# Patient Record
Sex: MALE | Race: WHITE | NOT HISPANIC OR LATINO | Employment: OTHER | URBAN - METROPOLITAN AREA
[De-identification: names, ages, dates, MRNs, and addresses within clinical notes are randomized per-mention and may not be internally consistent; named-entity substitution may affect disease eponyms.]

---

## 2017-03-06 ENCOUNTER — OFFICE VISIT (OUTPATIENT)
Dept: FAMILY MEDICINE | Facility: CLINIC | Age: 72
End: 2017-03-06
Payer: COMMERCIAL

## 2017-03-06 VITALS
HEART RATE: 98 BPM | BODY MASS INDEX: 27.17 KG/M2 | TEMPERATURE: 97.9 F | DIASTOLIC BLOOD PRESSURE: 90 MMHG | HEIGHT: 73 IN | SYSTOLIC BLOOD PRESSURE: 141 MMHG | OXYGEN SATURATION: 100 % | WEIGHT: 205 LBS

## 2017-03-06 DIAGNOSIS — R29.898 KNEE CLICKING: ICD-10-CM

## 2017-03-06 DIAGNOSIS — E87.6 HYPOPOTASSEMIA: ICD-10-CM

## 2017-03-06 DIAGNOSIS — L98.9 FACIAL SKIN LESION: Primary | ICD-10-CM

## 2017-03-06 PROCEDURE — 99214 OFFICE O/P EST MOD 30 MIN: CPT | Performed by: FAMILY MEDICINE

## 2017-03-06 RX ORDER — POTASSIUM CHLORIDE 750 MG/1
10 TABLET, EXTENDED RELEASE ORAL DAILY
Qty: 90 TABLET | Refills: 1 | Status: SHIPPED | OUTPATIENT
Start: 2017-03-06 | End: 2017-05-23 | Stop reason: ALTCHOICE

## 2017-03-06 ASSESSMENT — PAIN SCALES - GENERAL: PAINLEVEL: NO PAIN (0)

## 2017-03-06 NOTE — PROGRESS NOTES
SUBJECTIVE:                                                    Steve Herring is a 71 year old male who presents to clinic today for the following health issues:    Joint Pain     Onset: Oct 2016     Description:   Location: left knee  Character: clicking    Intensity: 0/10    Progression of Symptoms: intermittent    Accompanying Signs & Symptoms:  -Soreness/stiffness after sitting for a period of time     History:   Previous similar pain: no       Precipitating factors:   Trauma or overuse: no     Alleviating factors:  Improved by: nothing       Therapies Tried and outcome: IBU      Concern - Spot over lip     When did it start?: about 2-3 months    Any strain/injury, other illness, or event to trigger this problem?   no    Previous history of similar problem:   no      Location:           Over top lip    Describe it:   Pimple-like, kept growing, comes to a head    Severity: mild      Progression of symptoms from onset until now:  improving      Accompanying Signs & Symptoms:  -would bleed when nicked   What have you noticed that tends to make this worse?     None      What have you noticed that tends to make this better?     None      What have you done (this time) to try to treat this problem yourself?     None      Did it help?     no           Past medical, family, and social histories, medications, and allergies are reviewed and updated in Epic.     ROS:  Constitutional, HEENT, cardiovascular, pulmonary, gi and gu systems are negative, except as otherwise noted.      Any history above obtained by the Medical Assistant was reviewed by Dr. Mango Gilliland MD, and edited when necessary.    This document serves as a record of the services and decisions personally performed and made by Dr. Gilliland. It was created on his behalf by Kelsi Vega, a trained medical scribe. The creation of this document is based on the provider's statements to the medical scribe.  Kelsi Vega March 6, 2017 8:56 AM   OBJECTIVE:     "                                                /90  Pulse 98  Temp 97.9  F (36.6  C) (Oral)  Ht 6' 1\" (1.854 m)  Wt 205 lb (93 kg)  SpO2 100%  BMI 27.05 kg/m2  Body mass index is 27.05 kg/(m^2).  GENERAL: healthy, alert and no distress  EYES: Eyes grossly normal to inspection, PERRL and conjunctivae and sclerae normal  MS: no gross musculoskeletal defects noted, no edema  SKIN: 2-3 mm lesion of inflamed skin above the lip, near the right border of the philtrum.  NEURO: Normal strength and tone, mentation intact and speech normal, cranial nerves 2-12 intact   PSYCH: mentation appears normal, affect normal/bright      ASSESSMENT/PLAN:                                                    (L98.9) Facial skin lesion  (primary encounter diagnosis)  Comment: Hx suggests that this is a small BCC, that has been traumatized frequently by the patient and by shaving.   Plan: DERMATOLOGY REFERRAL            (R29.898) Knee clicking  Comment: Rule out internal derangement such as a torn cartilage.  Plan: MR Knee Left w/o Contrast, MR Knee Left w/o         Contrast            (E87.6) Hypopotassemia  Comment: The patient does not tolerate the large pill size. He would rather increase his dietary intake of potassium.   Plan: potassium chloride (K-TAB,KLOR-CON) 10 MEQ         tablet        Handouts on potassium rich foods provided. Prescription switched to tablet form but he will not take this until his potassium is rechecked.       The information in this document, created by the medical scribe for me, accurately reflects the services I personally performed and the decisions made by me. I have reviewed and approved this document for accuracy prior to leaving the patient care area.  Mango Gilliland MD    "

## 2017-03-06 NOTE — NURSING NOTE
"Chief Complaint   Patient presents with     Other     Bump over lip     Knee Pain     Left       Initial /85 (BP Location: Left arm, Patient Position: Chair, Cuff Size: Adult Regular)  Pulse 98  Temp 97.9  F (36.6  C) (Oral)  Ht 6' 1\" (1.854 m)  Wt 205 lb (93 kg)  SpO2 100%  BMI 27.05 kg/m2 Estimated body mass index is 27.05 kg/(m^2) as calculated from the following:    Height as of this encounter: 6' 1\" (1.854 m).    Weight as of this encounter: 205 lb (93 kg).  Medication Reconciliation: complete   LUCY Ramon MA      "

## 2017-03-06 NOTE — MR AVS SNAPSHOT
After Visit Summary   3/6/2017    Steve Herring    MRN: [id number]           Patient Information     Date Of Birth          1945        Visit Information        Provider Department      3/6/2017 8:40 AM Mango Gilliland MD [facility]        Today's Diagnoses     Facial skin lesion    -  1    Knee clicking        Hypopotassemia          Care Instructions        ================================================================================  Normal Values   Blood pressure  <140/90 for most adults    <130/80 for some chronic diseases (ask your care team about yours)    BMI (body mass index)  18.5-25 kg/m2 (based on height and weight)     Thank you for visiting [facility]    Normal or non-critical lab and imaging results will be communicated to you by MyChart, letter or phone within 7 days.  If you do not hear from us within 10 days, please call the clinic. If you have a critical or abnormal lab result, we will notify you by phone as soon as possible.     If you have any questions regarding your visit please contact:     Team Comfort:   Clinic Hours Telephone Number   Dr. Mango Miranda 7am-5pm  Monday - Friday [phone]  Christian Hampton RN   Pharmacy 8:00am-8pm Monday-Friday    9am-5pm Saturday- [phone]   Urgent Care 11am-9pm Monday-Friday        9am-5pm Saturday- [phone]     After hours, weekend or if you need to make an appointment with your primary provider please call [phone].   After Hours nurse advise: call [facility]: [phone]    Medication Refills:  Call your pharmacy and they will forward the refill to us. Please allow 3 business days for your refills to be completed.        Please call [facility]/Imagin2/[phone] to schedule your left knee MRI.    Please call the [facility]  "[place]  [phone] to schedule your left knee MRI.    Potassium-Rich Foods  The normal adult diet usually contains 2,000 mcg to 4,000 mcg (50 to 100 mEq) of potassium per day. More potassium is needed when there is excess loss of potassium from the body. Diuretic medicines (\"water pills\"), diarrhea, and vomiting can cause this. To increase the amount of potassium in your diet, include these high potassium foods.    [The (*) indicates foods highest in potassium.]  Vegetables  Artichokes - cooked 1/2 cup, 200 mg to 300 mg*  Asparagus - cooked 1/2 cup, 200 mg to 300 mg  Beans, white, red, coombs cooked 1/2 cup, 300 mg to 500 mg*  Beets - cooked 1/2 cup, 200 mg to 300 mg  Broccoli - cooked or raw 1 cup, 200 mg to 500 mg*  [place] sprouts - cooked 1/2 cup, 200 mg to 300 mg  Cabbage - raw 1 cup, 100 mg to 200 mg  Carrots - raw or cooked 1/2 cup, 100 mg to 200 mg  Celery - raw 1 cup, 300 mg to 400 mg   [place] Beans - fresh or frozen 1/2 cup, 300 mg to 500 mg*   Mushrooms - raw or cooked 1/2 cup, 100 mg to 300 mg  Peas - cooked 1/2 cup, 200 mg to 300 mg   Potatoes - baked 1 medium, 500 mg to 900 mg*   Spinach - raw 2 cups, 300 mg to 400 mg *  Spinach - cooked 1 cups, 800 mg to 900 mg*   Squash, winter - fresh, frozen, or cooked 1/2 cup, 200 mg to 400 mg   Tomato - fresh 1 medium, 200 mg to 300 mg   Tomato juice - canned 1/2 cup, 200 mg to 300 mg   Fruits  Apple juice - unsweetened 1 cup, 200 mg to 300 mg   Apricots - canned 1/2 cup, 200 mg to 300 mg   Apricots - dried 4 pieces, 100 mg to 200 mg   Avocado - raw 1/2 cup, 300 mg to 400 mg*  Banana - fresh 1 small, 300 mg to 400 mg*   Blackberries - fresh or frozen 1 cup, 200 mg to 300 mg   Cantaloupe - fresh 1 cup diced, 300 mg to 400 mg*   Grape juice - unsweetened 1 cup, 200 mg to 300 mg   Honeydew melon - fresh 1 cup diced, 300 mg to 400 mg*   Orange juice - unsweetened, fresh or frozen 1/2 cup, 200 mg to 300 mg  Orange - fresh 1 medium, 200 mg to 300 mg    Pineapple juice " - unsweetened 1 cup, 300 mg to 400 mg   Prune juice - unsweetened 1/2 cup, 300 mg to 400 mg*   Prunes - dried 5 pieces, 300 mg to 400 mg*   Strawberries - fresh or frozen 1 cup, 200 mg to 300 mg  Meat  Red Meat - cooked 3 oz, 100 mg to 300 mg   Shrimp - cooked 3/4 cup, 100 mg to 200 mg   Tuna - fresh or canned 3/4 cup, 200 mg to 500 mg   Cod, flounder, halibut - cooked 3 oz, 100 mg to 300 mg*  Partlow - cooked 3 oz, 300 mg to 400 mg*   Scallops - cooked 3 oz, 200 mg to 300 mg*    0414-4625 [employer]. [address] [place] [address], [place] [address]. All rights reserved. This information is not intended as a substitute for professional medical care. Always follow your healthcare professional's instructions.        Discharge Instructions: Eating a High-Potassium Diet  Your healthcare provider has told you to eat a high-potassium diet. This may be because you have low levels of potassium in your blood. Or it may be because you have high blood pressure. Potassium is found in many foods. These include dairy products, nuts, seeds, and beans. It s also found in many fruits and vegetables in high amounts.  Guidelines for a high-potassium diet    Eat fruits and vegetables in their fresh or raw form most often.    Check labels for ingredients that have potassium. This includes potassium chloride. Add these items to your diet.    Try salt substitutes. Many of these have potassium.    Avoid licorice. This includes licorice root and teas that have licorice. These can ronnell your body of potassium.  Eat plenty of the following high-potassium foods:    Fruits. Good choices are apricots (canned and fresh), bananas, cantaloupe, honeydew melon, kiwi, nectarines, oranges, orange juice, and pears. Dried fruits include apricots, dates, figs, and prunes. Prune juice also has potassium.    Vegetables. Good choices are asparagus, avocado, artichoke, broccoli, bamboo shoots, beets, brussels sprouts, cabbage, celery, chard, okra,  potatoes (white and sweet), pumpkin, rutabaga, spinach (cooked), squash, tomatoes. Also good are tomato sauce, tomato juice, and vegetable juice cocktail.    Chicken, fish, clams, and crab    Milk, chocolate milk, buttermilk, and soy milk    Legumes. These include black-eyed peas, chickpeas, lentils, lima beans, navy beans, red kidney beans, soybeans, and split peas.    Nuts and seeds. Try almonds, Brazil nuts, cashews, peanuts, peanut butter, pecans, pumpkin seeds, sunflower seeds, and walnuts.    Breads and cereals. These include bran and whole-grain products.    Others foods include chocolate, cocoa, coconut milk, and molasses  Follow-up  Make a follow-up appointment for a repeat test. Our staff can help you do this.  When to call your healthcare provider  Call your healthcare provider right away or go to the ER if you have any of the following:    Vomiting    Fatigue    Diarrhea    Rapid, irregular heartbeat    Shortness of breath    Chest pain    Muscle cramps, spasms, or twitching    Weakness    Paralysis     [id number] [employer]. [address] [place] [address]. All rights reserved. This information is not intended as a substitute for professional medical care. Always follow your healthcare professional's instructions.              Follow-ups after your visit        Additional Services     DERMATOLOGY REFERRAL       Your provider has referred you to:   FMG: [facility] [phone]   [email or web address]  UMP: [facility] [phone]   [email or web address]  UMP: [facility] [phone]   [email or web address]  FHN:  [facility] [phone]   [email or web address]  FHN: [facility] [place] [phone]  [email or web address]  FH[facility]:  [facility] [phone]   [email or web address]  N: [facility] [place] [phone]   [email or web address]  FHN: [facility] [place] [phone]   [email or web address]  FHN: [facility] [place] [phone]   [email or web address]    [facility] -   Sirisha Granados [phone]   [email or web address]  Lucero Treutlen [phone]   [email or web address]  [place] (2 locations) [phone]   [email or web address]  [place] [phone]   [email or web address]      Please be aware that coverage of these services is subject to the terms and limitations of your health insurance plan.  Call member services at your health plan with any benefit or coverage questions.      Please bring the following with you to your appointment:    (1) Any X-Rays, CTs or MRIs which have been performed.  Contact the facility where they were done to arrange for  prior to your scheduled appointment.    (2) List of current medications  (3) This referral request   (4) Any documents/labs given to you for this referral                  Future tests that were ordered for you today     Open Future Orders        Priority Expected Expires Ordered    MR Knee Left w/o Contrast Routine  3/6/2018 3/6/2017    MR Knee Left w/o Contrast Routine  3/6/2018 3/6/2017            Who to contact     If you have questions or need follow up information about today's clinic visit or your schedule please contact [facility] directly at [phone].  Normal or non-critical lab and imaging results will be communicated to you by MyChart, letter or phone within 4 business days after the clinic has received the results. If you do not hear from us within 7 days, please contact the clinic through MyChart or phone. If you have a critical or abnormal  "lab result, we will notify you by phone as soon as possible.  Submit refill requests through [email or web address] or call your pharmacy and they will forward the refill request to us. Please allow 3 business days for your refill to be completed.          Additional Information About Your Visit        [employer] Information     [email or web address] gives you secure access to your electronic health record. If you see a primary care provider, you can also send messages to your care team and make appointments. If you have questions, please call your primary care clinic.  If you do not have a primary care provider, please call [phone] and they will assist you.        Care EveryWhere ID     This is your Care EveryWhere ID. This could be used by other organizations to access your [place] medical records  [id number]        Your Vitals Were     Pulse Temperature Height Pulse Oximetry BMI (Body Mass Index)       98 97.9  F (36.6  C) (Oral) 6' 1\" (1.854 m) 100% 27.05 kg/m2        Blood Pressure from Last 3 Encounters:   03/06/17 141/90   11/29/16 138/70   11/15/16 154/58    Weight from Last 3 Encounters:   03/06/17 205 lb (93 kg)   11/15/16 208 lb (94.3 kg)   05/03/16 207 lb 12.8 oz (94.3 kg)              We Performed the Following     DERMATOLOGY REFERRAL          Today's Medication Changes          These changes are accurate as of: 3/6/17  9:26 AM.  If you have any questions, ask your nurse or doctor.               Start taking these medicines.        Dose/Directions    potassium chloride 10 MEQ tablet   Commonly known as:  K-TAB,KLOR-CON   Used for:  Hypopotassemia   Replaces:  potassium chloride 10 MEQ CR capsule   Started by:  Mango Gilliland MD        Dose:  10 mEq   Take 1 tablet (10 mEq) by mouth daily   Quantity:  90 tablet   Refills:  1         Stop taking these medicines if you haven't already. Please contact your care team if you have questions.     potassium chloride 10 MEQ CR capsule   Commonly known as:  MICRO-K   Replaced by:  " potassium chloride 10 MEQ tablet   Stopped by:  Mango Gilliland MD                Where to get your medicines      These medications were sent to [facility] [address] [facility] [place] - [address]  [address] [place] [address]     Phone:  [phone]     potassium chloride 10 MEQ tablet                Primary Care Provider Office Phone # Fax #    Mango Gilliland [phone]       [facility] [address]  [facility] [address]        Thank you!     Thank you for choosing [facility]  for your care. Our goal is always to provide you with excellent care. Hearing back from our patients is one way we can continue to improve our services. Please take a few minutes to complete the written survey that you may receive in the mail after your visit with us. Thank you!             Your Updated Medication List - Protect others around you: Learn how to safely use, store and throw away your medicines at www.disposemymeds.org.          This list is accurate as of: 3/6/17  9:26 AM.  Always use your most recent med list.                   Brand Name Dispense Instructions for use    aspirin 81 MG tablet      1 tablet daily. *.       hydrochlorothiazide 25 MG tablet    HYDRODIURIL    90 tablet    Take 1 tablet (25 mg) by mouth daily for blood pressure       ibuprofen 200 MG tablet    ADVIL/MOTRIN     2-3 tabs every 4 hours (ave 3 times per week)       potassium chloride 10 MEQ tablet    K-TAB,KLOR-CON    90 tablet    Take 1 tablet (10 mEq) by mouth daily       sildenafil 20 MG tablet    REVATIO/VIAGRA    50 tablet    Take 1-5 tablets ( mg) by mouth daily as needed , as directed, 1-3 hours before intimacy. Maximum 1 dose per 24 hours. Never use with nitroglycerin, terazosin or doxazosin.       simvastatin 20 MG tablet    ZOCOR    90 tablet    Take 1 tablet (20 mg) by mouth every evening for cholesterol.

## 2017-03-06 NOTE — PATIENT INSTRUCTIONS
"    ================================================================================  Normal Values   Blood pressure  <140/90 for most adults    <130/80 for some chronic diseases (ask your care team about yours)    BMI (body mass index)  18.5-25 kg/m2 (based on height and weight)     Thank you for visiting [facility]    Normal or non-critical lab and imaging results will be communicated to you by MyChart, letter or phone within 7 days.  If you do not hear from us within 10 days, please call the clinic. If you have a critical or abnormal lab result, we will notify you by phone as soon as possible.     If you have any questions regarding your visit please contact:     Team Comfort:   Clinic Hours Telephone Number   Dr. Mango Miranda 7am-5pm  Monday - Friday [phone]  Christian Hampton RN   Pharmacy 8:00am-8pm Monday-Friday    9am-5pm Saturday- [phone]   Urgent Care 11am-9pm Monday-Friday        9am-5pm Saturday- [phone]     After hours, weekend or if you need to make an appointment with your primary provider please call [phone].   After Hours nurse advise: call [facility]: [phone]    Medication Refills:  Call your pharmacy and they will forward the refill to us. Please allow 3 business days for your refills to be completed.        Please call [facility]/[phone] to schedule your left knee MRI.    Please call the [facility]  [phone] to schedule your left knee MRI.    Potassium-Rich Foods  The normal adult diet usually contains 2,000 mcg to 4,000 mcg (50 to 100 mEq) of potassium per day. More potassium is needed when there is excess loss of potassium from the body. Diuretic medicines (\"water pills\"), diarrhea, and vomiting can cause this. To increase the amount of potassium in your diet, include these high " potassium foods.    [The (*) indicates foods highest in potassium.]  Vegetables  Artichokes - cooked 1/2 cup, 200 mg to 300 mg*  Asparagus - cooked 1/2 cup, 200 mg to 300 mg  Beans, white, red, coombs cooked 1/2 cup, 300 mg to 500 mg*  Beets - cooked 1/2 cup, 200 mg to 300 mg  Broccoli - cooked or raw 1 cup, 200 mg to 500 mg*  Arley sprouts - cooked 1/2 cup, 200 mg to 300 mg  Cabbage - raw 1 cup, 100 mg to 200 mg  Carrots - raw or cooked 1/2 cup, 100 mg to 200 mg  Celery - raw 1 cup, 300 mg to 400 mg   Lima Beans - fresh or frozen 1/2 cup, 300 mg to 500 mg*   Mushrooms - raw or cooked 1/2 cup, 100 mg to 300 mg  Peas - cooked 1/2 cup, 200 mg to 300 mg   Potatoes - baked 1 medium, 500 mg to 900 mg*   Spinach - raw 2 cups, 300 mg to 400 mg *  Spinach - cooked 1 cups, 800 mg to 900 mg*   Squash, winter - fresh, frozen, or cooked 1/2 cup, 200 mg to 400 mg   Tomato - fresh 1 medium, 200 mg to 300 mg   Tomato juice - canned 1/2 cup, 200 mg to 300 mg   Fruits  Apple juice - unsweetened 1 cup, 200 mg to 300 mg   Apricots - canned 1/2 cup, 200 mg to 300 mg   Apricots - dried 4 pieces, 100 mg to 200 mg   Avocado - raw 1/2 cup, 300 mg to 400 mg*  Banana - fresh 1 small, 300 mg to 400 mg*   Blackberries - fresh or frozen 1 cup, 200 mg to 300 mg   Cantaloupe - fresh 1 cup diced, 300 mg to 400 mg*   Grape juice - unsweetened 1 cup, 200 mg to 300 mg   Honeydew melon - fresh 1 cup diced, 300 mg to 400 mg*   Orange juice - unsweetened, fresh or frozen 1/2 cup, 200 mg to 300 mg  Orange - fresh 1 medium, 200 mg to 300 mg    Pineapple juice - unsweetened 1 cup, 300 mg to 400 mg   Prune juice - unsweetened 1/2 cup, 300 mg to 400 mg*   Prunes - dried 5 pieces, 300 mg to 400 mg*   Strawberries - fresh or frozen 1 cup, 200 mg to 300 mg  Meat  Red Meat - cooked 3 oz, 100 mg to 300 mg   Shrimp - cooked 3/4 cup, 100 mg to 200 mg   Tuna - fresh or canned 3/4 cup, 200 mg to 500 mg   Cod, flounder, halibut - cooked 3 oz, 100 mg to 300  mg*  [place] - cooked 3 oz, 300 mg to 400 mg*   Scallops - cooked 3 oz, 200 mg to 300 mg*    [id number] [employer]. [address] [place] [address], [place] [address]. All rights reserved. This information is not intended as a substitute for professional medical care. Always follow your healthcare professional's instructions.        Discharge Instructions: Eating a High-Potassium Diet  Your healthcare provider has told you to eat a high-potassium diet. This may be because you have low levels of potassium in your blood. Or it may be because you have high blood pressure. Potassium is found in many foods. These include dairy products, nuts, seeds, and beans. It s also found in many fruits and vegetables in high amounts.  Guidelines for a high-potassium diet    Eat fruits and vegetables in their fresh or raw form most often.    Check labels for ingredients that have potassium. This includes potassium chloride. Add these items to your diet.    Try salt substitutes. Many of these have potassium.    Avoid licorice. This includes licorice root and teas that have licorice. These can ronnell your body of potassium.  Eat plenty of the following high-potassium foods:    Fruits. Good choices are apricots (canned and fresh), bananas, cantaloupe, honeydew melon, kiwi, nectarines, oranges, orange juice, and pears. Dried fruits include apricots, dates, figs, and prunes. Prune juice also has potassium.    Vegetables. Good choices are asparagus, avocado, artichoke, broccoli, bamboo shoots, beets, brussels sprouts, cabbage, celery, chard, okra, potatoes (white and sweet), pumpkin, rutabaga, spinach (cooked), squash, tomatoes. Also good are tomato sauce, tomato juice, and vegetable juice cocktail.    Chicken, fish, clams, and crab    Milk, chocolate milk, buttermilk, and soy milk    Legumes. These include black-eyed peas, chickpeas, lentils, lima beans, navy beans, red kidney beans, soybeans, and split peas.    Nuts and seeds. Try  almonds, Brazil nuts, cashews, peanuts, peanut butter, pecans, pumpkin seeds, sunflower seeds, and walnuts.    Breads and cereals. These include bran and whole-grain products.    Others foods include chocolate, cocoa, coconut milk, and molasses  Follow-up  Make a follow-up appointment for a repeat test. Our staff can help you do this.  When to call your healthcare provider  Call your healthcare provider right away or go to the ER if you have any of the following:    Vomiting    Fatigue    Diarrhea    Rapid, irregular heartbeat    Shortness of breath    Chest pain    Muscle cramps, spasms, or twitching    Weakness    Paralysis     [id number] [employer]. [address] [place] [address], [place] [address]. All rights reserved. This information is not intended as a substitute for professional medical care. Always follow your healthcare professional's instructions.

## 2017-04-03 ENCOUNTER — RADIANT APPOINTMENT (OUTPATIENT)
Dept: MRI IMAGING | Facility: CLINIC | Age: 72
End: 2017-04-03
Attending: FAMILY MEDICINE
Payer: COMMERCIAL

## 2017-04-03 DIAGNOSIS — R29.898 KNEE CLICKING: Primary | ICD-10-CM

## 2017-04-03 DIAGNOSIS — M23.301 DEGENERATIVE TEAR OF LATERAL MENISCUS OF LEFT KNEE: ICD-10-CM

## 2017-04-03 DIAGNOSIS — M23.204 DEGENERATIVE TEAR OF MEDIAL MENISCUS OF LEFT KNEE: ICD-10-CM

## 2017-04-03 PROCEDURE — 73721 MRI JNT OF LWR EXTRE W/O DYE: CPT | Mod: TC

## 2017-04-05 ENCOUNTER — TELEPHONE (OUTPATIENT)
Dept: DERMATOLOGY | Facility: CLINIC | Age: 72
End: 2017-04-05

## 2017-04-14 ENCOUNTER — OFFICE VISIT (OUTPATIENT)
Dept: DERMATOLOGY | Facility: CLINIC | Age: 72
End: 2017-04-14
Payer: COMMERCIAL

## 2017-04-14 DIAGNOSIS — Z80.8 FAMILY HISTORY OF MELANOMA: ICD-10-CM

## 2017-04-14 DIAGNOSIS — Z12.83 SKIN CANCER SCREENING: ICD-10-CM

## 2017-04-14 DIAGNOSIS — L82.1 SK (SEBORRHEIC KERATOSIS): ICD-10-CM

## 2017-04-14 DIAGNOSIS — L81.4 SOLAR LENTIGINOSIS: ICD-10-CM

## 2017-04-14 DIAGNOSIS — L57.0 AK (ACTINIC KERATOSIS): Primary | ICD-10-CM

## 2017-04-14 DIAGNOSIS — D18.01 CHERRY ANGIOMA: ICD-10-CM

## 2017-04-14 PROCEDURE — 17003 DESTRUCT PREMALG LES 2-14: CPT | Performed by: DERMATOLOGY

## 2017-04-14 PROCEDURE — 99203 OFFICE O/P NEW LOW 30 MIN: CPT | Mod: 25 | Performed by: DERMATOLOGY

## 2017-04-14 PROCEDURE — 17000 DESTRUCT PREMALG LESION: CPT | Performed by: DERMATOLOGY

## 2017-04-14 NOTE — MR AVS SNAPSHOT
After Visit Summary   4/14/2017    Steve Herring    MRN: [id number]           Patient Information     Date Of Birth          1945        Visit Information        Provider Department      4/14/2017 9:30 AM Papo Blackburn MD [facility]        Today's Diagnoses     AK (actinic keratosis)    -  1    Family history of melanoma        SK (seborrheic keratosis)        Cherry angioma        Solar lentiginosis          Care Instructions    Cryotherapy    What is it?    Use of a very cold liquid, such as liquid nitrogen, to freeze and destroy abnormal skin cells that need to be removed    What should I expect?    Tenderness and redness    A small blister that might grow and fill with dark purple blood. There may be crusting.    More than one treatment may be needed if the lesions do not go away.    How do I care for the treated area?    Gently wash the area with your hands when bathing.    Use a thin layer of Vaseline to help with healing. You may use a Band-Aid.     The area should heal within 7-10 days and may leave behind a pink or lighter color.     Do not use an antibiotic or Neosporin ointment.     You may take acetaminophen (Tylenol) for pain.     Call your Doctor if you have:    Severe pain    Signs of infection (warmth, redness, cloudy yellow drainage, and or a bad smell)    Questions or concerns    Who should I call with questions?       [facility]: [phone]       [facility]: [phone]       For urgent needs outside of business hours call the [facility] at [phone]        and ask for the dermatology resident on call          Follow-ups after your visit        Who to contact     If you have questions or need follow up information about today's clinic visit or your schedule please contact [facility] directly at [phone].  Normal or non-critical lab and imaging results will  be communicated to you by MyChart, letter or phone within 4 business days after the clinic has received the results. If you do not hear from us within 7 days, please contact the clinic through [employer] or phone. If you have a critical or abnormal lab result, we will notify you by phone as soon as possible.  Submit refill requests through [employer] or call your pharmacy and they will forward the refill request to us. Please allow 3 business days for your refill to be completed.          Additional Information About Your Visit        [employer] Information     [employer] gives you secure access to your electronic health record. If you see a primary care provider, you can also send messages to your care team and make appointments. If you have questions, please call your primary care clinic.  If you do not have a primary care provider, please call [phone] and they will assist you.      [employer] is an electronic gateway that provides easy, online access to your medical records. With [employer], you can request a clinic appointment, read your test results, renew a prescription or communicate with your care team.     To access your existing account, please contact your [facility] or call [phone] for assistance.        Care EveryWhere ID     This is your Care EveryWhere ID. This could be used by other organizations to access your [facility] medical records  [id number]         Blood Pressure from Last 3 Encounters:   03/06/17 141/90   11/29/16 138/70   11/15/16 154/58    Weight from Last 3 Encounters:   03/06/17 93 kg (205 lb)   11/15/16 94.3 kg (208 lb)   05/03/16 94.3 kg (207 lb 12.8 oz)              We Performed the Following     DESTRUCT PREMALIGNANT LESION, 2-14     DESTRUCT PREMALIGNANT LESION, FIRST        Primary Care Provider Office Phone # Fax #    Mango Gilliland [phone]       [facility] [address]  [facility] [address]        Thank you!      Thank you for choosing [facility]  for your care. Our goal is always to provide you with excellent care. Hearing back from our patients is one way we can continue to improve our services. Please take a few minutes to complete the written survey that you may receive in the mail after your visit with us. Thank you!             Your Updated Medication List - Protect others around you: Learn how to safely use, store and throw away your medicines at www.disposemymeds.org.          This list is accurate as of: 4/14/17  9:42 AM.  Always use your most recent med list.                   Brand Name Dispense Instructions for use    aspirin 81 MG tablet      1 tablet daily. *.       hydrochlorothiazide 25 MG tablet    HYDRODIURIL    90 tablet    Take 1 tablet (25 mg) by mouth daily for blood pressure       ibuprofen 200 MG tablet    ADVIL/MOTRIN     2-3 tabs every 4 hours (ave 3 times per week)       potassium chloride 10 MEQ tablet    K-TAB,KLOR-CON    90 tablet    Take 1 tablet (10 mEq) by mouth daily       sildenafil 20 MG tablet    REVATIO/VIAGRA    50 tablet    Take 1-5 tablets ( mg) by mouth daily as needed , as directed, 1-3 hours before intimacy. Maximum 1 dose per 24 hours. Never use with nitroglycerin, terazosin or doxazosin.       simvastatin 20 MG tablet    ZOCOR    90 tablet    Take 1 tablet (20 mg) by mouth every evening for cholesterol.

## 2017-04-14 NOTE — NURSING NOTE
Dermatology Rooming Note    Steve Herring's goals for this visit include:   Chief Complaint   Patient presents with     Derm Problem     facial lesion, skin check       Is a scribe okay for this visit:YES    Are records needed for this visit(If yes, obtain release of information): No     Vitals: There were no vitals taken for this visit.    Referring Provider:  ESTABLISHED PATIENT  No address on file      Kena Alcazar LPN

## 2017-04-14 NOTE — PATIENT INSTRUCTIONS
Cryotherapy    What is it?    Use of a very cold liquid, such as liquid nitrogen, to freeze and destroy abnormal skin cells that need to be removed    What should I expect?    Tenderness and redness    A small blister that might grow and fill with dark purple blood. There may be crusting.    More than one treatment may be needed if the lesions do not go away.    How do I care for the treated area?    Gently wash the area with your hands when bathing.    Use a thin layer of Vaseline to help with healing. You may use a Band-Aid.     The area should heal within 7-10 days and may leave behind a pink or lighter color.     Do not use an antibiotic or Neosporin ointment.     You may take acetaminophen (Tylenol) for pain.     Call your Doctor if you have:    Severe pain    Signs of infection (warmth, redness, cloudy yellow drainage, and or a bad smell)    Questions or concerns    Who should I call with questions?       [facility]: [phone]       [facility]: [phone]       For urgent needs outside of business hours call the [facility] at [phone]        and ask for the dermatology resident on call

## 2017-04-14 NOTE — PROGRESS NOTES
[facility] Dermatology Note      Dermatology Problem List:  1. Family history of melanoma    Last FBSE: 4/14/17    Encounter Date: Apr 14, 2017    CC:  Chief Complaint   Patient presents with     Derm Problem     facial lesion, skin check         History of Present Illness:  Mr. Steve Herring is a 71 year old male who presents as a referral from Dr. Gilliland for lesion on the upper lip. Today, the patient reports the lesion on his lip has been present for a few months and it is bothersome because he catches it while shaving. The lesion does not bleed on its own. Has additional lesions on the forearms and hands that are darker than other lesions. Has a pruritic lesion on the mid back that he occasionally peels off. Patient has seen a dermatologist in the past. The patient reports no other lesions of concern.    Past Medical History:   Patient Active Problem List   Diagnosis     Hyperlipidemia LDL goal <130     History of adenomatous polyp of colon     Erectile dysfunction     Essential hypertension with goal blood pressure less than 140/90     Advance care planning     Degenerative arthritis of right knee     Degenerative tear of lateral meniscus of right knee     Degenerative tear of medial meniscus of right knee     Dupuytren's contracture of right hand     Hypopotassemia     Past Medical History:   Diagnosis Date     Arthritis 2010     Colon polyps 2003    colonoscopy due Jan 2012     Dupuytren's contracture ~2000    RT>LT     Erectile dysfunction      Essential hypertension, benign 2003     Hyperlipidemia LDL goal <130      MMT (medial meniscus tear) 2004    RT     Past Surgical History:   Procedure Laterality Date     CATARACT IOL, RT/LT  12/18/08    RT     CATARACT IOL, RT/LT  11/13/13    LT, Dr. Carvajal     COLONOSCOPY       HC PNEUMATIC RETINOPEXY  3/17/05    Dr. Steve Hebert     HC REMOVE TONSILS/ADENOIDS,12+ Y/O         Social History:  The patient works as a . The patient  admits to use of tanning beds once in the past. The patient does not drink alcohol, smoke or use tobacco.    Family History:  There is a family history of melanoma in the patient's son.    Medications:  Current Outpatient Prescriptions   Medication Sig Dispense Refill     potassium chloride (K-TAB,KLOR-CON) 10 MEQ tablet Take 1 tablet (10 mEq) by mouth daily 90 tablet 1     sildenafil (REVATIO/VIAGRA) 20 MG tablet Take 1-5 tablets ( mg) by mouth daily as needed , as directed, 1-3 hours before intimacy. Maximum 1 dose per 24 hours. Never use with nitroglycerin, terazosin or doxazosin. 50 tablet 1     hydrochlorothiazide (HYDRODIURIL) 25 MG tablet Take 1 tablet (25 mg) by mouth daily for blood pressure 90 tablet 1     simvastatin (ZOCOR) 20 MG tablet Take 1 tablet (20 mg) by mouth every evening for cholesterol. 90 tablet 1     IBUPROFEN 200 MG PO TABS 2-3 tabs every 4 hours (ave 3 times per week)       ASPIRIN 81 MG PO TABS 1 tablet daily. *.        Allergies   Allergen Reactions     Amoxicillin Hives     Also angioedema     Review of Systems:  -Skin/Heme New Pt: The patient admits to frequent sun exposure. The patient denies excessive scarring or problems healing except as per HPI. The patient denies excessive bleeding.  -Const: no fevers or chills.    Physical exam:  Vitals: There were no vitals taken for this visit.  GEN: This is a well-nourished, well developed male in no acute distress  NEURO: Alert and oriented  PSYCH: in a pleasant mood, appropriate affect  SKIN: Full body skin exam performed. The exam included the head/face, neck, both arms, chest, back, abdomen, both legs, digits and/or nails, groin, and buttock.   -Mild eczematous pink appearing papules on the right antecubital fossa.   -There are erythematous macules with overyling adherent scale on the right mid upper cutaneous lip, right temple and left zygoma.   -There are waxy stuck on tan to brown papules on the trunk and extremities.  -There are  bright red dome shaped papules scattered on the chest.   -2mm papule with slight central scale on the right mid upper cutaneous lip.   -No other lesions of concern on areas examined.     Impression/Plan:  1. Family history of melanoma in son. No concerns with skin check today.    Discussed increased risk of skin cancer and importance of regular skin exams.   2. Actinic keratosis, right mid upper cutaneous lip, right temple, left zygoma    Cryotherapy procedure note: After verbal consent and discussion of risks and benefits including but no limited to dyspigmentation/scar, blister, and pain, 3 were treated with 1-2mm freeze border for 1 cycle with liquid nitrogen. Post cryotherapy instructions were provided.  3. Eczema, mild, right antecubital fossa    Recommend moisturizer  4. Seborrheic keratosis, trunk and extremities including the mid back    Discussed benign nature, no further intervention required at this time.   5. Cherry angiomas, chest    No further intervention required at this time.     CC Dr. Bennie Gilliland on close of this encounter.  Follow-up in 1 year, earlier for new or changing lesions.       Staff Involved:  Scribe/Staff    Scribe Disclosure:   I, Zehra Hanson, am serving as a scribe to document services personally performed by Dr. Papo Blackburn, based on data collection and the provider's statements to me.     Provider Disclosure:   I have reviewed the documentation recorded by the scribe and have edited it as needed. I have personally performed the services documented here and the documentation accurately represents those services and the decisions made by me.     Papo Blackburn MD, MS    Department of Dermatology  [facility]: Phone: [phone], Fax:[phone]  [facility]: Phone: [phone], Fax: [phone]

## 2017-05-02 ENCOUNTER — TELEPHONE (OUTPATIENT)
Dept: FAMILY MEDICINE | Facility: CLINIC | Age: 72
End: 2017-05-02

## 2017-05-02 NOTE — TELEPHONE ENCOUNTER
Panel Management Review      BP Readings from Last 1 Encounters:   03/06/17 141/90        Fail List measure: Hypertension      Patient is due/failing the following:   BP CHECK    Action needed:   Ancillary blood pressure check     Type of outreach:    Sent [employer] message.    Questions for provider review:    None                                                                                                                                    Toma Garner      Chart routed to n/a .

## 2017-05-10 DIAGNOSIS — E78.5 HYPERLIPIDEMIA LDL GOAL <130: ICD-10-CM

## 2017-05-10 DIAGNOSIS — I10 ESSENTIAL HYPERTENSION WITH GOAL BLOOD PRESSURE LESS THAN 140/90: ICD-10-CM

## 2017-05-10 NOTE — TELEPHONE ENCOUNTER
simvastatin (ZOCOR) 20 MG tablet     Last Written Prescription Date: 11/15/16  Last Fill Quantity: 90, # refills: 1  [facility] [place] with [facility] or [facility] prescribing provider: 03/06/17       Lab Results   Component Value Date    CHOL 120 11/15/2016     Lab Results   Component Value Date    HDL 38 11/15/2016     Lab Results   Component Value Date    LDL 53 11/15/2016     Lab Results   Component Value Date    TRIG 145 11/15/2016     Lab Results   Component Value Date    CHOLHDLRATIO 3.3 10/27/2015       hydrochlorothiazide (HYDRODIURIL) 25 MG tablet      Last Written Prescription Date: 11/15/16  Last Fill Quantity: 90, # refills: 1  Last Office Visit with [facility] or [facility] prescribing provider: 03/06/17       Potassium   Date Value Ref Range Status   11/15/2016 3.3 (L) 3.4 - 5.3 mmol/L Final     Creatinine   Date Value Ref Range Status   11/15/2016 1.02 0.66 - 1.25 mg/dL Final     BP Readings from Last 3 Encounters:   03/06/17 141/90   11/29/16 138/70   11/15/16 154/58         Le Holloway  [facility]

## 2017-05-15 NOTE — TELEPHONE ENCOUNTER
Routing refill request to provider for review/approval because:  Potassium low with reference to recheck but does not indicate when.   Blood pressure outside of goal range.   Please specify follow up and address refill.  Michelle Acosta RN

## 2017-05-16 RX ORDER — HYDROCHLOROTHIAZIDE 25 MG/1
TABLET ORAL
Qty: 90 TABLET | Refills: 0 | Status: SHIPPED | OUTPATIENT
Start: 2017-05-16 | End: 2017-05-23 | Stop reason: ALTCHOICE

## 2017-05-16 RX ORDER — SIMVASTATIN 20 MG
TABLET ORAL
Qty: 90 TABLET | Refills: 0 | Status: SHIPPED | OUTPATIENT
Start: 2017-05-16 | End: 2017-05-23

## 2017-05-16 NOTE — TELEPHONE ENCOUNTER
He is due now for recheck of cholesterol (it has been 6 mo), blood pressure (too high last visit) and potassium level (too low last check). Please assist in scheduling

## 2017-05-16 NOTE — TELEPHONE ENCOUNTER
This writer attempted to contact Steve on 05/16/17    Reason for call schedule appointment and left message to return call.    When patient calls back, please schedule appointment in 2 weeks with PCP .        Mac Ramon MA

## 2017-05-23 ENCOUNTER — OFFICE VISIT (OUTPATIENT)
Dept: FAMILY MEDICINE | Facility: CLINIC | Age: 72
End: 2017-05-23
Payer: COMMERCIAL

## 2017-05-23 VITALS
TEMPERATURE: 97.6 F | HEIGHT: 73 IN | HEART RATE: 54 BPM | BODY MASS INDEX: 27.17 KG/M2 | SYSTOLIC BLOOD PRESSURE: 138 MMHG | OXYGEN SATURATION: 96 % | DIASTOLIC BLOOD PRESSURE: 78 MMHG | WEIGHT: 205 LBS

## 2017-05-23 DIAGNOSIS — Z86.0101 HISTORY OF ADENOMATOUS POLYP OF COLON: ICD-10-CM

## 2017-05-23 DIAGNOSIS — I10 ESSENTIAL HYPERTENSION WITH GOAL BLOOD PRESSURE LESS THAN 140/90: ICD-10-CM

## 2017-05-23 DIAGNOSIS — E78.5 HYPERLIPIDEMIA LDL GOAL <130: ICD-10-CM

## 2017-05-23 DIAGNOSIS — E87.6 HYPOPOTASSEMIA: ICD-10-CM

## 2017-05-23 DIAGNOSIS — Z00.00 ENCOUNTER FOR ROUTINE ADULT HEALTH EXAMINATION WITHOUT ABNORMAL FINDINGS: Primary | ICD-10-CM

## 2017-05-23 LAB
ALT SERPL W P-5'-P-CCNC: 25 U/L (ref 0–70)
ANION GAP SERPL CALCULATED.3IONS-SCNC: 8 MMOL/L (ref 3–14)
BUN SERPL-MCNC: 24 MG/DL (ref 7–30)
CALCIUM SERPL-MCNC: 9 MG/DL (ref 8.5–10.1)
CHLORIDE SERPL-SCNC: 104 MMOL/L (ref 94–109)
CHOLEST SERPL-MCNC: 131 MG/DL
CO2 SERPL-SCNC: 30 MMOL/L (ref 20–32)
CREAT SERPL-MCNC: 1.04 MG/DL (ref 0.66–1.25)
GFR SERPL CREATININE-BSD FRML MDRD: 71 ML/MIN/1.7M2
GLUCOSE SERPL-MCNC: 102 MG/DL (ref 70–99)
HDLC SERPL-MCNC: 45 MG/DL
LDLC SERPL CALC-MCNC: 64 MG/DL
NONHDLC SERPL-MCNC: 86 MG/DL
POTASSIUM SERPL-SCNC: 3.5 MMOL/L (ref 3.4–5.3)
SODIUM SERPL-SCNC: 142 MMOL/L (ref 133–144)
TRIGL SERPL-MCNC: 109 MG/DL

## 2017-05-23 PROCEDURE — 80048 BASIC METABOLIC PNL TOTAL CA: CPT | Performed by: FAMILY MEDICINE

## 2017-05-23 PROCEDURE — 99397 PER PM REEVAL EST PAT 65+ YR: CPT | Performed by: FAMILY MEDICINE

## 2017-05-23 PROCEDURE — 80061 LIPID PANEL: CPT | Performed by: FAMILY MEDICINE

## 2017-05-23 PROCEDURE — 84460 ALANINE AMINO (ALT) (SGPT): CPT | Performed by: FAMILY MEDICINE

## 2017-05-23 PROCEDURE — 36415 COLL VENOUS BLD VENIPUNCTURE: CPT | Performed by: FAMILY MEDICINE

## 2017-05-23 RX ORDER — SIMVASTATIN 20 MG
20 TABLET ORAL EVERY EVENING
Qty: 90 TABLET | Refills: 0 | Status: SHIPPED | OUTPATIENT
Start: 2017-05-23 | End: 2017-10-18

## 2017-05-23 RX ORDER — LISINOPRIL 20 MG/1
20 TABLET ORAL DAILY
Qty: 90 TABLET | Refills: 1 | Status: SHIPPED | OUTPATIENT
Start: 2017-05-23 | End: 2017-12-05

## 2017-05-23 NOTE — MR AVS SNAPSHOT
After Visit Summary   5/23/2017    Steve Herring    MRN: [id number]           Patient Information     Date Of Birth          1945        Visit Information        Provider Department      5/23/2017 7:20 AM Mango Gilliland MD [facility]        Today's Diagnoses     Encounter for routine adult health examination without abnormal findings    -  1    Hyperlipidemia LDL goal <130        Essential hypertension with goal blood pressure less than 140/90        Hypopotassemia        History of adenomatous polyp of colon          Care Instructions      Preventive Health Recommendations:   Male Ages 65 and over    Yearly exam:             See your health care provider every year in order to  o   Review health changes.   o   Discuss preventive care.    o   Review your medicines if your doctor has prescribed any.    Talk with your health care provider about whether you should have a test to screen for prostate cancer (PSA).    Every 3 years, have a diabetes test (fasting glucose). If you are at risk for diabetes, you should have this test more often.    Every 5 years, have a cholesterol test. Have this test more often if you are at risk for high cholesterol or heart disease.     Every 10 years, have a colonoscopy. Or, have a yearly FIT test (stool test). These exams will check for colon cancer.    Talk to with your health care provider about screening for Abdominal Aortic Aneurysm if you have a family history of AAA or have a history of smoking.    Shots:     Get a flu shot each year.     Get a tetanus shot every 10 years.     Talk to your doctor about your pneumonia vaccines. There are now two you should receive - Pneumovax (PPSV 23) and Prevnar (PCV 13).     Talk to your doctor about a shingles vaccine.     Talk to your doctor about the hepatitis B vaccine.  Nutrition:     Eat at least 5 servings of fruits and vegetables each day.     Eat whole-grain bread, whole-wheat pasta and brown  rice instead of white grains and rice.     Talk to your provider about Calcium and Vitamin D.   Lifestyle    Exercise for at least 150 minutes a week (30 minutes a day, 5 days a week). This will help you control your weight and prevent disease.     Limit alcohol to one drink per day.     No smoking.     Wear sunscreen to prevent skin cancer.     See your dentist every six months for an exam and cleaning.     See your eye doctor every 1 to 2 years to screen for conditions such as glaucoma, macular degeneration, cataracts, etc   Preventive Health Recommendations:   Male Ages 65 and over    Yearly exam:             See your health care provider every year in order to  o   Review health changes.   o   Discuss preventive care.    o   Review your medicines if your doctor has prescribed any.  Talk with your health care provider about whether you should have a test to screen for prostate cancer (PSA).  Every 3 years, have a diabetes test (fasting glucose). If you are at risk for diabetes, you should have this test more often.  Every 5 years, have a cholesterol test. Have this test more often if you are at risk for high cholesterol or heart disease.   Every 10 years, have a colonoscopy. Or, have a yearly FIT test (stool test). These exams will check for colon cancer.  Talk to with your health care provider about screening for Abdominal Aortic Aneurysm if you have a family history of AAA or have a history of smoking.    Shots:   Get a flu shot each year.   Get a tetanus shot every 10 years.   Talk to your doctor about your pneumonia vaccines. There are now two you should receive - Pneumovax (PPSV 23) and Prevnar (PCV 13).   Talk to your doctor about a shingles vaccine.   Talk to your doctor about the hepatitis B vaccine.  Nutrition:   Eat at least 5 servings of fruits and vegetables each day.   Eat whole-grain bread, whole-wheat pasta and brown rice instead of white grains and rice.   Talk to your provider about Calcium and  Vitamin D.   Lifestyle  Exercise for at least 150 minutes a week (30 minutes a day, 5 days a week). This will help you control your weight and prevent disease.   Limit alcohol to one drink per day.   No smoking.   Wear sunscreen to prevent skin cancer.   See your dentist every six months for an exam and cleaning.   See your eye doctor every 1 to 2 years to screen for conditions such as glaucoma, macular degeneration, cataracts, etc     This summary includes the important diagnoses, test, medications and other important parts of your medical history.  Below are a few good we sites you can use to learn more about these.     Www.Wonderswamp.RewardsPay : Up to date and easily searchable information on multiple topics.  Www.Wonderswamp.RewardsPay/[email or web address]/c_539084.asp : [facility] $4.99 medications  Www.Quixhop.gov : medication info, interactive tutorials, watch real surgeries online  Www.familydoctor.org : good info from the Academy of Family Physicians  Www.Refinder by [email or web address].VenJuvo : good info from the [facility]  Www.cdc.gov : public health info, travel advisories, epidemics (H1N1)  Www.aap.org : children's health info, normal development, vaccinations  Www.health.Community Health.[place].us : [place] dept of heatlh, public health issues in [place], N1N1    Based on your medical history and these are the current health maintenance or preventive care services that you are due for (some may have been done at this visit:)  Health Maintenance Due   Topic Date Due     COLONOSCOPY Q5 YR INNILSAET MESSAGE  04/12/2017     =================================================================================  Normal Values   Blood pressure  <140/90 for most adults    <130/80 for some chronic diseases (ask your care team about yours)    BMI (body mass index)  18.5-25 kg/m2 (based on height and weight)     Thank you for visiting [facility]    Normal or non-critical lab and imaging results will be communicated to you by MyChart, letter or phone within 7  days.  If you do not hear from us within 10 days, please call the clinic. If you have a critical or abnormal lab result, we will notify you by phone as soon as possible.     If you have any questions regarding your visit please contact:     Team Comfort:   Clinic Hours Telephone Number   Dr. Mango Lozano  Eileen Mcfaddenev   7am-5pm  Monday - Friday [phone]  Jayesh SOLIMAN   Pharmacy 8am-8pm Monday-Thursday      8am-6pm Friday  9am-5pm Saturday-Sunday [phone]   Urgent Care 11am-8pm Monday-Friday        9am-5pm Saturday-Sunday [phone]     After hours, weekend or if you need to make an appointment with your primary provider please call [phone].   After Hours nurse advise: call [facility]: [phone]    Medication Refills:  Call your pharmacy and they will forward the refill to us. Please allow 3 business days for your refills to be completed.    Use [employer] (secure email communication and access to your chart) to send your primary care provider a message or make an appointment. Ask someone on your Team how to sign up for [employer]. To log on to [employer] or for more information in [employer] please visit the website at [email or web address].  As of October 8, 2013, all password changes, disabled accounts, or ID changes in [employer]/MyHealth will be done by our Access Services Department.   If you need help with your account or password, call: [phone]. Clinic staff no longer has the ability to change passwords.           Follow-ups after your visit        Follow-up notes from your care team     Return in about 2 months (around 7/23/2017) for blood pressure check.      Who to contact     If you have questions or need follow up information about today's clinic visit or your schedule please contact [facility] MACEY LINDSAY directly at [phone].  Normal or non-critical lab and imaging results will be communicated to you  "by Billtrustt, letter or phone within 4 business days after the clinic has received the results. If you do not hear from us within 7 days, please contact the clinic through Welltheon or phone. If you have a critical or abnormal lab result, we will notify you by phone as soon as possible.  Submit refill requests through Well[employer] or call your pharmacy and they will forward the refill request to us. Please allow 3 business days for your refill to be completed.          Additional Information About Your Visit        Well[employer]on Information     [employer] gives you secure access to your electronic health record. If you see a primary care provider, you can also send messages to your care team and make appointments. If you have questions, please call your primary care clinic.  If you do not have a primary care provider, please call [phone] and they will assist you.        Care EveryWhere ID     This is your Care EveryWhere ID. This could be used by other organizations to access your [facility] medical records  [id number]        Your Vitals Were     Pulse Temperature Height Pulse Oximetry BMI (Body Mass Index)       54 97.6  F (36.4  C) (Oral) 6' 1\" (1.854 m) 96% 27.05 kg/m2        Blood Pressure from Last 3 Encounters:   05/23/17 138/78   03/06/17 141/90   11/29/16 138/70    Weight from Last 3 Encounters:   05/23/17 205 lb (93 kg)   03/06/17 205 lb (93 kg)   11/15/16 208 lb (94.3 kg)              We Performed the Following     ALT     Basic metabolic panel     Lipid panel reflex to direct LDL          Today's Medication Changes          These changes are accurate as of: 5/23/17  7:54 AM.  If you have any questions, ask your nurse or doctor.               Start taking these medicines.        Dose/Directions    lisinopril 20 MG tablet   Commonly known as:  PRINIVIL/ZESTRIL   Used for:  Essential hypertension with goal blood pressure less than 140/90   Started by:  Mango Gilliland MD        Dose:  20 mg   Take 1 tablet (20 mg) by " mouth daily for blood pressure.   Quantity:  90 tablet   Refills:  1         These medicines have changed or have updated prescriptions.        Dose/Directions    simvastatin 20 MG tablet   Commonly known as:  ZOCOR   This may have changed:  See the new instructions.   Used for:  Hyperlipidemia LDL goal <130   Changed by:  Mango Gilliland MD        Dose:  20 mg   Take 1 tablet (20 mg) by mouth every evening for cholesterol.   Quantity:  90 tablet   Refills:  0         Stop taking these medicines if you haven't already. Please contact your care team if you have questions.     hydrochlorothiazide 25 MG tablet   Commonly known as:  HYDRODIURIL   Stopped by:  Mango Gilliland MD           potassium chloride 10 MEQ tablet   Commonly known as:  K-TAB,KLOR-CON   Stopped by:  Mango Gilliland MD                Where to get your medicines      These medications were sent to [place] [address] [place] [facility], [place] - [address]  [address] [facility] [address]     Phone:  [phone]     lisinopril 20 MG tablet    simvastatin 20 MG tablet                Primary Care Provider Office Phone # Fax #    Mango Gilliland [phone]       [facility] [address] [facility] [address]        Thank you!     Thank you for choosing [facility]  for your care. Our goal is always to provide you with excellent care. Hearing back from our patients is one way we can continue to improve our services. Please take a few minutes to complete the written survey that you may receive in the mail after your visit with us. Thank you!             Your Updated Medication List - Protect others around you: Learn how to safely use, store and throw away your medicines at www.disposemymeds.org.          This list is accurate as of: 5/23/17  7:54 AM.  Always use your most recent med list.                   Brand Name Dispense Instructions for use    aspirin 81 MG tablet      1 tablet  daily. *.       ibuprofen 200 MG tablet    ADVIL/MOTRIN     2-3 tabs every 4 hours (ave 3 times per week)       lisinopril 20 MG tablet    PRINIVIL/ZESTRIL    90 tablet    Take 1 tablet (20 mg) by mouth daily for blood pressure.       sildenafil 20 MG tablet    REVATIO/VIAGRA    50 tablet    Take 1-5 tablets ( mg) by mouth daily as needed , as directed, 1-3 hours before intimacy. Maximum 1 dose per 24 hours. Never use with nitroglycerin, terazosin or doxazosin.       simvastatin 20 MG tablet    ZOCOR    90 tablet    Take 1 tablet (20 mg) by mouth every evening for cholesterol.

## 2017-05-23 NOTE — PROGRESS NOTES
SUBJECTIVE:                                                            Steve Herring is a 71 year old male who presents for Preventive Visit.    Are you in the first 12 months of your Medicare Part B coverage?  No    Healthy Habits:    Do you get at least three servings of calcium containing foods daily (dairy, green leafy vegetables, etc.)? yes    Amount of exercise or daily activities, outside of work: 3 day(s) per week    Problems taking medications regularly No    Medication side effects: No    Have you had an eye exam in the past two years? yes    Do you see a dentist twice per year? yes    Do you have sleep apnea, excessive snoring or daytime drowsiness?yes snore    COGNITIVE SCREEN  1) Repeat 3 items (Banana, Sunrise, Chair)    2) Clock draw: NORMAL  3) 3 item recall: Recalls 3 objects  Results: 3 items recalled: COGNITIVE IMPAIRMENT LESS LIKELY    Mini-CogTM Copyright S Genia. Licensed by the author for use in [employer]; reprinted with permission (noble@[facility]). All rights reserved.            Hyperlipidemia Follow-Up      Rate your low fat/cholesterol diet?: fair    Taking statin?  Yes, no muscle aches from statin    Other lipid medications/supplements?:  none     Hypertension Follow-up      Outpatient blood pressures are not being checked.    Low Salt Diet: no added salt         Social History   Substance Use Topics     Smoking status: Never Smoker     Smokeless tobacco: Never Used     Alcohol use Yes      Comment: seldom       The patient does not drink >3 drinks per day nor >7 drinks per week.    Today's PHQ-2 Score:   PHQ-2 ( 1999 Pfizer) 5/23/2017 5/1/2016   Q1: Little interest or pleasure in doing things 0 -   Q2: Feeling down, depressed or hopeless 0 -   PHQ-2 Score 0 -   Little interest or pleasure in doing things - Not at all   Feeling down, depressed or hopeless - Not at all   PHQ-2 Score - 0       Do you feel safe in your environment - Yes    Do you have a Health Care  "Directive?: No: Advance care planning was reviewed with patient; patient declined at this time.    Current providers sharing in care for this patient include:   Patient Care Team:  Mango Gilliland MD as PCP - General      Hearing impairment: No    Ability to successfully perform activities of daily living: Yes, no assistance needed     Fall risk:  Fallen 2 or more times in the past year?: No  Any fall with injury in the past year?: No    Home safety:  none identified  click delete button to remove this line now    The following health maintenance items are reviewed in Epic and correct as of today:  Health Maintenance   Topic Date Due     COLONOSCOPY Q5 YR INBASKET MESSAGE  04/12/2017     INFLUENZA VACCINE (SYSTEM ASSIGNED)  09/01/2017     TETANUS IMMUNIZATION (SYSTEM ASSIGNED)  09/17/2017     FALL RISK ASSESSMENT  03/06/2018     LIPID MONITORING Q2 YEARS (NO INBASKET)  11/15/2018     ADVANCE DIRECTIVE PLANNING Q5 YRS (NO INBASKET)  05/03/2021     PNEUMOCOCCAL  Completed     AORTIC ANEURYSM SCREENING (SYSTEM ASSIGNED)  Completed     HEPATITIS C SCREENING  Completed       ROS:  Constitutional, HEENT, cardiovascular, pulmonary, GI, , musculoskeletal, neuro, skin, endocrine and psych systems are negative, except as otherwise noted.    Past medical, family, and social histories, medications, and allergies are reviewed and updated in Epic.   OBJECTIVE:                                                            /78  Pulse 54  Temp 97.6  F (36.4  C) (Oral)  Ht 6' 1\" (1.854 m)  Wt 205 lb (93 kg)  SpO2 96%  BMI 27.05 kg/m2 Estimated body mass index is 27.05 kg/(m^2) as calculated from the following:    Height as of this encounter: 6' 1\" (1.854 m).    Weight as of this encounter: 205 lb (93 kg).  EXAM:   GENERAL: healthy, alert and no distress  EYES: Eyes grossly normal to inspection, PERRL and conjunctivae and sclerae normal  HENT: ear canals and TM's normal, nose and mouth without ulcers or lesions  NECK: no " adenopathy, no asymmetry, masses, or scars and thyroid normal to palpation  RESP: lungs clear to auscultation - no rales, rhonchi or wheezes  CV: regular rate and rhythm, normal S1 S2, no S3 or S4, no murmur, click or rub, no peripheral edema and peripheral pulses strong  ABDOMEN: soft, nontender, no hepatosplenomegaly, no masses and bowel sounds normal   (male): normal male genitalia without lesions or urethral discharge, no hernia  MS: no gross musculoskeletal defects noted, no edema  SKIN: no suspicious lesions or rashes  NEURO: Normal strength and tone, mentation intact and speech normal  PSYCH: mentation appears normal, affect normal/bright    ASSESSMENT / PLAN:                                                            (Z00.00) Encounter for routine adult health examination without abnormal findings  (primary encounter diagnosis)  Comment: Negative screening exam; up-to-date on preventive services except colonoscopy.   Plan: f/u 1 year    (E78.5) Hyperlipidemia LDL goal <130  Comment: fasting, historically at goal   Plan: simvastatin (ZOCOR) 20 MG tablet, Lipid panel         reflex to direct LDL, ALT        F/u 6 mo    (I10) Essential hypertension with goal blood pressure less than 140/90  Comment: controlled, but I am changing his regimen  Plan: lisinopril (PRINIVIL/ZESTRIL) 20 MG tablet,         Basic metabolic panel        F/u 2 mo    (E87.6) Hypopotassemia  Comment:   Plan: Basic metabolic panel        D/C HCTZ, D/C K+ suppl, recheck in 2 mo    (Z86.010) History of adenomatous polyp of colon  Comment: due for colonoscopy  Plan: ARIANE Rubalcava has contacted him for this, but pt hasn't scheduled yet (procrastinating)    End of Life Planning:  Patient currently has an advanced directive: No    COUNSELING:  Reviewed preventive health counseling, as reflected in patient instructions       Colon cancer screening        Estimated body mass index is 27.05 kg/(m^2) as calculated from the following:    Height as of  "3/6/17: 6' 1\" (1.854 m).    Weight as of 3/6/17: 205 lb (93 kg).     reports that he has never smoked. He has never used smokeless tobacco.      Appropriate preventive services were discussed with this patient, including applicable screening as appropriate for cardiovascular disease, diabetes, osteopenia/osteoporosis, and glaucoma.  As appropriate for age/gender, discussed screening for colorectal cancer, prostate cancer, breast cancer, and cervical cancer. Checklist reviewing preventive services available has been given to the patient.    Reviewed patients plan of care and provided an AVS. The Basic Care Plan (routine screening as documented in Health Maintenance) for Steve meets the Care Plan requirement. This Care Plan has been established and reviewed with the Patient.    Counseling Resources:  ATP IV Guidelines  Pooled Cohorts Equation Calculator  Breast Cancer Risk Calculator  FRAX Risk Assessment  ICSI Preventive Guidelines  Dietary Guidelines for Americans, 2010  USDA's MyPlate  ASA Prophylaxis  Lung CA Screening    Mango Gilliland MD  [facility]  "

## 2017-05-23 NOTE — PATIENT INSTRUCTIONS
Preventive Health Recommendations:   Male Ages 65 and over    Yearly exam:             See your health care provider every year in order to  o   Review health changes.   o   Discuss preventive care.    o   Review your medicines if your doctor has prescribed any.    Talk with your health care provider about whether you should have a test to screen for prostate cancer (PSA).    Every 3 years, have a diabetes test (fasting glucose). If you are at risk for diabetes, you should have this test more often.    Every 5 years, have a cholesterol test. Have this test more often if you are at risk for high cholesterol or heart disease.     Every 10 years, have a colonoscopy. Or, have a yearly FIT test (stool test). These exams will check for colon cancer.    Talk to with your health care provider about screening for Abdominal Aortic Aneurysm if you have a family history of AAA or have a history of smoking.    Shots:     Get a flu shot each year.     Get a tetanus shot every 10 years.     Talk to your doctor about your pneumonia vaccines. There are now two you should receive - Pneumovax (PPSV 23) and Prevnar (PCV 13).     Talk to your doctor about a shingles vaccine.     Talk to your doctor about the hepatitis B vaccine.  Nutrition:     Eat at least 5 servings of fruits and vegetables each day.     Eat whole-grain bread, whole-wheat pasta and brown rice instead of white grains and rice.     Talk to your provider about Calcium and Vitamin D.   Lifestyle    Exercise for at least 150 minutes a week (30 minutes a day, 5 days a week). This will help you control your weight and prevent disease.     Limit alcohol to one drink per day.     No smoking.     Wear sunscreen to prevent skin cancer.     See your dentist every six months for an exam and cleaning.     See your eye doctor every 1 to 2 years to screen for conditions such as glaucoma, macular degeneration, cataracts, etc   Preventive Health Recommendations:   Male Ages 65 and  over    Yearly exam:             See your health care provider every year in order to  o   Review health changes.   o   Discuss preventive care.    o   Review your medicines if your doctor has prescribed any.  Talk with your health care provider about whether you should have a test to screen for prostate cancer (PSA).  Every 3 years, have a diabetes test (fasting glucose). If you are at risk for diabetes, you should have this test more often.  Every 5 years, have a cholesterol test. Have this test more often if you are at risk for high cholesterol or heart disease.   Every 10 years, have a colonoscopy. Or, have a yearly FIT test (stool test). These exams will check for colon cancer.  Talk to with your health care provider about screening for Abdominal Aortic Aneurysm if you have a family history of AAA or have a history of smoking.    Shots:   Get a flu shot each year.   Get a tetanus shot every 10 years.   Talk to your doctor about your pneumonia vaccines. There are now two you should receive - Pneumovax (PPSV 23) and Prevnar (PCV 13).   Talk to your doctor about a shingles vaccine.   Talk to your doctor about the hepatitis B vaccine.  Nutrition:   Eat at least 5 servings of fruits and vegetables each day.   Eat whole-grain bread, whole-wheat pasta and brown rice instead of white grains and rice.   Talk to your provider about Calcium and Vitamin D.   Lifestyle  Exercise for at least 150 minutes a week (30 minutes a day, 5 days a week). This will help you control your weight and prevent disease.   Limit alcohol to one drink per day.   No smoking.   Wear sunscreen to prevent skin cancer.   See your dentist every six months for an exam and cleaning.   See your eye doctor every 1 to 2 years to screen for conditions such as glaucoma, macular degeneration, cataracts, etc     This summary includes the important diagnoses, test, medications and other important parts of your medical history.  Below are a few good we sites  you can use to learn more about these.     Www.Te[employer] People.org : Up to date and easily searchable information on multiple topics.  Www.Teach The People.org/Pharmacy/c_539084.asp : [facility] $4.99 medications  Www.medlineplus.gov : medication info, interactive tutorials, watch real surgeries online  Www.familydoctor.org : good info from the Academy of Family Physicians  Www.Cards Offinic.FirstRide : good info from the [facility]  Www.cdc.gov : public health info, travel advisories, epidemics (H1N1)  Www.aap.org : children's health info, normal development, vaccinations  Www.health.[facility].[place].us : [place] dept of heat, public health issues in [place], N1N1    Based on your medical history and these are the current health maintenance or preventive care services that you are due for (some may have been done at this visit:)  Health Maintenance Due   Topic Date Due     COLONOSCOPY Q5 YR INBASKET MESSAGE  04/12/2017     =================================================================================  Normal Values   Blood pressure  <140/90 for most adults    <130/80 for some chronic diseases (ask your care team about yours)    BMI (body mass index)  18.5-25 kg/m2 (based on height and weight)     Thank you for visiting [facility]    Normal or non-critical lab and imaging results will be communicated to you by MyChart, letter or phone within 7 days.  If you do not hear from us within 10 days, please call the clinic. If you have a critical or abnormal lab result, we will notify you by phone as soon as possible.     If you have any questions regarding your visit please contact:     Team Comfort:   Clinic Hours Telephone Number   Dr. Mango Ferrera   7am-5pm  Monday - Friday [phone]  Jayesh SOLIMAN   Pharmacy 8am-8pm Monday-Thursday      8am-6pm Friday  9am-5pm Saturday-Sunday [phone]   Urgent Care 11am-8pm Monday-Friday        9am-5pm  Saturday-Sunday [phone]     After hours, weekend or if you need to make an appointment with your primary provider please call [phone].   After Hours nurse advise: call [facility]: [phone]    Medication Refills:  Call your pharmacy and they will forward the refill to us. Please allow 3 business days for your refills to be completed.    Use Davia (secure email communication and access to your chart) to send your primary care provider a message or make an appointment. Ask someone on your Team how to sign up for Davia. To log on to Khush or for more information in [email or web address] please visit the website at [email or web address].  As of October 8, 2013, all password changes, disabled accounts, or ID changes in Davia/MyHealth will be done by our Access Services Department.   If you need help with your account or password, call: [phone]. Clinic staff no longer has the ability to change passwords.

## 2017-05-23 NOTE — NURSING NOTE
"Chief Complaint   Patient presents with     Physical       Initial /74 (BP Location: Left arm, Patient Position: Chair, Cuff Size: Adult Regular)  Pulse 54  Temp 97.6  F (36.4  C) (Oral)  Ht 6' 1\" (1.854 m)  Wt 205 lb (93 kg)  SpO2 96%  BMI 27.05 kg/m2 Estimated body mass index is 27.05 kg/(m^2) as calculated from the following:    Height as of this encounter: 6' 1\" (1.854 m).    Weight as of this encounter: 205 lb (93 kg).  Medication Reconciliation: complete     Leo Caldwell MA      "

## 2017-05-23 NOTE — PROGRESS NOTES
"  SUBJECTIVE:     CC: Steve Herring is an 71 year old male who presents for preventative health visit.     Healthy Habits:    Do you get at least three servings of calcium containing foods daily (dairy, green leafy vegetables, etc.)? {YES/NO, DAIRY INTAKE:356678::\"yes\"}    Amount of exercise or daily activities, outside of work: {AMOUNT EXERCISE:509499}    Problems taking medications regularly {Yes /No default:770221::\"No\"}    Medication side effects: {Yes /No default.:805489::\"No\"}    Have you had an eye exam in the past two years? {YESNOBLANK:513573}    Do you see a dentist twice per year? {YESNOBLANK:973815}    Do you have sleep apnea, excessive snoring or daytime drowsiness?{YESNOBLANK:848852}    {Outside tests to abstract? :426119}    {additional problems to add:601825}    Today's PHQ-2 Score:   PHQ-2 ( 1999 Pfizer) 5/1/2016 4/18/2015   Little interest or pleasure in doing things Not at all Not at all   Feeling down, depressed or hopeless Not at all Not at all   PHQ-2 Score 0 0     {PHQ-2 LOOK IN ASSESSMENTS :422709}  Abuse: Current or Past(Physical, Sexual or Emotional)- {YES/NO/NA:170548}  Do you feel safe in your environment - {YES/NO/NA:900440}    Social History   Substance Use Topics     Smoking status: Never Smoker     Smokeless tobacco: Never Used     Alcohol use Yes      Comment: seldom     {ETOH AUDIT:993046}    Last PSA:   PSA   Date Value Ref Range Status   02/06/2012 1.41 0 - 4 ug/L Final       Recent Labs   Lab Test  11/15/16   0822  05/03/16   0822  10/27/15   0916  04/21/15   0838   CHOL  120  125  153  158   HDL  38*  45  47  43   LDL  53  48  75  77   TRIG  145  161*  155*  192*   CHOLHDLRATIO   --    --   3.3  3.7   NHDL  82  80   --    --        Reviewed orders with patient. Reviewed health maintenance and updated orders accordingly - {Yes/No:460543::\"Yes\"}    Reviewed and updated as needed this visit by clinical staff         Reviewed and updated as needed this visit by Provider      " "  {HISTORY OPTIONS:712312}    ROS:  {MALE ROS-adult preventive care package:503128::\"C: NEGATIVE for fever, chills, change in weight\",\"I: NEGATIVE for worrisome rashes, moles or lesions\",\"E: NEGATIVE for vision changes or irritation\",\"ENT: NEGATIVE for ear, mouth and throat problems\",\"R: NEGATIVE for significant cough or SOB\",\"CV: NEGATIVE for chest pain, palpitations or peripheral edema\",\"GI: NEGATIVE for nausea, abdominal pain, heartburn, or change in bowel habits\",\" male: negative for dysuria, hematuria, decreased urinary stream, erectile dysfunction, urethral discharge\",\"M: NEGATIVE for significant arthralgias or myalgia\",\"N: NEGATIVE for weakness, dizziness or paresthesias\",\"P: NEGATIVE for changes in mood or affect\"}    {CHRONICPROBDATA:081582}  OBJECTIVE:     /74 (BP Location: Left arm, Patient Position: Chair, Cuff Size: Adult Regular)  EXAM:  {Exam Choices:639982}    ASSESSMENT/PLAN:     {Diag Picklist:234742}    COUNSELING:  {MALE COUNSELING MESSAGES:531976::\"Reviewed preventive health counseling, as reflected in patient instructions\"}    {Blood Pressure - Adult Preventive:309113}     reports that he has never smoked. He has never used smokeless tobacco.  {Tobacco Cessation needed for ACO -- Delete if patient is a non-smoker:367357}  Estimated body mass index is 27.05 kg/(m^2) as calculated from the following:    Height as of 3/6/17: 6' 1\" (1.854 m).    Weight as of 3/6/17: 205 lb (93 kg).   {Weight Management Plan needed for ACO:237550}    Counseling Resources:  ATP IV Guidelines  Pooled Cohorts Equation Calculator  FRAX Risk Assessment  ICSI Preventive Guidelines  Dietary Guidelines for Americans, 2010  USDA's MyPlate  ASA Prophylaxis  Lung CA Screening    Mango Gilliland MD  [facility]  "

## 2017-07-31 ENCOUNTER — OFFICE VISIT (OUTPATIENT)
Dept: FAMILY MEDICINE | Facility: CLINIC | Age: 72
End: 2017-07-31
Payer: COMMERCIAL

## 2017-07-31 VITALS
WEIGHT: 197 LBS | HEIGHT: 73 IN | OXYGEN SATURATION: 98 % | DIASTOLIC BLOOD PRESSURE: 64 MMHG | HEART RATE: 60 BPM | SYSTOLIC BLOOD PRESSURE: 108 MMHG | TEMPERATURE: 98.4 F | BODY MASS INDEX: 26.11 KG/M2

## 2017-07-31 DIAGNOSIS — I10 ESSENTIAL HYPERTENSION WITH GOAL BLOOD PRESSURE LESS THAN 140/90: Primary | ICD-10-CM

## 2017-07-31 LAB — POTASSIUM SERPL-SCNC: 4.1 MMOL/L (ref 3.4–5.3)

## 2017-07-31 PROCEDURE — 84132 ASSAY OF SERUM POTASSIUM: CPT | Performed by: FAMILY MEDICINE

## 2017-07-31 PROCEDURE — 36415 COLL VENOUS BLD VENIPUNCTURE: CPT | Performed by: FAMILY MEDICINE

## 2017-07-31 PROCEDURE — 99213 OFFICE O/P EST LOW 20 MIN: CPT | Performed by: FAMILY MEDICINE

## 2017-07-31 ASSESSMENT — PAIN SCALES - GENERAL: PAINLEVEL: NO PAIN (0)

## 2017-07-31 NOTE — PROGRESS NOTES
"  SUBJECTIVE:                                                    Steve Herring is a 71 year old male who presents to clinic today for the following health issues:      Hypertension Follow-up      Outpatient blood pressures are being checked at home.  Results are around today's visit .    Low Salt Diet: no added salt      Amount of exercise or physical activity: 3 days/week for an average of greater than 60 minutes    Problems taking medications regularly: No    Medication side effects: none  Diet: regular (no restrictions)    Past medical, family, and social histories, medications, and allergies are reviewed and updated in Epic.     ROS:  C: NEGATIVE for fever, chills, change in weight  E/M: NEGATIVE for ear, mouth and throat problems  R: NEGATIVE for significant cough or SOB  CV: NEGATIVE for chest pain, palpitations or peripheral edema  ROS otherwise negative    Any history above obtained by the Medical Assistant was reviewed by Dr. Mango Gilliland MD, and edited when necessary.    This document serves as a record of the services and decisions personally performed and made by Dr. Gilliland. It was created on his behalf by Idnu Abreu, a trained medical scribe. The creation of this document is based the provider's statements to the medical scribe.  Indu Abreu July 31, 2017 9:05 AM     OBJECTIVE:                                                    /64  Pulse 60  Temp 98.4  F (36.9  C) (Oral)  Ht 1.854 m (6' 1\")  Wt 89.4 kg (197 lb)  SpO2 98%  BMI 25.99 kg/m2   Body mass index is 25.99 kg/(m^2).     GENERAL: healthy, alert and no distress  EYES: Eyes grossly normal to inspection, PERRL, EOMI, sclerae white and conjunctivae normal  MS: no gross musculoskeletal defects noted, no edema  SKIN: no suspicious lesions or rashes  NEURO: Normal strength and tone, sensory exam grossly normal, mentation intact, oriented times 3 and cranial nerves 2-12 intact  PSYCH: mentation appears normal, affect " normal/bright     Diagnostic Test Results:  none      ASSESSMENT/PLAN:                                                      (I10) Essential hypertension with goal blood pressure less than 140/90  (primary encounter diagnosis)  Comment: Well controlled on new regimen.  Plan: Potassium        Recheck in 4 months. He can cut his pill in half if he is noting frequent lightheadedness.       The information in this document, created by the medical scribe for me, accurately reflects the services I personally performed and the decisions made by me. I have reviewed and approved this document for accuracy prior to leaving the patient care area. July 31, 2017 9:05 AM   Mango Gilliland MD

## 2017-07-31 NOTE — MR AVS SNAPSHOT
After Visit Summary   7/31/2017    Steve Herring    MRN: [id number]           Patient Information     Date Of Birth          1945        Visit Information        Provider Department      7/31/2017 8:40 AM Mango Gilliland MD [facility]        Today's Diagnoses     Essential hypertension with goal blood pressure less than 140/90    -  1      Care Instructions        ================================================================================  Normal Values   Blood pressure  <140/90 for most adults    <130/80 for some chronic diseases (ask your care team about yours)    BMI (body mass index)  18.5-25 kg/m2 (based on height and weight)     Thank you for visiting [facility]    Normal or non-critical lab and imaging results will be communicated to you by MyChart, letter or phone within 7 days.  If you do not hear from us within 10 days, please call the clinic. If you have a critical or abnormal lab result, we will notify you by phone as soon as possible.     If you have any questions regarding your visit please contact:     Team Comfort:   Clinic Hours Telephone Number   Dr. Mango Miranda 7am-5pm  Monday - Friday [phone]  Christian Hampton RN   Pharmacy 8:00am-8pm Monday-Friday    9am-5pm Saturday-Sunday [phone]   Urgent Care 11am-9pm Monday-Friday        9am-5pm Saturday-Sunday [phone]     After hours, weekend or if you need to make an appointment with your primary provider please call [phone].   After Hours nurse advise: call [facility]: [phone]    Medication Refills:  Call your pharmacy and they will forward the refill to us. Please allow 3 business days for your refills to be completed.                  Follow-ups after your visit        Follow-up notes from your care team     Return in about 4 months (around 11/23/2017) for blood  "pressure check.      Who to contact     If you have questions or need follow up information about today's clinic visit or your schedule please contact [facility] directly at [phone].  Normal or non-critical lab and imaging results will be communicated to you by MyChart, letter or phone within 4 business days after the clinic has received the results. If you do not hear from us within 7 days, please contact the clinic through MyChart or phone. If you have a critical or abnormal lab result, we will notify you by phone as soon as possible.  Submit refill requests through [employer] or call your pharmacy and they will forward the refill request to us. Please allow 3 business days for your refill to be completed.          Additional Information About Your Visit        [email or web address] Information     [employer] gives you secure access to your electronic health record. If you see a primary care provider, you can also send messages to your care team and make appointments. If you have questions, please call your primary care clinic.  If you do not have a primary care provider, please call [phone] and they will assist you.        Care EveryWhere ID     This is your Care EveryWhere ID. This could be used by other organizations to access your [facility] medical records  [id number]        Your Vitals Were     Pulse Temperature Height Pulse Oximetry BMI (Body Mass Index)       90 98.4  F (36.9  C) (Oral) 6' 1\" (1.854 m) 98% 25.99 kg/m2        Blood Pressure from Last 3 Encounters:   07/31/17 108/64   05/23/17 138/78   03/06/17 141/90    Weight from Last 3 Encounters:   07/31/17 197 lb (89.4 kg)   05/23/17 205 lb (93 kg)   03/06/17 205 lb (93 kg)              We Performed the Following     Potassium        Primary Care Provider Office Phone # Fax #    Mango Gilliland [phone]       [facility] [address]  [facility] [address]        Equal Access to Services     OLGA SOLOMON " AH: Hadii cd hermosillo perez Sofransiscaali, waaxda luqadaha, qaybta kaalmada adebenita, tanisha jelenain hayaalisa wrightlucius cooper laVickiemk joseph. So [facility] [phone].    ATENCIÓN: Si amritla octavio, tiene a hernandez disposición servicios gratuitos de asistencia lingüística. Llame al [phone].    We comply with applicable federal civil rights laws and [place] laws. We do not discriminate on the basis of race, color, national origin, age, disability sex, sexual orientation or gender identity.            Thank you!     Thank you for choosing [facility]  for your care. Our goal is always to provide you with excellent care. Hearing back from our patients is one way we can continue to improve our services. Please take a few minutes to complete the written survey that you may receive in the mail after your visit with us. Thank you!             Your Updated Medication List - Protect others around you: Learn how to safely use, store and throw away your medicines at www.disposemymeds.org.          This list is accurate as of: 7/31/17  9:10 AM.  Always use your most recent med list.                   Brand Name Dispense Instructions for use Diagnosis    aspirin 81 MG tablet      1 tablet daily. *.        ibuprofen 200 MG tablet    ADVIL/MOTRIN     2-3 tabs every 4 hours (ave 3 times per week)        lisinopril 20 MG tablet    PRINIVIL/ZESTRIL    90 tablet    Take 1 tablet (20 mg) by mouth daily for blood pressure.    Essential hypertension with goal blood pressure less than 140/90       sildenafil 20 MG tablet    REVATIO/VIAGRA    50 tablet    Take 1-5 tablets ( mg) by mouth daily as needed , as directed, 1-3 hours before intimacy. Maximum 1 dose per 24 hours. Never use with nitroglycerin, terazosin or doxazosin.    Erectile dysfunction, unspecified erectile dysfunction type       simvastatin 20 MG tablet    ZOCOR    90 tablet    Take 1 tablet (20 mg) by mouth every evening for cholesterol.    Hyperlipidemia LDL goal <130

## 2017-07-31 NOTE — PATIENT INSTRUCTIONS
================================================================================  Normal Values   Blood pressure  <140/90 for most adults    <130/80 for some chronic diseases (ask your care team about yours)    BMI (body mass index)  18.5-25 kg/m2 (based on height and weight)     Thank you for visiting [facility]    Normal or non-critical lab and imaging results will be communicated to you by MyChart, letter or phone within 7 days.  If you do not hear from us within 10 days, please call the clinic. If you have a critical or abnormal lab result, we will notify you by phone as soon as possible.     If you have any questions regarding your visit please contact:     Team Comfort:   Clinic Hours Telephone Number   Dr. Mango Miranda 7am-5pm  Monday - Friday [phone]  Christian Hampton RN   Pharmacy 8:00am-8pm Monday-Friday    9am-5pm Saturday-Sunday [phone]   Urgent Care 11am-9pm Monday-Friday        9am-5pm Saturday-Sunday [phone]     After hours, weekend or if you need to make an appointment with your primary provider please call [phone].   After Hours nurse advise: call [facility]: [phone]    Medication Refills:  Call your pharmacy and they will forward the refill to us. Please allow 3 business days for your refills to be completed.

## 2017-08-14 ENCOUNTER — TRANSFERRED RECORDS (OUTPATIENT)
Dept: HEALTH INFORMATION MANAGEMENT | Facility: CLINIC | Age: 72
End: 2017-08-14

## 2017-10-18 DIAGNOSIS — E78.5 HYPERLIPIDEMIA LDL GOAL <130: ICD-10-CM

## 2017-10-24 RX ORDER — SIMVASTATIN 20 MG
TABLET ORAL
Qty: 90 TABLET | Refills: 1 | Status: SHIPPED | OUTPATIENT
Start: 2017-10-24 | End: 2017-12-05

## 2017-12-05 ENCOUNTER — OFFICE VISIT (OUTPATIENT)
Dept: FAMILY MEDICINE | Facility: CLINIC | Age: 72
End: 2017-12-05
Payer: COMMERCIAL

## 2017-12-05 VITALS
DIASTOLIC BLOOD PRESSURE: 62 MMHG | SYSTOLIC BLOOD PRESSURE: 130 MMHG | TEMPERATURE: 98.7 F | BODY MASS INDEX: 26.24 KG/M2 | HEIGHT: 73 IN | WEIGHT: 198 LBS | OXYGEN SATURATION: 98 % | HEART RATE: 76 BPM

## 2017-12-05 DIAGNOSIS — E78.5 HYPERLIPIDEMIA LDL GOAL <130: ICD-10-CM

## 2017-12-05 DIAGNOSIS — I10 ESSENTIAL HYPERTENSION WITH GOAL BLOOD PRESSURE LESS THAN 140/90: Primary | ICD-10-CM

## 2017-12-05 DIAGNOSIS — Z23 NEED FOR PROPHYLACTIC VACCINATION WITH TETANUS-DIPHTHERIA (TD): ICD-10-CM

## 2017-12-05 LAB
ALT SERPL W P-5'-P-CCNC: 23 U/L (ref 0–70)
CHOLEST SERPL-MCNC: 139 MG/DL
CREAT SERPL-MCNC: 1.08 MG/DL (ref 0.66–1.25)
GFR SERPL CREATININE-BSD FRML MDRD: 68 ML/MIN/1.7M2
HDLC SERPL-MCNC: 45 MG/DL
LDLC SERPL CALC-MCNC: 56 MG/DL
NONHDLC SERPL-MCNC: 94 MG/DL
POTASSIUM SERPL-SCNC: 4.7 MMOL/L (ref 3.4–5.3)
TRIGL SERPL-MCNC: 188 MG/DL

## 2017-12-05 PROCEDURE — 99214 OFFICE O/P EST MOD 30 MIN: CPT | Mod: 25 | Performed by: FAMILY MEDICINE

## 2017-12-05 PROCEDURE — 84460 ALANINE AMINO (ALT) (SGPT): CPT | Performed by: FAMILY MEDICINE

## 2017-12-05 PROCEDURE — 90714 TD VACC NO PRESV 7 YRS+ IM: CPT | Performed by: FAMILY MEDICINE

## 2017-12-05 PROCEDURE — 90471 IMMUNIZATION ADMIN: CPT | Performed by: FAMILY MEDICINE

## 2017-12-05 PROCEDURE — 80061 LIPID PANEL: CPT | Performed by: FAMILY MEDICINE

## 2017-12-05 PROCEDURE — 36415 COLL VENOUS BLD VENIPUNCTURE: CPT | Performed by: FAMILY MEDICINE

## 2017-12-05 PROCEDURE — 82565 ASSAY OF CREATININE: CPT | Performed by: FAMILY MEDICINE

## 2017-12-05 PROCEDURE — 84132 ASSAY OF SERUM POTASSIUM: CPT | Performed by: FAMILY MEDICINE

## 2017-12-05 RX ORDER — LISINOPRIL 10 MG/1
10 TABLET ORAL DAILY
Qty: 90 TABLET | Refills: 0 | Status: SHIPPED | OUTPATIENT
Start: 2017-12-05 | End: 2018-01-25

## 2017-12-05 RX ORDER — SIMVASTATIN 20 MG
TABLET ORAL
Qty: 90 TABLET | Refills: 0 | Status: SHIPPED | OUTPATIENT
Start: 2017-12-05 | End: 2018-01-25

## 2017-12-05 NOTE — NURSING NOTE
Screening Questionnaire for Adult Immunization    Are you sick today?   No   Do you have allergies to medications, food, a vaccine component or latex?   Yes   Have you ever had a serious reaction after receiving a vaccination?   No   Do you have a long-term health problem with heart disease, lung disease, asthma, kidney disease, metabolic disease (e.g. diabetes), anemia, or other blood disorder?   No   Do you have cancer, leukemia, HIV/AIDS, or any other immune system problem?   No   In the past 3 months, have you taken medications that affect  your immune system, such as prednisone, other steroids, or anticancer drugs; drugs for the treatment of rheumatoid arthritis, Crohn s disease, or psoriasis; or have you had radiation treatments?   Yes   Have you had a seizure, or a brain or other nervous system problem?   No   During the past year, have you received a transfusion of blood or blood     products, or been given immune (gamma) globulin or antiviral drug?   No   For women: Are you pregnant or is there a chance you could become        pregnant during the next month?   No   Have you received any vaccinations in the past 4 weeks?   No     Immunization questionnaire was positive for at least one answer.  Notified Dr. Gilliland.      Screening performed by Leo Caldwell on 12/5/2017 at 8:36 AM.

## 2017-12-05 NOTE — MR AVS SNAPSHOT
After Visit Summary   12/5/2017    Steve Herring    MRN: [id number]           Patient Information     Date Of Birth          1945        Visit Information        Provider Department      12/5/2017 7:40 AM Mango Gilliland MD [facility]        Today's Diagnoses     Essential hypertension with goal blood pressure less than 140/90    -  1    Hyperlipidemia LDL goal <130        Need for prophylactic vaccination with tetanus-diphtheria (TD)           Follow-ups after your visit        Follow-up notes from your care team     Return for full physical, cholesterol, blood pressure check when you are in need of refills.      Who to contact     If you have questions or need follow up information about today's clinic visit or your schedule please contact [facility] directly at [phone].  Normal or non-critical lab and imaging results will be communicated to you by MyChart, letter or phone within 4 business days after the clinic has received the results. If you do not hear from us within 7 days, please contact the clinic through MyChart or phone. If you have a critical or abnormal lab result, we will notify you by phone as soon as possible.  Submit refill requests through [employer] or call your pharmacy and they will forward the refill request to us. Please allow 3 business days for your refill to be completed.          Additional Information About Your Visit        MyChart Information     [employer] gives you secure access to your electronic health record. If you see a primary care provider, you can also send messages to your care team and make appointments. If you have questions, please call your primary care clinic.  If you do not have a primary care provider, please call [phone] and they will assist you.        Care EveryWhere ID     This is your Care EveryWhere ID. This could be used by other organizations to access your [facility]  "records  [id number]        Your Vitals Were     Pulse Temperature Height Pulse Oximetry BMI (Body Mass Index)       76 98.7  F (37.1  C) (Oral) 6' 1\" (1.854 m) 98% 26.12 kg/m2        Blood Pressure from Last 3 Encounters:   12/05/17 130/62   07/31/17 108/64   05/23/17 138/78    Weight from Last 3 Encounters:   12/05/17 198 lb (89.8 kg)   07/31/17 197 lb (89.4 kg)   05/23/17 205 lb (93 kg)              We Performed the Following     ALT     Creatinine     Lipid panel reflex to direct LDL Fasting     Potassium     TD (ADULT, 7+) PRESERVE FREE          Today's Medication Changes          These changes are accurate as of: 12/5/17  8:19 AM.  If you have any questions, ask your nurse or doctor.               These medicines have changed or have updated prescriptions.        Dose/Directions    lisinopril 10 MG tablet   Commonly known as:  PRINIVIL/ZESTRIL   This may have changed:    - medication strength  - how much to take   Used for:  Essential hypertension with goal blood pressure less than 140/90   Changed by:  Mango Gilliland MD        Dose:  10 mg   Take 1 tablet (10 mg) by mouth daily for blood pressure.   Quantity:  90 tablet   Refills:  0       simvastatin 20 MG tablet   Commonly known as:  ZOCOR   This may have changed:  See the new instructions.   Used for:  Hyperlipidemia LDL goal <130   Changed by:  Mango Gilliland MD        TAKE 1 TABLET (20 MG) BY MOUTH EVERY EVENING FOR CHOLESTEROL.   Quantity:  90 tablet   Refills:  0            Where to get your medicines      These medications were sent to [facility] [address] [facility] - [place], [place] - [address]  [address] [address]     Phone:  [phone]     lisinopril 10 MG tablet    simvastatin 20 MG tablet                Primary Care Provider Office Phone # Fax #    Mango Gilliland [phone]       [address]  [place] [address]        Equal Access to Services     OLGA SOLOMON AH: Hadii dc Escobar, " waaxda luqadaha, qaybta kaalmada jose armando, tanisha stringerlisa opal. So [facility] [phone].    ATENCIÓN: Si mumtaz cunningham, tiene a hernandez disposición servicios gratuitos de asistencia lingüística. Dory al [phone].    We comply with applicable federal civil rights laws and [place] laws. We do not discriminate on the basis of race, color, national origin, age, disability, sex, sexual orientation, or gender identity.            Thank you!     Thank you for choosing [facility]  for your care. Our goal is always to provide you with excellent care. Hearing back from our patients is one way we can continue to improve our services. Please take a few minutes to complete the written survey that you may receive in the mail after your visit with us. Thank you!             Your Updated Medication List - Protect others around you: Learn how to safely use, store and throw away your medicines at www.disposemymeds.org.          This list is accurate as of: 12/5/17  8:19 AM.  Always use your most recent med list.                   Brand Name Dispense Instructions for use Diagnosis    aspirin 81 MG tablet      1 tablet daily. *.        ibuprofen 200 MG tablet    ADVIL/MOTRIN     2-3 tabs every 4 hours (ave 3 times per week)        lisinopril 10 MG tablet    PRINIVIL/ZESTRIL    90 tablet    Take 1 tablet (10 mg) by mouth daily for blood pressure.    Essential hypertension with goal blood pressure less than 140/90       sildenafil 20 MG tablet    REVATIO    50 tablet    Take 1-5 tablets ( mg) by mouth daily as needed , as directed, 1-3 hours before intimacy. Maximum 1 dose per 24 hours. Never use with nitroglycerin, terazosin or doxazosin.    Erectile dysfunction, unspecified erectile dysfunction type       simvastatin 20 MG tablet    ZOCOR    90 tablet    TAKE 1 TABLET (20 MG) BY MOUTH EVERY EVENING FOR CHOLESTEROL.    Hyperlipidemia LDL goal <130

## 2017-12-05 NOTE — PROGRESS NOTES
"  SUBJECTIVE:   Steve Herring is a 72 year old male who presents to clinic today for the following health issues:    Hyperlipidemia Follow-Up      Rate your low fat/cholesterol diet?: fair    Taking statin?  Yes, no muscle aches from statin    Other lipid medications/supplements?:  none    Hypertension Follow-up      Outpatient blood pressures are being checked at home.  Results are 120/78.    Low Salt Diet: no added salt    He has been cutting his lisinopril in half (10 mg) because he was getting low blood pressure readings (systolic in the 90s).          Amount of exercise or physical activity: 2-3 days/week for an average of greater than 60 minutes    Problems taking medications regularly: No    Medication side effects: none  Diet: low salt      Past medical, family, and social histories, medications, and allergies are reviewed and updated in Epic.     ROS:  C: NEGATIVE for fever, chills, change in weight  E/M: NEGATIVE for ear, mouth and throat problems  R: NEGATIVE for significant cough or SOB  CV: NEGATIVE for chest pain, palpitations or peripheral edema  ROS otherwise negative    This document serves as a record of the services and decisions personally performed and made by Dr. Gilliland. It was created on his behalf by Indu Abreu, a trained medical scribe. The creation of this document is based the provider's statements to the medical scribe.  Indu Abreu December 5, 2017 8:02 AM     OBJECTIVE:                                                    /62  Pulse 76  Temp 98.7  F (37.1  C) (Oral)  Ht 1.854 m (6' 1\")  Wt 89.8 kg (198 lb)  SpO2 98%  BMI 26.12 kg/m2   Body mass index is 26.12 kg/(m^2).     GENERAL: healthy, alert and no distress  EYES: Eyes grossly normal to inspection, PERRL, EOMI, sclerae white and conjunctivae normal  RESP: lungs clear to auscultation - no crackles or wheezes, no areas of dullness, no tachypnea  CV: Heart regular rate and rhythm without murmur, click or rub. No " peripheral edema and peripheral pulses strong  MS: no gross musculoskeletal defects noted, no edema  SKIN: no suspicious lesions or rashes  NEURO: Normal strength and tone, sensory exam grossly normal, mentation intact, oriented times 3 and cranial nerves 2-12 intact  PSYCH: mentation appears normal, affect normal/bright     Diagnostic Test Results:  none      ASSESSMENT/PLAN:                                                      (I10) Essential hypertension with goal blood pressure less than 140/90  (primary encounter diagnosis)  Comment: Well controlled on 10 mg of lisinopril.   Plan: Potassium, Creatinine, lisinopril         (PRINIVIL/ZESTRIL) 10 MG tablet        Return for full physical, cholesterol, blood pressure check when he needs refills.     (E78.5) Hyperlipidemia LDL goal <130  Comment: Fasting. historically at goal.  Plan: Lipid panel reflex to direct LDL Fasting,         simvastatin (ZOCOR) 20 MG tablet, ALT        Follow up as above.    (Z23) Need for prophylactic vaccination with tetanus-diphtheria (TD)  Comment:   Plan: TD (ADULT, 7+) PRESERVE FREE              The information in this document, created by the medical scribe for me, accurately reflects the services I personally performed and the decisions made by me. I have reviewed and approved this document for accuracy prior to leaving the patient care area. December 5, 2017 8:02 AM   Mango Gilliland MD

## 2017-12-05 NOTE — NURSING NOTE
"Chief Complaint   Patient presents with     Lipids     Hypertension       Initial /77 (BP Location: Right arm, Patient Position: Chair, Cuff Size: Adult Regular)  Pulse 76  Temp 98.7  F (37.1  C) (Oral)  Ht 6' 1\" (1.854 m)  Wt 198 lb (89.8 kg)  SpO2 98%  BMI 26.12 kg/m2 Estimated body mass index is 26.12 kg/(m^2) as calculated from the following:    Height as of this encounter: 6' 1\" (1.854 m).    Weight as of this encounter: 198 lb (89.8 kg).  Medication Reconciliation: complete     Leo Caldwell MA      "

## 2018-01-25 DIAGNOSIS — I10 ESSENTIAL HYPERTENSION WITH GOAL BLOOD PRESSURE LESS THAN 140/90: ICD-10-CM

## 2018-01-25 DIAGNOSIS — E78.5 HYPERLIPIDEMIA LDL GOAL <130: ICD-10-CM

## 2018-01-25 RX ORDER — LISINOPRIL 10 MG/1
TABLET ORAL
Qty: 90 TABLET | Refills: 0 | Status: SHIPPED | OUTPATIENT
Start: 2018-01-25 | End: 2018-09-07

## 2018-01-25 RX ORDER — SIMVASTATIN 20 MG
TABLET ORAL
Qty: 90 TABLET | Refills: 0 | Status: SHIPPED | OUTPATIENT
Start: 2018-01-25 | End: 2018-06-11

## 2018-01-25 NOTE — TELEPHONE ENCOUNTER
"Requested Prescriptions   Pending Prescriptions Disp Refills     lisinopril (PRINIVIL/ZESTRIL) 10 MG tablet [Pharmacy Med Name: LISINOPRIL 10 MG TABLET]  Last Written Prescription Date:  12/05/17  Last Fill Quantity: 90,  # refills: 0   Last Office Visit with [facility], [facility] or [facility] prescribing provider:  12/05/17   Future Office Visit:    90 tablet 0     Sig: TAKE 1 TABLET (10 MG) BY MOUTH DAILY FOR BLOOD PRESSURE.    ACE Inhibitors (Including Combos) Protocol Passed    1/25/2018  2:34 PM       Passed - Blood pressure under 140/90    BP Readings from Last 3 Encounters:   12/05/17 130/62   07/31/17 108/64   05/23/17 138/78                Passed - Recent or future visit with authorizing provider's specialty    Patient had office visit in the last year or has a visit in the next 30 days with authorizing provider.  See \"Patient Info\" tab in inbasket, or \"Choose Columns\" in Meds & Orders section of the refill encounter.            Passed - Patient is age 18 or older       Passed - Normal serum creatinine on file in past 12 months    Recent Labs   Lab Test  12/05/17   0831   CR  1.08            Passed - Normal serum potassium on file in past 12 months    Recent Labs   Lab Test  12/05/17   0831   POTASSIUM  4.7             simvastatin (ZOCOR) 20 MG tablet [Pharmacy Med Name: SIMVASTATIN 20 MG TABLET]  Last Written Prescription Date:  12/05/17  Last Fill Quantity: 90,  # refills: 0   Last Office Visit with [facility], [facility] or [facility] prescribing provider:  12/05/17   Future Office Visit:    90 tablet 0     Sig: TAKE 1 TABLET (20 MG) BY MOUTH EVERY EVENING FOR CHOLESTEROL.    Statins Protocol Passed    1/25/2018  2:34 PM       Passed - LDL on file in past 12 months    Recent Labs   Lab Test  12/05/17   0831   LDL  56            Passed - No abnormal creatine kinase in past 12 months    No lab results found.         Passed - Recent or future visit with authorizing provider    Patient had office visit in the last year or has a visit in " "the next 30 days with authorizing provider.  See \"Patient Info\" tab in inbasket, or \"Choose Columns\" in Meds & Orders section of the refill encounter.            Passed - Patient is age 18 or older          "

## 2018-02-14 ENCOUNTER — TELEPHONE (OUTPATIENT)
Dept: DERMATOLOGY | Facility: CLINIC | Age: 73
End: 2018-02-14

## 2018-02-14 NOTE — TELEPHONE ENCOUNTER
----- Message from Kena Alcazar LPN sent at 4/14/2017  9:46 AM CDT -----  Please call to schedule annual skin exam in 4/2017

## 2018-02-14 NOTE — TELEPHONE ENCOUNTER
Left message for patient to call [facility] in [place] back at [phone] to schedule for yearly skin exam    Blanche Westfall LPN

## 2018-02-16 NOTE — TELEPHONE ENCOUNTER
Attempted to contact pt.  No answer.  Message left on cell voicemail to return call to [facility] at [phone].  Calling to assist with scheduling an appt with Dermatology. (pt last seen by Dr. Blackburn 4/14/17).    Yesica Story LPN

## 2018-04-19 ENCOUNTER — OFFICE VISIT (OUTPATIENT)
Dept: DERMATOLOGY | Facility: CLINIC | Age: 73
End: 2018-04-19
Payer: COMMERCIAL

## 2018-04-19 DIAGNOSIS — L81.4 SOLAR LENTIGINOSIS: ICD-10-CM

## 2018-04-19 DIAGNOSIS — L57.0 AK (ACTINIC KERATOSIS): Primary | ICD-10-CM

## 2018-04-19 DIAGNOSIS — L82.1 SEBORRHEIC KERATOSIS: ICD-10-CM

## 2018-04-19 PROCEDURE — 17000 DESTRUCT PREMALG LESION: CPT | Performed by: DERMATOLOGY

## 2018-04-19 PROCEDURE — 99213 OFFICE O/P EST LOW 20 MIN: CPT | Mod: 25 | Performed by: DERMATOLOGY

## 2018-04-19 ASSESSMENT — PAIN SCALES - GENERAL: PAINLEVEL: NO PAIN (0)

## 2018-04-19 NOTE — PROGRESS NOTES
[facility] Dermatology Note      Dermatology Problem List:  1. Family history of melanoma    Last FBSE: 4/14/17    Encounter Date: Apr 19, 2018    CC:  Chief Complaint   Patient presents with     RECHECK     skin check spot on forehead          History of Present Illness:  Mr. Steve Herring is a 72 year old male who presents as a referral from Dr. Gilliland for lesion on the upper lip. The patient was last seen 4/14/2017 when the patient was treated with cryo for AKs. Today, the patient reports a pruritic spot on the forehead.     The patient reports no other lesions of concern.    Past Medical History:   Patient Active Problem List   Diagnosis     Hyperlipidemia LDL goal <130     History of adenomatous polyp of colon     Erectile dysfunction     Essential hypertension with goal blood pressure less than 140/90     Advance care planning     Degenerative arthritis of right knee     Degenerative tear of lateral meniscus of right knee     Degenerative tear of medial meniscus of right knee     Dupuytren's contracture of right hand     Hypopotassemia     Past Medical History:   Diagnosis Date     Arthritis 2010     Colon polyps 2003    colonoscopy due Jan 2012     Dupuytren's contracture ~2000    RT>LT     Erectile dysfunction      Essential hypertension, benign 2003     Hyperlipidemia LDL goal <130      MMT (medial meniscus tear) 2004    RT     Past Surgical History:   Procedure Laterality Date     CATARACT IOL, RT/LT  12/18/08    RT     CATARACT IOL, RT/LT  11/13/13    LT, Dr. Carvajal     COLONOSCOPY       HC PNEUMATIC RETINOPEXY  3/17/05    Dr. Steve Hebert     HC REMOVE TONSILS/ADENOIDS,12+ Y/O         Social History:  The patient worked as a  and is not retired.     Family History:  There is a family history of melanoma in the patient's son.  Kept in chart for convenience.       Medications:  Current Outpatient Prescriptions   Medication Sig Dispense Refill     ASPIRIN 81 MG PO TABS 1  tablet daily. *.        IBUPROFEN 200 MG PO TABS 2-3 tabs every 4 hours (ave 3 times per week)       sildenafil (REVATIO/VIAGRA) 20 MG tablet Take 1-5 tablets ( mg) by mouth daily as needed , as directed, 1-3 hours before intimacy. Maximum 1 dose per 24 hours. Never use with nitroglycerin, terazosin or doxazosin. 50 tablet 1     lisinopril (PRINIVIL/ZESTRIL) 10 MG tablet TAKE 1 TABLET (10 MG) BY MOUTH DAILY FOR BLOOD PRESSURE. 90 tablet 0     simvastatin (ZOCOR) 20 MG tablet TAKE 1 TABLET (20 MG) BY MOUTH EVERY EVENING FOR CHOLESTEROL. 90 tablet 0     Allergies   Allergen Reactions     Amoxicillin Hives     Also angioedema     Review of Systems:  -Skin: As per HPI.  -Const: no fevers or chills.    Physical exam:  Vitals: There were no vitals taken for this visit.  GEN: This is a well-nourished, well developed male in no acute distress  SKIN: Total skin excluding the undergarment areas was performed. The exam included the head/face, neck, both arms, chest, back, abdomen, both legs, digits and/or nails. Buttocks examined, scribe prsent  -There are erythematous macules with overyling adherent scale on the forehead  - There are waxy stuck on tan to brown papules on the trunk and extremities.  - Scattered brown macules on sun exposed areas.    -No other lesions of concern on areas examined.     Impression/Plan:  1. Family history of melanoma in son. No concerns with skin check today.    ABCD's of melanoma were reviewed with patient and handout provided.     Recommend sunscreens SPF #50 or greater, protective clothing and avoidance of tanning beds. Use every 2 hours increased if becoming wet or sweaty   2. Actinic keratosis,     Cryotherapy procedure note: After verbal consent and discussion of risks and benefits including but no limited to dyspigmentation/scar, blister, and pain, 1 were treated with 1-2mm freeze border for 1 cycle with liquid nitrogen. Post cryotherapy instructions were provided.  3. Solar  lentigines     No further intervention required at this time.   4. Seborrheic keratosis, trunk and extremities    No further intervention required at this time.     CC Dr. Bennie Gilliland on close of this encounter.  Follow-up in 1 year, earlier for new or changing lesions.       Staff Involved:  Scribe/Staff    Scribe Disclosure:   I, Danielleantione Muhammad, am serving as a scribe to document services personally performed by Dr. Judy Penny, based on data collection and the provider's statements to me.     Provider Disclosure:   The documentation recorded by the scribe accurately reflects the services I personally performed and the decisions made by me.    Judy Penny MD    Department of Dermatology  [facility]: Phone: [phone], Fax:[phone]  [facility]: Phone: [phone], Fax: [phone]

## 2018-04-19 NOTE — NURSING NOTE
"Steve Herring's goals for this visit include:   Chief Complaint   Patient presents with     RECHECK     skin check spot on forehead        He requests these members of his care team be copied on today's visit information: yes    PCP: Mango Gilliland    Referring Provider:  No referring provider defined for this encounter.    Chief Complaint   Patient presents with     RECHECK     skin check spot on forehead        Initial There were no vitals taken for this visit. Estimated body mass index is 26.12 kg/(m^2) as calculated from the following:    Height as of 12/5/17: 1.854 m (6' 1\").    Weight as of 12/5/17: 89.8 kg (198 lb).  Medication Reconciliation: complete    Do you need any medication refills at today's visit? No     Amorrae JAKI Gruber      "

## 2018-04-19 NOTE — LETTER
4/19/2018         RE: Steve Herring  [address] [address]        Dear Colleague,    Thank you for referring your patient, Steve Herring, to the [facility]. Please see a copy of my visit note below.    [facility] Dermatology Note      Dermatology Problem List:  1. Family history of melanoma    Last FBSE: 4/14/17    Encounter Date: Apr 19, 2018    CC:  Chief Complaint   Patient presents with     RECHECK     skin check spot on forehead          History of Present Illness:  Mr. Steve Herring is a 72 year old male who presents as a referral from Dr. Gilliland for lesion on the upper lip. The patient was last seen 4/14/2017 when the patient was treated with cryo for AKs. Today, the patient reports a pruritic spot on the forehead.     The patient reports no other lesions of concern.    Past Medical History:   Patient Active Problem List   Diagnosis     Hyperlipidemia LDL goal <130     History of adenomatous polyp of colon     Erectile dysfunction     Essential hypertension with goal blood pressure less than 140/90     Advance care planning     Degenerative arthritis of right knee     Degenerative tear of lateral meniscus of right knee     Degenerative tear of medial meniscus of right knee     Dupuytren's contracture of right hand     Hypopotassemia     Past Medical History:   Diagnosis Date     Arthritis 2010     Colon polyps 2003    colonoscopy due Jan 2012     Dupuytren's contracture ~2000    RT>LT     Erectile dysfunction      Essential hypertension, benign 2003     Hyperlipidemia LDL goal <130      MMT (medial meniscus tear) 2004    RT     Past Surgical History:   Procedure Laterality Date     CATARACT IOL, RT/LT  12/18/08    RT     CATARACT IOL, RT/LT  11/13/13    LT, Dr. Carvajal     COLONOSCOPY       HC PNEUMATIC RETINOPEXY  3/17/05    Dr. Steve Hebert     HC REMOVE TONSILS/ADENOIDS,12+ Y/O         Social History:  The patient worked as a [employer]   and is not retired.     Family History:  There is a family history of melanoma in the patient's son.  Kept in chart for convenience.       Medications:  Current Outpatient Prescriptions   Medication Sig Dispense Refill     ASPIRIN 81 MG PO TABS 1 tablet daily. *.        IBUPROFEN 200 MG PO TABS 2-3 tabs every 4 hours (ave 3 times per week)       sildenafil (REVATIO/VIAGRA) 20 MG tablet Take 1-5 tablets ( mg) by mouth daily as needed , as directed, 1-3 hours before intimacy. Maximum 1 dose per 24 hours. Never use with nitroglycerin, terazosin or doxazosin. 50 tablet 1     lisinopril (PRINIVIL/ZESTRIL) 10 MG tablet TAKE 1 TABLET (10 MG) BY MOUTH DAILY FOR BLOOD PRESSURE. 90 tablet 0     simvastatin (ZOCOR) 20 MG tablet TAKE 1 TABLET (20 MG) BY MOUTH EVERY EVENING FOR CHOLESTEROL. 90 tablet 0     Allergies   Allergen Reactions     Amoxicillin Hives     Also angioedema     Review of Systems:  -Skin: As per HPI.  -Const: no fevers or chills.    Physical exam:  Vitals: There were no vitals taken for this visit.  GEN: This is a well-nourished, well developed male in no acute distress  SKIN: Total skin excluding the undergarment areas was performed. The exam included the head/face, neck, both arms, chest, back, abdomen, both legs, digits and/or nails. Buttocks examined, scribe prsent  -There are erythematous macules with overyling adherent scale on the forehead  - There are waxy stuck on tan to brown papules on the trunk and extremities.  - Scattered brown macules on sun exposed areas.    -No other lesions of concern on areas examined.     Impression/Plan:  1. Family history of melanoma in son. No concerns with skin check today.    ABCD's of melanoma were reviewed with patient and handout provided.     Recommend sunscreens SPF #50 or greater, protective clothing and avoidance of tanning beds. Use every 2 hours increased if becoming wet or sweaty   2. Actinic keratosis,     Cryotherapy procedure note: After  verbal consent and discussion of risks and benefits including but no limited to dyspigmentation/scar, blister, and pain, 1 were treated with 1-2mm freeze border for 1 cycle with liquid nitrogen. Post cryotherapy instructions were provided.  3. Solar lentigines     No further intervention required at this time.   4. Seborrheic keratosis, trunk and extremities    No further intervention required at this time.     CC Dr. Bennie Gilliland on close of this encounter.  Follow-up in 1 year, earlier for new or changing lesions.       Staff Involved:  Scribe/Staff    Scribe Disclosure:   I, Danielle Muhammad, am serving as a scribe to document services personally performed by Dr. Judy Penny, based on data collection and the provider's statements to me.     Provider Disclosure:   The documentation recorded by the scribe accurately reflects the services I personally performed and the decisions made by me.    Judy Penny MD    Department of Dermatology  [facility]: Phone: [phone], Fax:[phone]  [facility]: Phone: [phone], Fax: [phone]        Again, thank you for allowing me to participate in the care of your patient.        Sincerely,        Judy Penny MD

## 2018-04-19 NOTE — PATIENT INSTRUCTIONS
CRYOTHERAPY    What is it?    Use of a very cold liquid, such as liquid nitrogen, to freeze and destroy abnormal skin cells that need to be removed    What should I expect?    Tenderness and redness    A small blister that might grow and fill with dark purple blood. There may be crusting.    More than one treatment may be needed if the lesions do not go away.    How do I care for the treated area?    Gently wash the area with your hands when bathing.    Use a thin layer of Vaselin to help with healing. You may use a Band-Aid.     The area should heal within 7-10 days.    Do not use an antibiotic or Neosporin ointment.     You may take acetaminophen (Tylenol) for pain.     Call your Doctor if you have:    Severe pain    Signs of infection (warmth, redness, cloudy yellow drainage, and or a bad smell)    Questions or concerns    Contact infromation:  [facility] [phone]  [facility] [phone]

## 2018-04-19 NOTE — MR AVS SNAPSHOT
After Visit Summary   4/19/2018    Steve Herring    MRN: [id number]           Patient Information     Date Of Birth          1945        Visit Information        Provider Department      4/19/2018 12:45 PM Judy Penny MD [facility]        Today's Diagnoses     AK (actinic keratosis)    -  1    Solar lentiginosis        Seborrheic keratosis          Care Instructions    CRYOTHERAPY    What is it?    Use of a very cold liquid, such as liquid nitrogen, to freeze and destroy abnormal skin cells that need to be removed    What should I expect?    Tenderness and redness    A small blister that might grow and fill with dark purple blood. There may be crusting.    More than one treatment may be needed if the lesions do not go away.    How do I care for the treated area?    Gently wash the area with your hands when bathing.    Use a thin layer of Vaselin to help with healing. You may use a Band-Aid.     The area should heal within 7-10 days.    Do not use an antibiotic or Neosporin ointment.     You may take acetaminophen (Tylenol) for pain.     Call your Doctor if you have:    Severe pain    Signs of infection (warmth, redness, cloudy yellow drainage, and or a bad smell)    Questions or concerns    Contact infromation:  [facility] [phone]  [facility] [phone]            Follow-ups after your visit        Who to contact     If you have questions or need follow up information about today's clinic visit or your schedule please contact [facility] directly at [phone].  Normal or non-critical lab and imaging results will be communicated to you by MyChart, letter or phone within 4 business days after the clinic has received the results. If you do not hear from us within 7 days, please contact the clinic through MyChart or phone. If you have a critical or abnormal lab result, we will notify you  by phone as soon as possible.  Submit refill requests through [employer]page or call your pharmacy and they will forward the refill request to us. Please allow 3 business days for your refill to be completed.          Additional Information About Your Visit        Swiftpage Information     [employer]page gives you secure access to your electronic health record. If you see a primary care provider, you can also send messages to your care team and make appointments. If you have questions, please call your primary care clinic.  If you do not have a primary care provider, please call [phone] and they will assist you.      Swiftpage is an electronic gateway that provides easy, online access to your medical records. With [employer]page, you can request a clinic appointment, read your test results, renew a prescription or communicate with your care team.     To access your existing account, please contact your [facility] or call [phone] for assistance.        Care EveryWhere ID     This is your Care EveryWhere ID. This could be used by other organizations to access your [place] medical records  [id number]         Blood Pressure from Last 3 Encounters:   12/05/17 130/62   07/31/17 108/64   05/23/17 138/78    Weight from Last 3 Encounters:   12/05/17 89.8 kg (198 lb)   07/31/17 89.4 kg (197 lb)   05/23/17 93 kg (205 lb)              We Performed the Following     DESTRUCT PREMALIGNANT LESION, FIRST        Primary Care Provider Office Phone # Fax #    Mango Gilliland [phone]       [address]  [facility] [address]        Equal Access to Services     [facility]: Hadii aad ku hadasho Soomaali, waaxda luqadaha, qaybta kaalmada adeegyada, tanisha varghese . So [facility] [phone].    ATENCIÓN: Si habla español, tiene a hernandez disposición servicios gratuitos de asistencia lingüística. Llame al [phone].    We comply with applicable federal civil rights laws  and [place] laws. We do not discriminate on the basis of race, color, national origin, age, disability, sex, sexual orientation, or gender identity.            Thank you!     Thank you for choosing [facility]  for your care. Our goal is always to provide you with excellent care. Hearing back from our patients is one way we can continue to improve our services. Please take a few minutes to complete the written survey that you may receive in the mail after your visit with us. Thank you!             Your Updated Medication List - Protect others around you: Learn how to safely use, store and throw away your medicines at www.disposemymeds.org.          This list is accurate as of 4/19/18  1:11 PM.  Always use your most recent med list.                   Brand Name Dispense Instructions for use Diagnosis    aspirin 81 MG tablet      1 tablet daily. *.        ibuprofen 200 MG tablet    ADVIL/MOTRIN     2-3 tabs every 4 hours (ave 3 times per week)        lisinopril 10 MG tablet    PRINIVIL/ZESTRIL    90 tablet    TAKE 1 TABLET (10 MG) BY MOUTH DAILY FOR BLOOD PRESSURE.    Essential hypertension with goal blood pressure less than 140/90       sildenafil 20 MG tablet    REVATIO    50 tablet    Take 1-5 tablets ( mg) by mouth daily as needed , as directed, 1-3 hours before intimacy. Maximum 1 dose per 24 hours. Never use with nitroglycerin, terazosin or doxazosin.    Erectile dysfunction, unspecified erectile dysfunction type       simvastatin 20 MG tablet    ZOCOR    90 tablet    TAKE 1 TABLET (20 MG) BY MOUTH EVERY EVENING FOR CHOLESTEROL.    Hyperlipidemia LDL goal <130

## 2018-06-11 ENCOUNTER — OFFICE VISIT (OUTPATIENT)
Dept: FAMILY MEDICINE | Facility: CLINIC | Age: 73
End: 2018-06-11
Payer: COMMERCIAL

## 2018-06-11 VITALS
OXYGEN SATURATION: 98 % | HEART RATE: 46 BPM | TEMPERATURE: 98.4 F | WEIGHT: 191 LBS | SYSTOLIC BLOOD PRESSURE: 130 MMHG | DIASTOLIC BLOOD PRESSURE: 60 MMHG | HEIGHT: 73 IN | BODY MASS INDEX: 25.31 KG/M2

## 2018-06-11 DIAGNOSIS — E78.5 HYPERLIPIDEMIA LDL GOAL <130: ICD-10-CM

## 2018-06-11 DIAGNOSIS — Z00.00 ENCOUNTER FOR ROUTINE ADULT HEALTH EXAMINATION WITHOUT ABNORMAL FINDINGS: Primary | ICD-10-CM

## 2018-06-11 DIAGNOSIS — Z23 NEED FOR SHINGLES VACCINE: ICD-10-CM

## 2018-06-11 DIAGNOSIS — I10 ESSENTIAL HYPERTENSION WITH GOAL BLOOD PRESSURE LESS THAN 140/90: ICD-10-CM

## 2018-06-11 PROCEDURE — 99397 PER PM REEVAL EST PAT 65+ YR: CPT | Performed by: FAMILY MEDICINE

## 2018-06-11 RX ORDER — SIMVASTATIN 20 MG
20 TABLET ORAL EVERY EVENING
Qty: 90 TABLET | Refills: 1 | Status: SHIPPED | OUTPATIENT
Start: 2018-06-11 | End: 2019-01-15

## 2018-06-11 ASSESSMENT — PAIN SCALES - GENERAL: PAINLEVEL: NO PAIN (0)

## 2018-06-11 NOTE — PROGRESS NOTES
SUBJECTIVE:   Steve Herring is a 72 year old male who presents for Preventive Visit.    Are you in the first 12 months of your Medicare Part B coverage?  No    Healthy Habits:    Do you get at least three servings of calcium containing foods daily (dairy, green leafy vegetables, etc.)? yes    Amount of exercise or daily activities, outside of work: 4 day(s) per week    Problems taking medications regularly No    Medication side effects: No    Have you had an eye exam in the past two years? yes    Do you see a dentist twice per year? yes    Do you have sleep apnea, excessive snoring or daytime drowsiness?yes      Ability to successfully perform activities of daily living: Yes, no assistance needed    Home safety:  none identified     Hearing impairment: No    Fall risk:  Fallen 2 or more times in the past year?: No  Any fall with injury in the past year?: No    COGNITIVE SCREEN  1) Repeat 3 items (Banana, Sunrise, Chair)  YES  2) Clock draw: NORMAL  3) 3 item recall: Recalls 3 objects  Results: 3 items recalled: COGNITIVE IMPAIRMENT LESS LIKELY  Mini-CogTM Copyright EBONY Cormier. Licensed by the author for use in [facility]; reprinted with permission (noble@[facility]). All rights reserved.      Social History   Substance Use Topics     Smoking status: Never Smoker     Smokeless tobacco: Never Used     Alcohol use Yes      Comment: seldom       If you drink alcohol do you typically have >3 drinks per day or >7 drinks per week? No                        Today's PHQ-2 Score:   PHQ-2 ( 1999 Pf[employer]) 6/11/2018 5/23/2017   Q1: Little interest or pleasure in doing things 0 0   Q2: Feeling down, depressed or hopeless 0 0   PHQ-2 Score 0 0   Q1: Little interest or pleasure in doing things - -   Q2: Feeling down, depressed or hopeless - -   PHQ-2 Score - -     Do you feel safe in your environment - YES    Do you have a Health Care Directive?: No: Advance care planning reviewed with patient; information given to patient  "to review.    Current providers sharing in care for this patient include:   Patient Care Team:  Mango Gilliland MD as PCP - General    The following health maintenance items are reviewed in Epic and correct as of today:  Health Maintenance   Topic Date Due     FALL RISK ASSESSMENT  05/23/2018     LIPID MONITORING Q2 YEARS  12/05/2019     ADVANCE DIRECTIVE PLANNING Q5 YRS  05/03/2021     COLONOSCOPY Q5 YR  08/14/2022     TETANUS IMMUNIZATION (SYSTEM ASSIGNED)  12/05/2027     PNEUMOCOCCAL  Completed     INFLUENZA VACCINE  Completed     AORTIC ANEURYSM SCREENING (SYSTEM ASSIGNED)  Completed     HEPATITIS C SCREENING  Completed     ROS:  CONSTITUTIONAL: NEGATIVE for fever, chills, change in weight  ENT/MOUTH: NEGATIVE for ear, mouth and throat problems  RESP: NEGATIVE for significant cough or SOB  CV: NEGATIVE for chest pain, palpitations or peripheral edema  ROS otherwise negative    This document serves as a record of the services and decisions personally performed and made by Dr. Gilliland. It was created on his behalf by Ursula Francis, a trained medical scribe. The creation of this document is based the provider's statements to the medical scribe.  Ursula Francis June 11, 2018 9:32 AM     OBJECTIVE:   /60  Pulse (!) 46  Temp 98.4  F (36.9  C) (Oral)  Ht 1.854 m (6' 1\")  Wt 86.6 kg (191 lb)  SpO2 98%  BMI 25.2 kg/m2 Estimated body mass index is 25.2 kg/(m^2) as calculated from the following:    Height as of this encounter: 1.854 m (6' 1\").    Weight as of this encounter: 86.6 kg (191 lb).  EXAM:   GENERAL: healthy, alert and no distress  EYES: Eyes grossly normal to inspection, PERRL and conjunctivae and sclerae normal  HENT: ear canals and TM's normal, nose and mouth without ulcers or lesions  NECK: no adenopathy, no asymmetry, masses, or scars and thyroid normal to palpation  RESP: lungs clear to auscultation - no rales, rhonchi or wheezes  CV: regular rate and rhythm, normal S1 S2, no S3 or S4, no murmur, " click or rub, no peripheral edema and peripheral pulses strong  ABDOMEN: soft, nontender, no hepatosplenomegaly, no masses and bowel sounds normal   (male): normal male genitalia without lesions or urethral discharge, no hernia  MS: no gross musculoskeletal defects noted, no edema  SKIN: no suspicious lesions or rashes  NEURO: Normal strength and tone, sensory exam grossly normal, mentation intact, cranial nerves 2-12 intact and DTR's normal and symmetric  PSYCH: mentation appears normal, affect normal/bright    ASSESSMENT / PLAN:   (Z00.00) Encounter for routine adult health examination without abnormal findings  (primary encounter diagnosis)  Comment: Negative screening exam; up-to-date on preventive services.   Plan: zoster vaccine recombinant adjuvanted         (SHINGRIX) injection        Return in about 6 months (around 12/11/2018) for cholesterol, blood pressure check.     (I10) Essential hypertension with goal blood pressure less than 140/90  Comment: controlled  Plan: Basic metabolic panel        Return in about 6 months (around 12/11/2018) for cholesterol, blood pressure check.     (E78.5) Hyperlipidemia LDL goal <130  Comment: historically at goal. Non-fasting today, so will schedule lab check at later date.  Plan: ALT, Lipid panel reflex to direct LDL         Non-fasting, simvastatin (ZOCOR) 20 MG tablet        Monitor periodically.     (Z23) Need for shingles vaccine  Comment:   Plan: zoster vaccine recombinant adjuvanted         (SHINGRIX) injection        VIS provided. Follow up in 2-6 months for second vaccination.       End of Life Planning:  Patient currently has an advanced directive: No.  I have verified the patient's ablity to prepare an advanced directive/make health care decisions.  Literature was provided to assist patient in preparing an advanced directive.    COUNSELING:  Reviewed preventive health counseling, as reflected in patient instructions  Special attention given to:        "Immunizations    Vaccinated for: Zoster      Estimated body mass index is 25.2 kg/(m^2) as calculated from the following:    Height as of this encounter: 1.854 m (6' 1\").    Weight as of this encounter: 86.6 kg (191 lb).     reports that he has never smoked. He has never used smokeless tobacco.    Appropriate preventive services were discussed with this patient, including applicable screening as appropriate for cardiovascular disease, diabetes, osteopenia/osteoporosis, and glaucoma.  As appropriate for age/gender, discussed screening for colorectal cancer, prostate cancer, breast cancer, and cervical cancer. Checklist reviewing preventive services available has been given to the patient.    Reviewed patients plan of care and provided an AVS. The Basic Care Plan (routine screening as documented in Health Maintenance) for Steve meets the Care Plan requirement. This Care Plan has been established and reviewed with the Patient.    Counseling Resources:  ATP IV Guidelines  Pooled Cohorts Equation Calculator  Breast Cancer Risk Calculator  FRAX Risk Assessment  ICSI Preventive Guidelines  Dietary Guidelines for Americans, 2010  USDA's MyPlate  ASA Prophylaxis  Lung CA Screening    The information in this document, created by the medical scribe for me, accurately reflects the services I personally performed and the decisions made by me. I have reviewed and approved this document for accuracy prior to leaving the patient care area. June 11, 2018 9:32 AM     Mango Gilliland MD  [facility]  "

## 2018-06-11 NOTE — PATIENT INSTRUCTIONS
Preventive Health Recommendations:       Male Ages 65 and over    Yearly exam:             See your health care provider every year in order to  o   Review health changes.   o   Discuss preventive care.    o   Review your medicines if your doctor has prescribed any.    Talk with your health care provider about whether you should have a test to screen for prostate cancer (PSA).    Every 3 years, have a diabetes test (fasting glucose). If you are at risk for diabetes, you should have this test more often.    Every 5 years, have a cholesterol test. Have this test more often if you are at risk for high cholesterol or heart disease.     Every 10 years, have a colonoscopy. Or, have a yearly FIT test (stool test). These exams will check for colon cancer.    Talk to with your health care provider about screening for Abdominal Aortic Aneurysm if you have a family history of AAA or have a history of smoking.  Shots:     Get a flu shot each year.     Get a tetanus shot every 10 years.     Talk to your doctor about your pneumonia vaccines. There are now two you should receive - Pneumovax (PPSV 23) and Prevnar (PCV 13).    Talk to your doctor about a shingles vaccine.     Talk to your doctor about the hepatitis B vaccine.  Nutrition:     Eat at least 5 servings of fruits and vegetables each day.     Eat whole-grain bread, whole-wheat pasta and brown rice instead of white grains and rice.     Talk to your doctor about Calcium and Vitamin D.   Lifestyle    Exercise for at least 150 minutes a week (30 minutes a day, 5 days a week). This will help you control your weight and prevent disease.     Limit alcohol to one drink per day.     No smoking.     Wear sunscreen to prevent skin cancer.     See your dentist every six months for an exam and cleaning.     See your eye doctor every 1 to 2 years to screen for conditions such as glaucoma, macular degeneration and cataracts.    At [facility], we strive to deliver  an exceptional experience to you, every time we see you.  If you receive a survey in the mail, please send us back your thoughts. We really do value your feedback.    Based on your medical history, these are the current health maintenance/preventive care services that you are due for (some may have been done at this visit.)  Health Maintenance Due   Topic Date Due     FALL RISK ASSESSMENT  05/23/2018       Suggested websites for health information:  Www.Photobucket.org : Up to date and easily searchable information on multiple topics.  Www.medlineplus.gov : medication info, interactive tutorials, watch real surgeries online  Www.familydoctor.org : good info from the Academy of Family Physicians  Www.cdc.gov : public health info, travel advisories, epidemics (H1N1)  Www.aap.org : children's health info, normal development, vaccinations  Www.health.[facility].us : [place] dept of health, public health issues in [place], N1N1    Your care team:                            Family Medicine Internal Medicine   MD Aurelio Schaefer MD Shantel Branch-Fleming, MD Katya Georgiev PA-C Megan Hill, APRN CNP    Nithin Peacock MD Pediatrics   Bret Camarillo, PATRIPP Roque, MD Shannan Hernandez APRN CNP   MD Renee Uriarte MD Deborah Mielke, MD Kim Thein, APRN CNP      Clinic hours: Monday - Thursday 7 am-7 pm; Fridays 7 am-5 pm.   Urgent care: Monday - Friday 11 am-9 pm; Saturday and Sunday 9 am-5 pm.  Pharmacy : Monday -Thursday 8 am-8 pm; Friday 8 am-6 pm; Saturday and Sunday 9 am-5 pm.     Clinic: [phone]   Pharmacy: [phone]

## 2018-06-11 NOTE — MR AVS SNAPSHOT
After Visit Summary   6/11/2018    Steve Herring    MRN: [id number]           Patient Information     Date Of Birth          1945        Visit Information        Provider Department      6/11/2018 9:00 AM Mango Gilliland MD [facility]        Today's Diagnoses     Encounter for routine adult health examination without abnormal findings    -  1    Essential hypertension with goal blood pressure less than 140/90        Hyperlipidemia LDL goal <130        Need for shingles vaccine          Care Instructions      Preventive Health Recommendations:       Male Ages 65 and over    Yearly exam:             See your health care provider every year in order to  o   Review health changes.   o   Discuss preventive care.    o   Review your medicines if your doctor has prescribed any.    Talk with your health care provider about whether you should have a test to screen for prostate cancer (PSA).    Every 3 years, have a diabetes test (fasting glucose). If you are at risk for diabetes, you should have this test more often.    Every 5 years, have a cholesterol test. Have this test more often if you are at risk for high cholesterol or heart disease.     Every 10 years, have a colonoscopy. Or, have a yearly FIT test (stool test). These exams will check for colon cancer.    Talk to with your health care provider about screening for Abdominal Aortic Aneurysm if you have a family history of AAA or have a history of smoking.  Shots:     Get a flu shot each year.     Get a tetanus shot every 10 years.     Talk to your doctor about your pneumonia vaccines. There are now two you should receive - Pneumovax (PPSV 23) and Prevnar (PCV 13).    Talk to your doctor about a shingles vaccine.     Talk to your doctor about the hepatitis B vaccine.  Nutrition:     Eat at least 5 servings of fruits and vegetables each day.     Eat whole-grain bread, whole-wheat pasta and brown rice instead of white grains and  rice.     Talk to your doctor about Calcium and Vitamin D.   Lifestyle    Exercise for at least 150 minutes a week (30 minutes a day, 5 days a week). This will help you control your weight and prevent disease.     Limit alcohol to one drink per day.     No smoking.     Wear sunscreen to prevent skin cancer.     See your dentist every six months for an exam and cleaning.     See your eye doctor every 1 to 2 years to screen for conditions such as glaucoma, macular degeneration and cataracts.    At [facility], we strive to deliver an exceptional experience to you, every time we see you.  If you receive a survey in the mail, please send us back your thoughts. We really do value your feedback.    Based on your medical history, these are the current health maintenance/preventive care services that you are due for (some may have been done at this visit.)  Health Maintenance Due   Topic Date Due     FALL RISK ASSESSMENT  05/23/2018       Suggested websites for health information:  Www.[facility].org : Up to date and easily searchable information on multiple topics.  Www.medlineplus.gov : medication info, interactive tutorials, watch real surgeries online  Www.familydoctor.org : good info from the Academy of Family Physicians  Www.cdc.gov : public health info, travel advisories, epidemics (H1N1)  Www.aap.org : children's health info, normal development, vaccinations  Www.health.[facility].[place].us : [place] dept of health, public health issues in [place], N1N1    Your care team:                            Family Medicine Internal Medicine   MD Aurelio Schaefer MD Shantel Branch-Fleming, MD Katya Georgiev PA-C Megan Hill, RASHARD Peacock MD Pediatrics   RIN Ordoñez, MD Shannan Hernandez APRN CNP   MD Renee Uirarte MD Deborah Mielke, MD Kim Thein, APRN [facility]      Clinic hours: Monday - Thursday 7 am-7 pm; Fridays 7 am-5 pm.   Urgent care:  Monday - Friday 11 am-9 pm; Saturday and Sunday 9 am-5 pm.  Pharmacy : Monday -Thursday 8 am-8 pm; Friday 8 am-6 pm; Saturday and Sunday 9 am-5 pm.     Clinic: [phone]   Pharmacy: [phone]              Follow-ups after your visit        Follow-up notes from your care team     Return in about 6 months (around 12/11/2018) for cholesterol, blood pressure check.      Future tests that were ordered for you today     Open Future Orders        Priority Expected Expires Ordered    ALT Routine  12/11/2018 6/11/2018    Lipid panel reflex to direct LDL Non-fasting Routine  12/11/2018 6/11/2018    Basic metabolic panel Routine  12/11/2018 6/11/2018            Who to contact     If you have questions or need follow up information about today's clinic visit or your schedule please contact [facility] directly at [phone].  Normal or non-critical lab and imaging results will be communicated to you by ADPhart, letter or phone within 4 business days after the clinic has received the results. If you do not hear from us within 7 days, please contact the clinic through [employer] or phone. If you have a critical or abnormal lab result, we will notify you by phone as soon as possible.  Submit refill requests through [employer] or call your pharmacy and they will forward the refill request to us. Please allow 3 business days for your refill to be completed.          Additional Information About Your Visit        ADPhart Information     [employer] gives you secure access to your electronic health record. If you see a primary care provider, you can also send messages to your care team and make appointments. If you have questions, please call your primary care clinic.  If you do not have a primary care provider, please call [phone] and they will assist you.        Care EveryWhere ID     This is your Care EveryWhere ID. This could be used by other organizations to access your [facility]  "records  [id number]        Your Vitals Were     Pulse Temperature Height Pulse Oximetry BMI (Body Mass Index)       46 98.4  F (36.9  C) (Oral) 1.854 m (6' 1\") 98% 25.2 kg/m2        Blood Pressure from Last 3 Encounters:   06/11/18 130/60   12/05/17 130/62   07/31/17 108/64    Weight from Last 3 Encounters:   06/11/18 86.6 kg (191 lb)   12/05/17 89.8 kg (198 lb)   07/31/17 89.4 kg (197 lb)                 Today's Medication Changes          These changes are accurate as of 6/11/18  9:44 AM.  If you have any questions, ask your nurse or doctor.               Start taking these medicines.        Dose/Directions    zoster vaccine recombinant adjuvanted injection   Commonly known as:  SHINGRIX   Used for:  Encounter for routine adult health examination without abnormal findings, Need for shingles vaccine   Started by:  Mango Gilliland MD        Dose:  1 each   Inject 0.5 mLs into the muscle once for 1 dose . Repeat in 2-6 months.   Quantity:  0.5 mL   Refills:  1            Where to get your medicines      These medications were sent to [place] [address] [place] [facility], [place] - [address]  [address] [facility] [address]     Phone:  [phone]     simvastatin 20 MG tablet    zoster vaccine recombinant adjuvanted injection                Primary Care Provider Office Phone # Fax #    Mango Gilliland [phone]       [address]  [facility] [address]        Equal Access to Services     [facility] : Hadii dc hermosillo hadasho Soomaali, waaxda luqadaha, qaybta kaalmada adeegyada, waxay idiin hayaan adelucius joseph. So [facility] [phone].    ATENCIÓN: Si habla español, tiene a hernandez disposición servicios gratuitos de asistencia lingüística. Llame al [phone].    We comply with applicable federal civil rights laws and [place] laws. We do not discriminate on the basis of race, color, national origin, age, disability, sex, sexual orientation, or gender identity.            Thank " you!     Thank you for choosing [facility]  for your care. Our goal is always to provide you with excellent care. Hearing back from our patients is one way we can continue to improve our services. Please take a few minutes to complete the written survey that you may receive in the mail after your visit with us. Thank you!             Your Updated Medication List - Protect others around you: Learn how to safely use, store and throw away your medicines at www.disposemymeds.org.          This list is accurate as of 6/11/18  9:44 AM.  Always use your most recent med list.                   Brand Name Dispense Instructions for use Diagnosis    aspirin 81 MG tablet      1 tablet daily. *.        ibuprofen 200 MG tablet    ADVIL/MOTRIN     2-3 tabs every 4 hours (ave 3 times per week)        lisinopril 10 MG tablet    PRINIVIL/ZESTRIL    90 tablet    TAKE 1 TABLET (10 MG) BY MOUTH DAILY FOR BLOOD PRESSURE.    Essential hypertension with goal blood pressure less than 140/90       sildenafil 20 MG tablet    REVATIO    50 tablet    Take 1-5 tablets ( mg) by mouth daily as needed , as directed, 1-3 hours before intimacy. Maximum 1 dose per 24 hours. Never use with nitroglycerin, terazosin or doxazosin.    Erectile dysfunction, unspecified erectile dysfunction type       simvastatin 20 MG tablet    ZOCOR    90 tablet    Take 1 tablet (20 mg) by mouth every evening for cholesterol.    Hyperlipidemia LDL goal <130       zoster vaccine recombinant adjuvanted injection    SHINGRIX    0.5 mL    Inject 0.5 mLs into the muscle once for 1 dose . Repeat in 2-6 months.    Encounter for routine adult health examination without abnormal findings, Need for shingles vaccine

## 2018-06-13 DIAGNOSIS — I10 ESSENTIAL HYPERTENSION WITH GOAL BLOOD PRESSURE LESS THAN 140/90: ICD-10-CM

## 2018-06-13 DIAGNOSIS — E78.5 HYPERLIPIDEMIA LDL GOAL <130: ICD-10-CM

## 2018-06-13 LAB
ALT SERPL W P-5'-P-CCNC: 19 U/L (ref 0–70)
ANION GAP SERPL CALCULATED.3IONS-SCNC: 10 MMOL/L (ref 3–14)
BUN SERPL-MCNC: 27 MG/DL (ref 7–30)
CALCIUM SERPL-MCNC: 9 MG/DL (ref 8.5–10.1)
CHLORIDE SERPL-SCNC: 107 MMOL/L (ref 94–109)
CHOLEST SERPL-MCNC: 130 MG/DL
CO2 SERPL-SCNC: 26 MMOL/L (ref 20–32)
CREAT SERPL-MCNC: 1.17 MG/DL (ref 0.66–1.25)
GFR SERPL CREATININE-BSD FRML MDRD: 62 ML/MIN/1.7M2
GLUCOSE SERPL-MCNC: 99 MG/DL (ref 70–99)
HDLC SERPL-MCNC: 49 MG/DL
LDLC SERPL CALC-MCNC: 58 MG/DL
NONHDLC SERPL-MCNC: 81 MG/DL
POTASSIUM SERPL-SCNC: 4.3 MMOL/L (ref 3.4–5.3)
SODIUM SERPL-SCNC: 143 MMOL/L (ref 133–144)
TRIGL SERPL-MCNC: 115 MG/DL

## 2018-06-13 PROCEDURE — 80061 LIPID PANEL: CPT | Performed by: FAMILY MEDICINE

## 2018-06-13 PROCEDURE — 80048 BASIC METABOLIC PNL TOTAL CA: CPT | Performed by: FAMILY MEDICINE

## 2018-06-13 PROCEDURE — 36415 COLL VENOUS BLD VENIPUNCTURE: CPT | Performed by: FAMILY MEDICINE

## 2018-06-13 PROCEDURE — 84460 ALANINE AMINO (ALT) (SGPT): CPT | Performed by: FAMILY MEDICINE

## 2018-07-29 ENCOUNTER — MYC MEDICAL ADVICE (OUTPATIENT)
Dept: FAMILY MEDICINE | Facility: CLINIC | Age: 73
End: 2018-07-29

## 2018-07-29 NOTE — LETTER
2018        Re:  Steve Herring  : 1945  Our MR#: [id number]         To whom it may concern,    I have known Mr. Herring for many years.  I feel that he is at no increased risk for medical complications from skydiving than anyone his age.    Sincerely,          Mango Gilliland MD

## 2018-08-06 NOTE — TELEPHONE ENCOUNTER
Letter will be at the  for patient to pickup today after 1pm.  Caleb Kent,  For [facility]

## 2018-09-07 DIAGNOSIS — I10 ESSENTIAL HYPERTENSION WITH GOAL BLOOD PRESSURE LESS THAN 140/90: ICD-10-CM

## 2018-09-07 NOTE — TELEPHONE ENCOUNTER
Requested Prescriptions   Pending Prescriptions Disp Refills     lisinopril (PRINIVIL/ZESTRIL) 10 MG tablet  Last Written Prescription Date:  1/25/18  Last Fill Quantity: 90,  # refills: 0   Last office visit: 6/11/2018 with prescribing provider:  Darshana   Future Office Visit:     90 tablet 0    There is no refill protocol information for this order

## 2018-09-11 RX ORDER — LISINOPRIL 10 MG/1
TABLET ORAL
Qty: 90 TABLET | Refills: 0 | Status: SHIPPED | OUTPATIENT
Start: 2018-09-11 | End: 2018-12-02

## 2018-09-11 NOTE — TELEPHONE ENCOUNTER
Prescription approved per [facility] Refill Protocol.  Kitty Amaya RN

## 2018-09-11 NOTE — TELEPHONE ENCOUNTER
"Requested Prescriptions   Pending Prescriptions Disp Refills     lisinopril (PRINIVIL/ZESTRIL) 10 MG tablet 90 tablet 0    ACE Inhibitors (Including Combos) Protocol Passed    9/7/2018 10:01 AM       Passed - Blood pressure under 140/90 in past 12 months    BP Readings from Last 3 Encounters:   06/11/18 130/60   12/05/17 130/62   07/31/17 108/64                Passed - Recent (12 mo) or future (30 days) visit within the authorizing provider's specialty    Patient had office visit in the last 12 months or has a visit in the next 30 days with authorizing provider or within the authorizing provider's specialty.  See \"Patient Info\" tab in inbasket, or \"Choose Columns\" in Meds & Orders section of the refill encounter.           Passed - Patient is age 18 or older       Passed - Normal serum creatinine on file in past 12 months    Recent Labs   Lab Test  06/13/18   0847   CR  1.17            Passed - Normal serum potassium on file in past 12 months    Recent Labs   Lab Test  06/13/18   0847   POTASSIUM  4.3               "

## 2018-12-02 DIAGNOSIS — I10 ESSENTIAL HYPERTENSION WITH GOAL BLOOD PRESSURE LESS THAN 140/90: ICD-10-CM

## 2018-12-02 NOTE — TELEPHONE ENCOUNTER
"Requested Prescriptions   Pending Prescriptions Disp Refills     lisinopril (PRINIVIL/ZESTRIL) 10 MG tablet [Pharmacy Med Name: LISINOPRIL 10 MG TABLET] 90 tablet 0    Last Written Prescription Date:  9/11/18  Last Fill Quantity: 90,  # refills: 0   Last Office Visit with [facility], P or [facility] prescribing provider:  6/11/2018     Future Office Visit:      Sig: TAKE 1 TABLET (10 MG) BY MOUTH DAILY FOR BLOOD PRESSURE.    ACE Inhibitors (Including Combos) Protocol Passed    12/2/2018  3:08 PM       Passed - Blood pressure under 140/90 in past 12 months    BP Readings from Last 3 Encounters:   06/11/18 130/60   12/05/17 130/62   07/31/17 108/64                Passed - Recent (12 mo) or future (30 days) visit within the authorizing provider's specialty    Patient had office visit in the last 12 months or has a visit in the next 30 days with authorizing provider or within the authorizing provider's specialty.  See \"Patient Info\" tab in inbasket, or \"Choose Columns\" in Meds & Orders section of the refill encounter.             Passed - Patient is age 18 or older       Passed - Normal serum creatinine on file in past 12 months    Recent Labs   Lab Test  06/13/18   0847   CR  1.17            Passed - Normal serum potassium on file in past 12 months    Recent Labs   Lab Test  06/13/18   0847   POTASSIUM  4.3               "

## 2018-12-04 RX ORDER — LISINOPRIL 10 MG/1
TABLET ORAL
Qty: 90 TABLET | Refills: 1 | Status: SHIPPED | OUTPATIENT
Start: 2018-12-04 | End: 2019-01-15

## 2018-12-05 NOTE — TELEPHONE ENCOUNTER
Prescription approved per [facility] Refill Protocol.      Aaron Quesada RN, BSN

## 2019-01-15 ENCOUNTER — OFFICE VISIT (OUTPATIENT)
Dept: FAMILY MEDICINE | Facility: CLINIC | Age: 74
End: 2019-01-15
Payer: COMMERCIAL

## 2019-01-15 VITALS
HEIGHT: 73 IN | WEIGHT: 196 LBS | OXYGEN SATURATION: 97 % | DIASTOLIC BLOOD PRESSURE: 70 MMHG | HEART RATE: 82 BPM | BODY MASS INDEX: 25.98 KG/M2 | SYSTOLIC BLOOD PRESSURE: 120 MMHG | TEMPERATURE: 97.6 F | RESPIRATION RATE: 16 BRPM

## 2019-01-15 DIAGNOSIS — E78.5 HYPERLIPIDEMIA LDL GOAL <130: Primary | ICD-10-CM

## 2019-01-15 DIAGNOSIS — J30.0 VASOMOTOR RHINITIS: ICD-10-CM

## 2019-01-15 DIAGNOSIS — I10 ESSENTIAL HYPERTENSION WITH GOAL BLOOD PRESSURE LESS THAN 140/90: ICD-10-CM

## 2019-01-15 DIAGNOSIS — N52.9 ERECTILE DYSFUNCTION, UNSPECIFIED ERECTILE DYSFUNCTION TYPE: ICD-10-CM

## 2019-01-15 LAB
ALT SERPL W P-5'-P-CCNC: 23 U/L (ref 0–70)
CHOLEST SERPL-MCNC: 153 MG/DL
CREAT SERPL-MCNC: 1.16 MG/DL (ref 0.66–1.25)
GFR SERPL CREATININE-BSD FRML MDRD: 63 ML/MIN/{1.73_M2}
HDLC SERPL-MCNC: 53 MG/DL
LDLC SERPL CALC-MCNC: 78 MG/DL
NONHDLC SERPL-MCNC: 100 MG/DL
POTASSIUM SERPL-SCNC: 4.2 MMOL/L (ref 3.4–5.3)
TRIGL SERPL-MCNC: 111 MG/DL

## 2019-01-15 PROCEDURE — 84460 ALANINE AMINO (ALT) (SGPT): CPT | Performed by: FAMILY MEDICINE

## 2019-01-15 PROCEDURE — 82565 ASSAY OF CREATININE: CPT | Performed by: FAMILY MEDICINE

## 2019-01-15 PROCEDURE — 84132 ASSAY OF SERUM POTASSIUM: CPT | Performed by: FAMILY MEDICINE

## 2019-01-15 PROCEDURE — 36415 COLL VENOUS BLD VENIPUNCTURE: CPT | Performed by: FAMILY MEDICINE

## 2019-01-15 PROCEDURE — 99214 OFFICE O/P EST MOD 30 MIN: CPT | Performed by: FAMILY MEDICINE

## 2019-01-15 PROCEDURE — 80061 LIPID PANEL: CPT | Performed by: FAMILY MEDICINE

## 2019-01-15 RX ORDER — LISINOPRIL 10 MG/1
10 TABLET ORAL DAILY
Qty: 90 TABLET | Refills: 1 | Status: SHIPPED | OUTPATIENT
Start: 2019-01-15 | End: 2019-07-29

## 2019-01-15 RX ORDER — SIMVASTATIN 20 MG
20 TABLET ORAL EVERY EVENING
Qty: 90 TABLET | Refills: 1 | Status: SHIPPED | OUTPATIENT
Start: 2019-01-15 | End: 2019-07-29

## 2019-01-15 RX ORDER — SILDENAFIL CITRATE 20 MG/1
20-100 TABLET ORAL DAILY PRN
Qty: 50 TABLET | Refills: 1 | Status: SHIPPED | OUTPATIENT
Start: 2019-01-15 | End: 2019-07-29

## 2019-01-15 ASSESSMENT — PAIN SCALES - GENERAL: PAINLEVEL: NO PAIN (0)

## 2019-01-15 ASSESSMENT — MIFFLIN-ST. JEOR: SCORE: 1697.93

## 2019-01-15 NOTE — PROGRESS NOTES
"  SUBJECTIVE:   Steve Herring is a 73 year old male who presents to clinic today for the following health issues:    Hyperlipidemia Follow-Up      Rate your low fat/cholesterol diet?: fair    Taking statin?  Yes, no muscle aches from statin    Other lipid medications/supplements?:  none    Hypertension Follow-up      Outpatient blood pressures are not being checked.    Low Salt Diet: no added salt      Amount of exercise or physical activity: 2-3 days/week for an average of greater than 60 minutes    Problems taking medications regularly: No    Medication side effects: none    Diet: low salt    Rhinorrhea:  Patient reports that he has intermittent sinus drainage that bothers him mostly when he eats. He states that it is uncomfortable to be blowing his nose when he is out in public, so he is wondering if there is something OTC that would help prevent this.     Past medical, family, and social histories, medications, and allergies are reviewed and updated in Epic.    ROS:  Constitutional, HEENT, cardiovascular, pulmonary, gi and gu systems are negative, except as otherwise noted.    This document serves as a record of the services and decisions personally performed by MESHA BRUNNER. It was created on his/her behalf by Mira Driver, a trained medical scribe. The creation of this document is based on the provider's statements to the medical scribe. Mira Driver, January 15, 2019 7:35 AM    OBJECTIVE:     /70 (BP Location: Left arm, Patient Position: Chair, Cuff Size: Adult Regular)   Pulse 82   Temp 97.6  F (36.4  C) (Oral)   Resp 16   Ht 1.854 m (6' 1\")   Wt 88.9 kg (196 lb)   SpO2 97%   BMI 25.86 kg/m    Body mass index is 25.86 kg/m .  GENERAL: healthy, alert and no distress  EYES: Eyes grossly normal to inspection, PERRL, EOMI, sclerae white and conjunctivae normal  RESP: lungs clear to auscultation - no crackles or wheezes, no areas of dullness, no tachypnea  CV: Heart regular rate and rhythm without " murmur, click or rub. No peripheral edema and peripheral pulses strong  MS: no gross musculoskeletal defects noted, no edema  SKIN: no suspicious lesions or rashes  NEURO: Normal strength and tone, sensory exam grossly normal, mentation intact, oriented times 3 and cranial nerves 2-12 intact  PSYCH: mentation appears normal, affect normal/bright    ASSESSMENT/PLAN:     (E78.5) Hyperlipidemia LDL goal <130  (primary encounter diagnosis)  Comment: Fasting, historically at goal.   Plan: simvastatin (ZOCOR) 20 MG tablet, Lipid panel         reflex to direct LDL Fasting, ALT        Follow up in 6 months at [facility].    (I10) Essential hypertension with goal blood pressure less than 140/90  Comment: Well controlled.   Plan: lisinopril (PRINIVIL/ZESTRIL) 10 MG tablet,         Creatinine, Potassium        Follow up in 6 months.     (J30.0) Vasomotor rhinitis  Comment: Patient would like to try an OTC med first.   Plan: Try occasional oxymetazoline. If that fails, he can send me a message to request a prescription (azelastine or ipratropium).    (N52.9) Erectile dysfunction, unspecified erectile dysfunction type  Comment: Refill request.  Plan: sildenafil (REVATIO) 20 MG tablet            The information in this document, created by the medical scribfredy Driver for me, accurately reflects the services I personally performed and the decisions made by me. I have reviewed and approved this document for accuracy prior to leaving the patient care area.    Mango Gilliland MD  [facility]

## 2019-01-15 NOTE — PATIENT INSTRUCTIONS
At [facility], we strive to deliver an exceptional experience to you, every time we see you.  If you receive a survey in the mail, please send us back your thoughts. We really do value your feedback.    Based on your medical history, these are the current health maintenance/preventive care services that you are due for (some may have been done at this visit.)  Health Maintenance Due   Topic Date Due     INFLUENZA VACCINE (1) 09/01/2018         Suggested websites for health information:  Www.[facility].org : Up to date and easily searchable information on multiple topics.  Www.medlineplus.gov : medication info, interactive tutorials, watch real surgeries online  Www.familydoctor.org : good info from the Academy of Family Physicians  Www.cdc.gov : public health info, travel advisories, epidemics (H1N1)  Www.aap.org : children's health info, normal development, vaccinations  Www.health.[facility].[place].us : [place] dept of health, public health issues in [place], N1N1    Your care team:                            Family Medicine Internal Medicine   MD Aurelio Schaefer MD Shantel Branch-Fleming, MD Katya Georgiev PA-C Nam Ho, MD Pediatrics   RIN Ordoñez, CNP MD Renee Morales CNP, MD Deborah Mielke, MD Kim Thein, APRN CNP      Clinic hours: Monday - Thursday 7 am-7 pm; Fridays 7 am-5 pm.   Urgent care: Monday - Friday 11 am-9 pm; Saturday and Sunday 9 am-5 pm.  Pharmacy : Monday -Thursday 8 am-8 pm; Friday 8 am-6 pm; Saturday and Sunday 9 am-5 pm.     Clinic: [phone]   Pharmacy: [phone]    Afrin (oxymetazoline) or Sinex (shorter acting)

## 2019-07-26 ASSESSMENT — ENCOUNTER SYMPTOMS
DYSURIA: 0
COUGH: 0
JOINT SWELLING: 1
PALPITATIONS: 0
WEAKNESS: 0
NERVOUS/ANXIOUS: 0
HEMATURIA: 0
DIARRHEA: 0
SORE THROAT: 0
CONSTIPATION: 0
FREQUENCY: 0
PARESTHESIAS: 0
HEARTBURN: 0
FEVER: 0
HEADACHES: 0
EYE PAIN: 0
NAUSEA: 0
HEMATOCHEZIA: 0
CHILLS: 0
MYALGIAS: 0
DIZZINESS: 0
ARTHRALGIAS: 1
SHORTNESS OF BREATH: 0
ABDOMINAL PAIN: 0

## 2019-07-26 ASSESSMENT — ACTIVITIES OF DAILY LIVING (ADL): CURRENT_FUNCTION: NO ASSISTANCE NEEDED

## 2019-07-29 ENCOUNTER — OFFICE VISIT (OUTPATIENT)
Dept: FAMILY MEDICINE | Facility: CLINIC | Age: 74
End: 2019-07-29
Payer: COMMERCIAL

## 2019-07-29 VITALS
SYSTOLIC BLOOD PRESSURE: 116 MMHG | HEART RATE: 85 BPM | DIASTOLIC BLOOD PRESSURE: 78 MMHG | TEMPERATURE: 98.5 F | HEIGHT: 73 IN | BODY MASS INDEX: 24.92 KG/M2 | OXYGEN SATURATION: 98 % | WEIGHT: 188 LBS

## 2019-07-29 DIAGNOSIS — I10 ESSENTIAL HYPERTENSION WITH GOAL BLOOD PRESSURE LESS THAN 140/90: ICD-10-CM

## 2019-07-29 DIAGNOSIS — N52.9 ERECTILE DYSFUNCTION, UNSPECIFIED ERECTILE DYSFUNCTION TYPE: ICD-10-CM

## 2019-07-29 DIAGNOSIS — Z00.00 ENCOUNTER FOR MEDICARE ANNUAL WELLNESS EXAM: Primary | ICD-10-CM

## 2019-07-29 DIAGNOSIS — R25.1 TREMOR: ICD-10-CM

## 2019-07-29 DIAGNOSIS — E78.5 HYPERLIPIDEMIA LDL GOAL <130: ICD-10-CM

## 2019-07-29 DIAGNOSIS — E87.6 HYPOPOTASSEMIA: ICD-10-CM

## 2019-07-29 DIAGNOSIS — R49.0 HOARSENESS: ICD-10-CM

## 2019-07-29 LAB
ALT SERPL W P-5'-P-CCNC: 33 U/L (ref 0–70)
ANION GAP SERPL CALCULATED.3IONS-SCNC: 7 MMOL/L (ref 3–14)
BUN SERPL-MCNC: 34 MG/DL (ref 7–30)
CALCIUM SERPL-MCNC: 10 MG/DL (ref 8.5–10.1)
CHLORIDE SERPL-SCNC: 111 MMOL/L (ref 94–109)
CHOLEST SERPL-MCNC: 153 MG/DL
CO2 SERPL-SCNC: 25 MMOL/L (ref 20–32)
CREAT SERPL-MCNC: 1.28 MG/DL (ref 0.66–1.25)
ERYTHROCYTE [DISTWIDTH] IN BLOOD BY AUTOMATED COUNT: 12.2 % (ref 10–15)
GFR SERPL CREATININE-BSD FRML MDRD: 56 ML/MIN/{1.73_M2}
GLUCOSE SERPL-MCNC: 85 MG/DL (ref 70–99)
HCT VFR BLD AUTO: 43.1 % (ref 40–53)
HDLC SERPL-MCNC: 61 MG/DL
HGB BLD-MCNC: 14.6 G/DL (ref 13.3–17.7)
LDLC SERPL CALC-MCNC: 72 MG/DL
MCH RBC QN AUTO: 33 PG (ref 26.5–33)
MCHC RBC AUTO-ENTMCNC: 33.9 G/DL (ref 31.5–36.5)
MCV RBC AUTO: 97 FL (ref 78–100)
NONHDLC SERPL-MCNC: 92 MG/DL
PLATELET # BLD AUTO: 135 10E9/L (ref 150–450)
POTASSIUM SERPL-SCNC: 4.2 MMOL/L (ref 3.4–5.3)
RBC # BLD AUTO: 4.43 10E12/L (ref 4.4–5.9)
SODIUM SERPL-SCNC: 143 MMOL/L (ref 133–144)
TRIGL SERPL-MCNC: 100 MG/DL
WBC # BLD AUTO: 8.3 10E9/L (ref 4–11)

## 2019-07-29 PROCEDURE — 36415 COLL VENOUS BLD VENIPUNCTURE: CPT | Performed by: FAMILY MEDICINE

## 2019-07-29 PROCEDURE — 99213 OFFICE O/P EST LOW 20 MIN: CPT | Mod: 25 | Performed by: FAMILY MEDICINE

## 2019-07-29 PROCEDURE — 80048 BASIC METABOLIC PNL TOTAL CA: CPT | Performed by: FAMILY MEDICINE

## 2019-07-29 PROCEDURE — 84460 ALANINE AMINO (ALT) (SGPT): CPT | Performed by: FAMILY MEDICINE

## 2019-07-29 PROCEDURE — 85027 COMPLETE CBC AUTOMATED: CPT | Performed by: FAMILY MEDICINE

## 2019-07-29 PROCEDURE — G0439 PPPS, SUBSEQ VISIT: HCPCS | Performed by: FAMILY MEDICINE

## 2019-07-29 PROCEDURE — 80061 LIPID PANEL: CPT | Performed by: FAMILY MEDICINE

## 2019-07-29 RX ORDER — LISINOPRIL 10 MG/1
10 TABLET ORAL DAILY
Qty: 90 TABLET | Refills: 1 | Status: SHIPPED | OUTPATIENT
Start: 2019-07-29 | End: 2020-02-13

## 2019-07-29 RX ORDER — SIMVASTATIN 20 MG
20 TABLET ORAL EVERY EVENING
Qty: 90 TABLET | Refills: 1 | Status: SHIPPED | OUTPATIENT
Start: 2019-07-29 | End: 2020-02-11

## 2019-07-29 RX ORDER — DOXYCYCLINE HYCLATE 100 MG
100 TABLET ORAL 2 TIMES DAILY
Qty: 20 TABLET | Refills: 0 | Status: SHIPPED | OUTPATIENT
Start: 2019-07-29 | End: 2019-08-08

## 2019-07-29 RX ORDER — SILDENAFIL CITRATE 20 MG/1
20-100 TABLET ORAL DAILY PRN
Qty: 50 TABLET | Refills: 1 | Status: SHIPPED | OUTPATIENT
Start: 2019-07-29 | End: 2020-11-09

## 2019-07-29 ASSESSMENT — ENCOUNTER SYMPTOMS
CONSTIPATION: 0
HEMATURIA: 0
NAUSEA: 0
HEADACHES: 0
PALPITATIONS: 0
COUGH: 0
DYSURIA: 0
HEARTBURN: 0
ABDOMINAL PAIN: 0
DIZZINESS: 0
DIARRHEA: 0
CHILLS: 0
SORE THROAT: 0
SHORTNESS OF BREATH: 0
EYE PAIN: 0
NERVOUS/ANXIOUS: 0
WEAKNESS: 0
HEMATOCHEZIA: 0
ARTHRALGIAS: 1
MYALGIAS: 0
FREQUENCY: 0
FEVER: 0
PARESTHESIAS: 0
JOINT SWELLING: 1

## 2019-07-29 ASSESSMENT — PAIN SCALES - GENERAL: PAINLEVEL: NO PAIN (0)

## 2019-07-29 ASSESSMENT — MIFFLIN-ST. JEOR: SCORE: 1661.64

## 2019-07-29 ASSESSMENT — ACTIVITIES OF DAILY LIVING (ADL): CURRENT_FUNCTION: NO ASSISTANCE NEEDED

## 2019-07-29 NOTE — PROGRESS NOTES
"SUBJECTIVE:   Steve Herring is a 73 year old male who presents for Preventive Visit.    Are you in the first 12 months of your Medicare coverage?  No    Healthy Habits:     In general, how would you rate your overall health?  Good    Frequency of exercise:  2-3 days/week    Duration of exercise:  45-60 minutes    Do you usually eat at least 4 servings of fruit and vegetables a day, include whole grains    & fiber and avoid regularly eating high fat or \"junk\" foods?  No    Taking medications regularly:  Yes    Medication side effects:  Significant flushing    Ability to successfully perform activities of daily living:  No assistance needed    Home Safety:  No safety concerns identified    Hearing Impairment:  No hearing concerns    In the past 6 months, have you been bothered by leaking of urine?  No    In general, how would you rate your overall mental or emotional health?  Excellent      PHQ-2 Total Score: 0    Additional concerns today:  Yes    Do you feel safe in your environment? YES    Do you have a Health Care Directive? No: Advance care planning reviewed with patient; information given to patient to review.      Fall risk  Fallen 2 or more times in the past year?: No  Any fall with injury in the past year?: No    Cognitive Screening   1) Repeat 3 items (Leader, Season, Table)  Yes  2) Clock draw: NORMAL  3) 3 item recall: Recalls 3 objects  Results: NORMAL clock, 3 items recalled: COGNITIVE IMPAIRMENT LESS LIKELY    Mini-CogTM Copyright EBONY Cormier. Licensed by the author for use in [facility]; reprinted with permission (noble@.[facility]). All rights reserved.      Do you have sleep apnea, excessive snoring or daytime drowsiness?: no    Past medical, family, and social histories, medications, and allergies are reviewed and updated in Epic.         Social History     Tobacco Use     Smoking status: Never Smoker     Smokeless tobacco: Never Used   Substance Use Topics     Alcohol use: Yes     Comment: " seldom     If you drink alcohol do you typically have >3 drinks per day or >7 drinks per week? No    Alcohol Use 7/29/2019   Prescreen: >3 drinks/day or >7 drinks/week? -   Prescreen: >3 drinks/day or >7 drinks/week? No           Current providers sharing in care for this patient include:   Patient Care Team:  Mango Gilliland MD as PCP - General  Mango Gilliland MD as Assigned PCP    The following health maintenance items are reviewed in Epic and correct as of today:  Health Maintenance   Topic Date Due     MEDICARE ANNUAL WELLNESS VISIT  06/11/2019     FALL RISK ASSESSMENT  06/11/2019     INFLUENZA VACCINE (1) 09/01/2019     ADVANCE CARE PLANNING  05/03/2021     COLONOSCOPY  08/14/2022     DTAP/TDAP/TD IMMUNIZATION (4 - Td) 12/05/2027     HEPATITIS C SCREENING  Completed     PHQ-2  Completed     ZOSTER IMMUNIZATION  Completed     AORTIC ANEURYSM SCREENING (SYSTEM ASSIGNED)  Completed     IPV IMMUNIZATION  Aged Out     MENINGITIS IMMUNIZATION  Aged Out       Review of Systems   Constitutional: Negative for chills and fever.   HENT: Negative for congestion, ear pain, hearing loss and sore throat.    Eyes: Negative for pain and visual disturbance.   Respiratory: Negative for cough and shortness of breath.    Cardiovascular: Negative for chest pain, palpitations and peripheral edema.   Gastrointestinal: Negative for abdominal pain, constipation, diarrhea, heartburn, hematochezia and nausea.   Genitourinary: Positive for impotence. Negative for discharge, dysuria, frequency, genital sores, hematuria and urgency.   Musculoskeletal: Positive for arthralgias and joint swelling. Negative for myalgias.   Skin: Negative for rash.   Neurological: Negative for dizziness, weakness, headaches and paresthesias.   Psychiatric/Behavioral: Negative for mood changes. The patient is not nervous/anxious.      ENT/MOUTH: hoarseness since flight early July  NEURO: tremor in hands for 6+ mo        OBJECTIVE:   /78 (BP Location:  "Left arm, Patient Position: Chair, Cuff Size: Adult Regular)   Pulse 85   Temp 98.5  F (36.9  C) (Oral)   Ht 1.854 m (6' 1\")   Wt 85.3 kg (188 lb)   SpO2 98%   BMI 24.80 kg/m   Estimated body mass index is 24.8 kg/m  as calculated from the following:    Height as of this encounter: 1.854 m (6' 1\").    Weight as of this encounter: 85.3 kg (188 lb).  EXAM:   GENERAL: healthy, alert and no distress  EYES: Eyes grossly normal to inspection, PERRL and conjunctivae and sclerae normal  HENT: ear canals and TM's normal, nose and mouth without ulcers or lesions, voice does seem hoarse to me  NECK: no adenopathy, no asymmetry, masses, or scars and thyroid normal to palpation  RESP: lungs clear to auscultation - no rales, rhonchi or wheezes  CV: regular rate and rhythm, normal S1 S2, no S3 or S4, no murmur, click or rub, no peripheral edema and peripheral pulses strong  ABDOMEN: soft, nontender, no hepatosplenomegaly, no masses and bowel sounds normal  MS: no gross musculoskeletal defects noted, no edema  SKIN: no suspicious lesions or rashes  NEURO: Normal strength and tone, tremor hands, sensory exam grossly normal, mentation intact, cranial nerves 2-12 intact, DTR's normal and symmetric and gait normal   PSYCH: mentation appears normal, affect normal/bright      ASSESSMENT / PLAN:   (Z00.00) Encounter for Medicare annual wellness exam  (primary encounter diagnosis)  Comment: Negative screening exam; up-to-date on preventive services.   Plan: f/u 1 year    (E78.5) Hyperlipidemia LDL goal <130  Comment:   Plan: Lipid panel reflex to direct LDL Non-fasting,         ALT, simvastatin (ZOCOR) 20 MG tablet        Return in about 6 months (around 1/29/2020) for cholesterol, blood pressure check.     (I10) Essential hypertension with goal blood pressure less than 140/90  Comment: well controlled  Plan: Basic metabolic panel, CBC with platelets,         lisinopril (PRINIVIL/ZESTRIL) 10 MG tablet        F/u 6 mo    (E87.6) " "Hypopotassemia  Comment:   Plan: Basic metabolic panel            (R49.0) Hoarseness  Comment:   Plan: doxycycline hyclate (VIBRA-TABS) 100 MG tablet,        OTOLARYNGOLOGY REFERRAL          (R25.1) Tremor  Comment: essential tremor vs early parkinsonism  Plan: pt declines Neuro referral for now. Handouts provided    (N52.9) Erectile dysfunction, unspecified erectile dysfunction type  Comment: refill request  Plan: sildenafil (REVATIO) 20 MG tablet            End of Life Planning:  Patient currently has an advanced directive: No.  I have verified the patient's ablity to prepare an advanced directive/make health care decisions.     COUNSELING:  Reviewed preventive health counseling, as reflected in patient instructions    Estimated body mass index is 24.8 kg/m  as calculated from the following:    Height as of this encounter: 1.854 m (6' 1\").    Weight as of this encounter: 85.3 kg (188 lb).         reports that he has never smoked. He has never used smokeless tobacco.      Appropriate preventive services were discussed with this patient, including applicable screening as appropriate for cardiovascular disease, diabetes, osteopenia/osteoporosis, and glaucoma.  As appropriate for age/gender, discussed screening for colorectal cancer, prostate cancer, breast cancer, and cervical cancer. Checklist reviewing preventive services available has been given to the patient.    Reviewed patients plan of care and provided an AVS. The Basic Care Plan (routine screening as documented in Health Maintenance) for Steve meets the Care Plan requirement. This Care Plan has been established and reviewed with the Patient.    Counseling Resources:  ATP IV Guidelines  Pooled Cohorts Equation Calculator  Breast Cancer Risk Calculator  FRAX Risk Assessment  ICSI Preventive Guidelines  Dietary Guidelines for Americans, 2010  [employer]'s MyPlate  ASA Prophylaxis  Lung CA Screening    Mango Gilliland MD  [facility]  "

## 2019-07-29 NOTE — PATIENT INSTRUCTIONS
================================================================================  Normal Values   Blood pressure  <140/90 for most adults    <130/80 for some chronic diseases (ask your care team about yours)    BMI (body mass index)  18.5-25 kg/m2 (based on height and weight)     Thank you for visiting [facility]    Normal or non-critical lab and imaging results will be communicated to you by MyChart, letter or phone within 7 days.  If you do not hear from us within 10 days, please call the clinic. If you have a critical or abnormal lab result, we will notify you by phone as soon as possible.     If you have any questions regarding your visit please contact:     Team Comfort:   Clinic Hours Telephone Number   Dr. Mango Peacock Dr. [place] 7am-5pm  Monday - Friday [phone]  Christian RN  Michelle RN  Berta RN   Pharmacy 8:00am-8pm Monday-Friday    9am-5pm Saturday-Sunday [phone]   Urgent Care 11am-9pm Monday-Friday        9am-5pm Saturday-Sunday [phone]     After hours, weekend or if you need to make an appointment with your primary provider please call [phone].   After Hours nurse advise: call [facility]: [phone]    Medication Refills:  Call your pharmacy and they will forward the refill to us. Please allow 3 business days for your refills to be completed.          Patient Education   Personalized Prevention Plan  You are due for the preventive services outlined below.  Your care team is available to assist you in scheduling these services.  If you have already completed any of these items, please share that information with your care team to update in your medical record.  Health Maintenance Due   Topic Date Due     Annual Wellness Visit  06/11/2019     FALL RISK ASSESSMENT  06/11/2019        Patient Education     Essential Tremor (ET)  What is essential tremor?  Essential tremor (ET) is a neurological disorder that  causes your hands, head, trunk, voice, or legs to shake rhythmically. It is often confused with Parkinson disease.  ET is the most common trembling disorder that people have. Everyone has some ET, but the movements usually cannot be seen or felt. When tremors are noticeable, the condition is classified as ET.  ET is most common among people older than age 65, but it can affect people at any age.  What causes ET?  ET can occur in different people for different reasons:    Familial essential tremor. In most people, the condition seems to be passed down from a parent to a child. If your parent has ET, there is a 50% chance that you or your children will inherit the gene responsible for the condition.    Essential tremor related to another disorder. Sometimes, a tremor is a symptom of another neurological disorder, such as Parkinson disease or dystonia. Sometimes, ET is mistaken for these other diseases when they are not present. A healthcare provider s careful diagnosis is extremely important.  The cause of ET isn t known. However, one theory suggests that your cerebellum and other parts of your brain are not communicating correctly. The cerebellum is a part of the brain that controls muscle coordination.  What are the symptoms of ET?  If you have ET, you will have shaking and trembling at different times and in different situations, but some characteristics are common to all. Here is what you might typically experience:    Tremors occur when you move and are less noticeable when you rest.    Certain medicines, caffeine, or stress can make your tremors worse.    Tremor may improve with ingestion of a small amount of alcohol (such as wine)    Tremors get worse as you age.    Tremors don t affect both sides of your body in the same way.  Here are different signs of essential tremor:    Tremors that are most obvious in your hands    Difficulty doing tasks with your hands, such as writing or using tools    Shaking or  quivering sound in your voice    Uncontrollable head-nodding    In rare instances, tremors in your legs or feet  How is ET diagnosed?  Your rapid, uncontrollable trembling, as well as questions about your medical and family history, can help your healthcare provider determine if you have familial ET. He or she will probably need to rule out other conditions that could cause shaking or trembling. For example, tremors could be symptoms of diseases, such as hyperthyroidism. Your healthcare provider might test you for those, as well.  In some cases, the tremors might be related to other factors. To find out for certain, your healthcare provider may have you try to:    Abstain from heavy alcohol use; if you re an alcoholic, trembling is a common symptom.    Cut out cigarette smoking.    Avoid caffeine.    Avoid certain medicines  How is ET treated?   Propanolol and primidone are 2 medicines often prescribed to treat ET. Propanolol blocks the stimulating action of neurotransmitters to calm your trembling. Primidone is a common antiseizure medicine that also controls the actions of neurotransmitters.  Gabapentin and topiramate are 2 other antiseizure medicines that are sometimes prescribed. In some cases, tranquilizers like alprazolam or clonazepam might be suggested.  For ET in your hands, botulinum toxin (Botox) injections have shown some promise in easing the trembling. They work by weakening the surrounding muscles around your hands. For severe tremors, a stimulating device (deep brain stimulator) surgically implanted in your brain may help.  Can ET be prevented?   The specific cause of ET is not known, so scientists are not sure how the condition can be prevented.  Living with ET  ET is usually not dangerous, but it can certainly be frustrating if you have to deal with it. Certain factors can make tremors worse, so the following steps may help to decrease tremors:    Avoid alcohol, cigarettes, and caffeine    Avoid  stressful situations as much as possible    Use relaxation techniques, such as yoga, deep-breathing exercises, or biofeedback    Check with your healthcare provider to see if any medicines you re taking could be making your tremors worse.  Talk with your healthcare provider about other options, such as surgery, if ET starts to affect your quality of life.  When should I call my healthcare provider?  If you have been diagnosed with ET, talk with your healthcare provider about when you might need to call. He or she will likely advise you to call if your tremors become worse, or if you develop new neurologic symptoms, such as numbness or weakness.  Key points about essential tremors    ET is a neurological disorder that causes your hands, head, trunk, voice, or legs to shake rhythmically. The cause is not known, but it is often passed down from a parent to a child.    ET is sometimes confused with other types of tremor, so getting the right diagnosis is important.    Tremors tend to be worse during movement than when at rest. The tremors are usually not dangerous, but they can get worse over time.    Avoiding things that might make tremors worse, such as stress, caffeine, and certain medicines, may be helpful.    Medicines can also help control or limit tremors in some people. Severe tremors can sometimes be treated with surgery.    Next steps  Tips to help you get the most from a visit to your healthcare provider:    Know the reason for your visit and what you want to happen.    Before your visit, write down questions you want answered.    Bring someone with you to help you ask questions and remember what your provider tells you.    At the visit, write down the name of a new diagnosis, and any new medicines, treatments, or tests. Also write down any new instructions your provider gives you.    Know why a new medicine or treatment is prescribed, and how it will help you. Also know what the side effects are.    Ask if  your condition can be treated in other ways.    Know why a test or procedure is recommended and what the results could mean.    Know what to expect if you do not take the medicine or have the test or procedure.    If you have a follow-up appointment, write down the date, time, and purpose for that visit.    Know how you can contact your provider if you have questions.      3341-4367 [employer]. [address] [place] [address], [place] [address]. All rights reserved. This information is not intended as a substitute for professional medical care. Always follow your healthcare professional's instructions.           Patient Education     Patient Education     Understanding Parkinson Disease    Parkinson disease is a condition that affects control over your movements. It s caused by a lack of dopamine, a chemical that helps the nerve cells in your brain communicate with each other. When dopamine is missing from certain areas of the brain, the messages that tell your body how to move are lost or distorted. This can lead to symptoms such as shaking, stiffness, and slow movement. There s no cure for Parkinson disease. But proper treatment can help ease symptoms and allow you to live a full, active life.  Changes in the brain  Dopamine is produced in a small area of the brain called the substantia nigra. For reasons that aren t yet clear, the nerve cells in this region that make dopamine begin to die. This means less dopamine is available to help control your movements. When healthy, the substantia nigra makes enough dopamine to help control your body s movements.  Symptoms of Parkinson disease  Parkinson symptoms often appear gradually. Some may take years to develop. Others you may not have at all. Below are the most common:    Shaking (resting tremor). This can affect the hands, arms, and legs. Most often, the shaking is worse on one side of the body. It usually lessens when the arm or leg (limb) is used.    Slow  movement (bradykinesia). This can affect the whole body. People may walk with short, shuffling steps. They can also feel  frozen  and unable to move.    Stiffness (rigidity). This occurs when muscles don t relax. It can cause muscle aches and stooped posture.    Other symptoms. This includes balance problems, small handwriting, soft voice volume, constipation, reduced or  flat  facial expression, and sleep problems. Memory loss or other problems with thinking may also occur later in the progression of the disease.  How is Parkinson diagnosed?  There is no single test for Parkinson disease. The diagnosis is based on your symptoms, health history, and a physical exam. You may also have tests to help rule out other problems. These may include blood tests to look for diseases that cause similar symptoms. They can also include brain-imaging tests, such as an MRI of the brain  Parkinsonism is the name for a group of brain conditions that all have symptoms similar to Parkinson disease. However, the causes of these symptoms are different. In some cases, Parkinson-type symptoms may result from strokes or head injury. They can also be caused by medicines or other diseases that affect the brain. In general, these conditions can t be treated as well using the medicines that help people with Parkinson disease.  Date Last Reviewed: 2/1/2018 2000-2018 [employer]. [address] [place] [address], [place] [address]. All rights reserved. This information is not intended as a substitute for professional medical care. Always follow your healthcare professional's instructions.

## 2020-02-09 DIAGNOSIS — I10 ESSENTIAL HYPERTENSION WITH GOAL BLOOD PRESSURE LESS THAN 140/90: ICD-10-CM

## 2020-02-09 DIAGNOSIS — E78.5 HYPERLIPIDEMIA LDL GOAL <130: ICD-10-CM

## 2020-02-09 NOTE — TELEPHONE ENCOUNTER
"Requested Prescriptions   Pending Prescriptions Disp Refills     simvastatin (ZOCOR) 20 MG tablet [Pharmacy Med Name: SIMVASTATIN 20 MG TABLET]  Last Written Prescription Date:  7/29/19  Last Fill Quantity: 90,  # refills: 1   Last office visit: 7/29/2019 with prescribing provider:  Darshana   Future Office Visit:     90 tablet 1     Sig: TAKE 1 TABLET (20 MG) BY MOUTH EVERY EVENING FOR CHOLESTEROL.       Statins Protocol Passed - 2/9/2020  9:04 AM        Passed - LDL on file in past 12 months     Recent Labs   Lab Test 07/29/19  1637   LDL 72             Passed - No abnormal creatine kinase in past 12 months     No lab results found.             Passed - Recent (12 mo) or future (30 days) visit within the authorizing provider's specialty     Patient has had an office visit with the authorizing provider or a provider within the authorizing providers department within the previous 12 mos or has a future within next 30 days. See \"Patient Info\" tab in inbasket, or \"Choose Columns\" in Meds & Orders section of the refill encounter.              Passed - Medication is active on med list        Passed - Patient is age 18 or older        lisinopril (PRINIVIL/ZESTRIL) 10 MG tablet [Pharmacy Med Name: LISINOPRIL 10 MG TABLET]  Last Written Prescription Date:  7/29/19  Last Fill Quantity: 90,  # refills: 1   Last office visit: 7/29/2019 with prescribing provider:  Darshana   Future Office Visit:     90 tablet 1     Sig: TAKE 1 TABLET (10 MG) BY MOUTH DAILY FOR BLOOD PRESSURE.       ACE Inhibitors (Including Combos) Protocol Failed - 2/9/2020  9:04 AM        Failed - Normal serum creatinine on file in past 12 months     Recent Labs   Lab Test 07/29/19  1637   CR 1.28*             Passed - Blood pressure under 140/90 in past 12 months     BP Readings from Last 3 Encounters:   07/29/19 116/78   01/15/19 120/70   06/11/18 130/60                 Passed - Recent (12 mo) or future (30 days) visit within the authorizing provider's specialty " "    Patient has had an office visit with the authorizing provider or a provider within the authorizing providers department within the previous 12 mos or has a future within next 30 days. See \"Patient Info\" tab in inbasket, or \"Choose Columns\" in Meds & Orders section of the refill encounter.              Passed - Medication is active on med list        Passed - Patient is age 18 or older        Passed - Normal serum potassium on file in past 12 months     Recent Labs   Lab Test 07/29/19  1637   POTASSIUM 4.2               "

## 2020-02-11 RX ORDER — SIMVASTATIN 20 MG
20 TABLET ORAL EVERY EVENING
Qty: 90 TABLET | Refills: 1 | Status: SHIPPED | OUTPATIENT
Start: 2020-02-11 | End: 2020-04-09

## 2020-02-11 NOTE — TELEPHONE ENCOUNTER
Prescription approved per [facility] Refill Protocol. - Simvastatin  Routing refill request to provider for review/approval because:  Labs out of range:  India Acosta RN  [facility]

## 2020-02-13 RX ORDER — LISINOPRIL 10 MG/1
10 TABLET ORAL DAILY
Qty: 90 TABLET | Refills: 1 | Status: SHIPPED | OUTPATIENT
Start: 2020-02-13 | End: 2020-04-09

## 2020-02-13 NOTE — TELEPHONE ENCOUNTER
"Please assist in scheduling         \"Return in about 6 months (around 1/29/2020) for cholesterol, blood pressure check.\"  "

## 2020-04-09 ENCOUNTER — OFFICE VISIT (OUTPATIENT)
Dept: FAMILY MEDICINE | Facility: CLINIC | Age: 75
End: 2020-04-09
Payer: COMMERCIAL

## 2020-04-09 VITALS
BODY MASS INDEX: 25.95 KG/M2 | HEIGHT: 73 IN | TEMPERATURE: 98.1 F | DIASTOLIC BLOOD PRESSURE: 74 MMHG | SYSTOLIC BLOOD PRESSURE: 129 MMHG | WEIGHT: 195.8 LBS | HEART RATE: 90 BPM | RESPIRATION RATE: 18 BRPM | OXYGEN SATURATION: 95 %

## 2020-04-09 DIAGNOSIS — E78.5 HYPERLIPIDEMIA LDL GOAL <130: Primary | ICD-10-CM

## 2020-04-09 DIAGNOSIS — I10 ESSENTIAL HYPERTENSION WITH GOAL BLOOD PRESSURE LESS THAN 140/90: ICD-10-CM

## 2020-04-09 DIAGNOSIS — E87.6 HYPOPOTASSEMIA: ICD-10-CM

## 2020-04-09 LAB
ALT SERPL W P-5'-P-CCNC: 16 U/L (ref 0–70)
ANION GAP SERPL CALCULATED.3IONS-SCNC: 3 MMOL/L (ref 3–14)
BUN SERPL-MCNC: 25 MG/DL (ref 7–30)
CALCIUM SERPL-MCNC: 9.6 MG/DL (ref 8.5–10.1)
CHLORIDE SERPL-SCNC: 108 MMOL/L (ref 94–109)
CHOLEST SERPL-MCNC: 135 MG/DL
CO2 SERPL-SCNC: 30 MMOL/L (ref 20–32)
CREAT SERPL-MCNC: 0.97 MG/DL (ref 0.66–1.25)
GFR SERPL CREATININE-BSD FRML MDRD: 77 ML/MIN/{1.73_M2}
GLUCOSE SERPL-MCNC: 106 MG/DL (ref 70–99)
HDLC SERPL-MCNC: 44 MG/DL
LDLC SERPL CALC-MCNC: 61 MG/DL
NONHDLC SERPL-MCNC: 91 MG/DL
POTASSIUM SERPL-SCNC: 4.6 MMOL/L (ref 3.4–5.3)
SODIUM SERPL-SCNC: 141 MMOL/L (ref 133–144)
TRIGL SERPL-MCNC: 148 MG/DL

## 2020-04-09 PROCEDURE — 99214 OFFICE O/P EST MOD 30 MIN: CPT | Performed by: FAMILY MEDICINE

## 2020-04-09 PROCEDURE — 80061 LIPID PANEL: CPT | Performed by: FAMILY MEDICINE

## 2020-04-09 PROCEDURE — 84460 ALANINE AMINO (ALT) (SGPT): CPT | Performed by: FAMILY MEDICINE

## 2020-04-09 PROCEDURE — 80048 BASIC METABOLIC PNL TOTAL CA: CPT | Performed by: FAMILY MEDICINE

## 2020-04-09 PROCEDURE — 36415 COLL VENOUS BLD VENIPUNCTURE: CPT | Performed by: FAMILY MEDICINE

## 2020-04-09 RX ORDER — LISINOPRIL 10 MG/1
10 TABLET ORAL DAILY
Qty: 90 TABLET | Refills: 1 | Status: SHIPPED | OUTPATIENT
Start: 2020-04-09 | End: 2020-11-09

## 2020-04-09 RX ORDER — SIMVASTATIN 20 MG
20 TABLET ORAL EVERY EVENING
Qty: 90 TABLET | Refills: 1 | Status: SHIPPED | OUTPATIENT
Start: 2020-04-09 | End: 2020-11-09

## 2020-04-09 ASSESSMENT — MIFFLIN-ST. JEOR: SCORE: 1692.02

## 2020-04-09 ASSESSMENT — PAIN SCALES - GENERAL: PAINLEVEL: NO PAIN (0)

## 2020-04-09 NOTE — PROGRESS NOTES
"Subjective     Steve Herring is a 74 year old male who presents to clinic today for the following health issues:    HPI   Hyperlipidemia Follow-Up      Are you regularly taking any medication or supplement to lower your cholesterol?   Yes- simvastatin    Are you having muscle aches or other side effects that you think could be caused by your cholesterol lowering medication?  No    Hypertension Follow-up      Do you check your blood pressure regularly outside of the clinic? Yes     Are you following a low salt diet? No    Are your blood pressures ever more than 140 on the top number (systolic) OR more   than 90 on the bottom number (diastolic), for example 140/90? Yes      How many servings of fruits and vegetables do you eat daily?  2-3    On average, how many sweetened beverages do you drink each day (Examples: soda, juice, sweet tea, etc.  Do NOT count diet or artificially sweetened beverages)?   0    How many days per week do you exercise enough to make your heart beat faster? 3 or less    How many minutes a day do you exercise enough to make your heart beat faster? 60 or more    How many days per week do you miss taking your medication? 0    Past medical, family, and social histories, medications, and allergies are reviewed and updated in [employer].     Review of Systems   ROS COMP: Constitutional, HEENT, cardiovascular, pulmonary, gi and gu systems are negative, except as otherwise noted. No change in occ cough, occ palpitations, and chronic rhinitis.  Recently in [place], but self-quarantined afterward, no fever, SOB, or new cough      Objective    /74 (BP Location: Left arm, Patient Position: Sitting, Cuff Size: Adult Regular)   Pulse 90   Temp 98.1  F (36.7  C) (Oral)   Resp 18   Ht 1.854 m (6' 1\")   Wt 88.8 kg (195 lb 12.8 oz)   SpO2 95%   BMI 25.83 kg/m    Body mass index is 25.83 kg/m .  Physical Exam   GENERAL: healthy, alert and no distress  EYES: Eyes grossly normal to inspection, PERRL, EOMI, " sclerae white and conjunctivae normal  RESP: lungs clear to auscultation - no crackles or wheezes, no areas of dullness, no tachypnea  CV: Heart regular rate and rhythm without murmur, click or rub. No peripheral edema and peripheral pulses strong  MS: no gross musculoskeletal defects noted, no edema  SKIN: no suspicious lesions or rashes to visible skin  NEURO: Normal strength and tone, sensory exam grossly normal, mentation intact, oriented times 3 and cranial nerves 2-12 intact  PSYCH: mentation appears normal, affect normal/bright     Diagnostic Test Results:     Ref. Range 7/29/2019 16:37   Sodium Latest Ref Range: 133 - 144 mmol/L 143   Potassium Latest Ref Range: 3.4 - 5.3 mmol/L 4.2   Chloride Latest Ref Range: 94 - 109 mmol/L 111 (H)   Carbon Dioxide Latest Ref Range: 20 - 32 mmol/L 25   Urea Nitrogen Latest Ref Range: 7 - 30 mg/dL 34 (H)   Creatinine Latest Ref Range: 0.66 - 1.25 mg/dL 1.28 (H)   GFR Estimate Latest Ref Range: >60 mL/min/1.73_m2 56 (L)   Calcium Latest Ref Range: 8.5 - 10.1 mg/dL 10.0   Anion Gap Latest Ref Range: 3 - 14 mmol/L 7   ALT Latest Ref Range: 0 - 70 U/L 33   Cholesterol Latest Ref Range: <200 mg/dL 153   HDL Cholesterol Latest Ref Range: >39 mg/dL 61   LDL Cholesterol Calculated Latest Ref Range: <100 mg/dL 72   Non HDL Cholesterol Latest Ref Range: <130 mg/dL 92   Triglycerides Latest Ref Range: <150 mg/dL 100   Glucose Latest Ref Range: 70 - 99 mg/dL 85           Assessment & Plan     (E78.5) Hyperlipidemia LDL goal <130  (primary encounter diagnosis)  Comment: fasting, historically at goal   Plan: simvastatin (ZOCOR) 20 MG tablet, Lipid panel         reflex to direct LDL Non-fasting, ALT        Return in about 6 months (around 10/9/2020) for full physical, lab tests, recheck medications.      (I10) Essential hypertension with goal blood pressure less than 140/90  Comment: well controlled, mild increase in Cr last check  Plan: lisinopril (ZESTRIL) 10 MG tablet, Basic          "metabolic panel        F/u 6 mo    (E87.6) Hypopotassemia  Comment: normal last check   Plan: Basic metabolic panel               BMI:   Estimated body mass index is 25.83 kg/m  as calculated from the following:    Height as of this encounter: 1.854 m (6' 1\").    Weight as of this encounter: 88.8 kg (195 lb 12.8 oz).       Return in about 6 months (around 10/9/2020) for full physical, lab tests, recheck medications.    Mango Gilliland MD  [facility]      "

## 2020-11-08 ASSESSMENT — ENCOUNTER SYMPTOMS
FEVER: 0
MYALGIAS: 0
JOINT SWELLING: 1
SHORTNESS OF BREATH: 0
HEADACHES: 0
NERVOUS/ANXIOUS: 0
DYSURIA: 0
COUGH: 1
WEAKNESS: 1
HEARTBURN: 0
PARESTHESIAS: 0
ARTHRALGIAS: 1
SORE THROAT: 0
NAUSEA: 0
CONSTIPATION: 0
FREQUENCY: 0
ABDOMINAL PAIN: 0
DIZZINESS: 0
EYE PAIN: 0
HEMATOCHEZIA: 0
HEMATURIA: 0
PALPITATIONS: 0
DIARRHEA: 0
CHILLS: 0

## 2020-11-08 ASSESSMENT — ACTIVITIES OF DAILY LIVING (ADL): CURRENT_FUNCTION: NO ASSISTANCE NEEDED

## 2020-11-09 ENCOUNTER — OFFICE VISIT (OUTPATIENT)
Dept: FAMILY MEDICINE | Facility: CLINIC | Age: 75
End: 2020-11-09
Payer: COMMERCIAL

## 2020-11-09 VITALS
DIASTOLIC BLOOD PRESSURE: 60 MMHG | HEIGHT: 73 IN | HEART RATE: 57 BPM | OXYGEN SATURATION: 97 % | RESPIRATION RATE: 18 BRPM | WEIGHT: 197 LBS | SYSTOLIC BLOOD PRESSURE: 138 MMHG | BODY MASS INDEX: 26.11 KG/M2 | TEMPERATURE: 98.6 F

## 2020-11-09 DIAGNOSIS — Z00.00 ENCOUNTER FOR MEDICARE ANNUAL WELLNESS EXAM: Primary | ICD-10-CM

## 2020-11-09 DIAGNOSIS — E78.5 HYPERLIPIDEMIA LDL GOAL <130: ICD-10-CM

## 2020-11-09 DIAGNOSIS — R00.2 PALPITATIONS: ICD-10-CM

## 2020-11-09 DIAGNOSIS — N52.9 ERECTILE DYSFUNCTION, UNSPECIFIED ERECTILE DYSFUNCTION TYPE: ICD-10-CM

## 2020-11-09 DIAGNOSIS — I10 ESSENTIAL HYPERTENSION WITH GOAL BLOOD PRESSURE LESS THAN 140/90: ICD-10-CM

## 2020-11-09 DIAGNOSIS — Z86.0101 HISTORY OF ADENOMATOUS POLYP OF COLON: ICD-10-CM

## 2020-11-09 DIAGNOSIS — R07.9 CHEST PAIN, UNSPECIFIED TYPE: ICD-10-CM

## 2020-11-09 LAB
ALT SERPL W P-5'-P-CCNC: 18 U/L (ref 0–70)
CHOLEST SERPL-MCNC: 146 MG/DL
CREAT SERPL-MCNC: 1.08 MG/DL (ref 0.66–1.25)
GFR SERPL CREATININE-BSD FRML MDRD: 68 ML/MIN/{1.73_M2}
HDLC SERPL-MCNC: 48 MG/DL
LDLC SERPL CALC-MCNC: 74 MG/DL
NONHDLC SERPL-MCNC: 98 MG/DL
POTASSIUM SERPL-SCNC: 4.3 MMOL/L (ref 3.4–5.3)
TRIGL SERPL-MCNC: 121 MG/DL

## 2020-11-09 PROCEDURE — 80061 LIPID PANEL: CPT | Performed by: FAMILY MEDICINE

## 2020-11-09 PROCEDURE — 36415 COLL VENOUS BLD VENIPUNCTURE: CPT | Performed by: FAMILY MEDICINE

## 2020-11-09 PROCEDURE — 84132 ASSAY OF SERUM POTASSIUM: CPT | Performed by: FAMILY MEDICINE

## 2020-11-09 PROCEDURE — 99397 PER PM REEVAL EST PAT 65+ YR: CPT | Performed by: FAMILY MEDICINE

## 2020-11-09 PROCEDURE — 84460 ALANINE AMINO (ALT) (SGPT): CPT | Performed by: FAMILY MEDICINE

## 2020-11-09 PROCEDURE — 99213 OFFICE O/P EST LOW 20 MIN: CPT | Mod: 25 | Performed by: FAMILY MEDICINE

## 2020-11-09 PROCEDURE — 82565 ASSAY OF CREATININE: CPT | Performed by: FAMILY MEDICINE

## 2020-11-09 RX ORDER — SIMVASTATIN 20 MG
20 TABLET ORAL EVERY EVENING
Qty: 90 TABLET | Refills: 1 | Status: SHIPPED | OUTPATIENT
Start: 2020-11-09 | End: 2020-12-16

## 2020-11-09 RX ORDER — LISINOPRIL 10 MG/1
10 TABLET ORAL DAILY
Qty: 90 TABLET | Refills: 1 | Status: SHIPPED | OUTPATIENT
Start: 2020-11-09 | End: 2021-03-21

## 2020-11-09 RX ORDER — SILDENAFIL CITRATE 20 MG/1
20-100 TABLET ORAL DAILY PRN
Qty: 50 TABLET | Refills: 1 | Status: SHIPPED | OUTPATIENT
Start: 2020-11-09 | End: 2022-04-04

## 2020-11-09 ASSESSMENT — ENCOUNTER SYMPTOMS
PARESTHESIAS: 0
HEADACHES: 0
ARTHRALGIAS: 1
DYSURIA: 0
SHORTNESS OF BREATH: 0
ABDOMINAL PAIN: 0
NERVOUS/ANXIOUS: 0
DIARRHEA: 0
MYALGIAS: 0
HEMATOCHEZIA: 0
DIZZINESS: 0
SORE THROAT: 0
HEMATURIA: 0
EYE PAIN: 0
HEARTBURN: 0
FREQUENCY: 0
WEAKNESS: 1
CONSTIPATION: 0
COUGH: 1
CHILLS: 0
FEVER: 0
NAUSEA: 0
PALPITATIONS: 0
JOINT SWELLING: 1

## 2020-11-09 ASSESSMENT — PAIN SCALES - GENERAL: PAINLEVEL: NO PAIN (0)

## 2020-11-09 ASSESSMENT — MIFFLIN-ST. JEOR: SCORE: 1688.5

## 2020-11-09 ASSESSMENT — ACTIVITIES OF DAILY LIVING (ADL): CURRENT_FUNCTION: NO ASSISTANCE NEEDED

## 2020-11-09 NOTE — PATIENT INSTRUCTIONS
Please call your clinic of choice to schedule your cardiac stress test:    [facility]:   [phone]  [facility]:   [phone]  [facility]:  [phone]**  [facility]): [phone]  [facility]:   [phone]  [facility]:   [phone]  [facility]):  [phone]  [facility] [phone]

## 2020-11-09 NOTE — PROGRESS NOTES
"SUBJECTIVE:   Steve Herring is a 75 year old male who presents for Preventive Visit.      Patient has been advised of split billing requirements and indicates understanding: Yes     Are you in the first 12 months of your Medicare coverage?  No    Healthy Habits:     In general, how would you rate your overall health?  Fair    Frequency of exercise:  2-3 days/week    Duration of exercise:  15-30 minutes    Do you usually eat at least 4 servings of fruit and vegetables a day, include whole grains    & fiber and avoid regularly eating high fat or \"junk\" foods?  No    Taking medications regularly:  Yes    Medication side effects:  Not applicable    Ability to successfully perform activities of daily living:  No assistance needed    Home Safety:  Lack of grab bars in the bathroom    Hearing Impairment:  No hearing concerns    In the past 6 months, have you been bothered by leaking of urine?  No    In general, how would you rate your overall mental or emotional health?  Good      PHQ-2 Total Score: 0    Additional concerns today:  No    Do you feel safe in your environment? Yes    Have you ever done Advance Care Planning? (For example, a Health Directive, POLST, or a discussion with a medical provider or your loved ones about your wishes): Yes, patient states has an Advance Care Planning document and will bring a copy to the clinic.      Fall risk  Fallen 2 or more times in the past year?: No  Any fall with injury in the past year?: No    Cognitive Screening   1) Repeat 3 items (Leader, Season, Table)    2) Clock draw: NORMAL  3) 3 item recall: Recalls 3 objects  Results: 3 items recalled: COGNITIVE IMPAIRMENT LESS LIKELY    Mini-CogTM Copyright EBONY Cormier. Licensed by the author for use in [facility]; reprinted with permission (noble@.[facility]). All rights reserved.      Do you have sleep apnea, excessive snoring or daytime drowsiness?: yes      Social History     Tobacco Use     Smoking status: Never Smoker     " Smokeless tobacco: Never Used   Substance Use Topics     Alcohol use: Yes     Comment: seldom       Alcohol Use 11/8/2020   Prescreen: >3 drinks/day or >7 drinks/week? Not Applicable   Prescreen: >3 drinks/day or >7 drinks/week? -       Hyperlipidemia Follow-Up      Are you regularly taking any medication or supplement to lower your cholesterol?   Yes- simvastatin    Are you having muscle aches or other side effects that you think could be caused by your cholesterol lowering medication?  No    Hypertension Follow-up      Do you check your blood pressure regularly outside of the clinic? Yes     Are you following a low salt diet? No added salt    Are your blood pressures ever more than 140 on the top number (systolic) OR more   than 90 on the bottom number (diastolic), for example 140/90? No      Current providers sharing in care for this patient include:   Patient Care Team:  Mango Gilliland MD as PCP - General  Mango Gilliland MD as Assigned PCP    The following health maintenance items are reviewed in Epic and correct as of today:  Health Maintenance   Topic Date Due     FALL RISK ASSESSMENT  07/29/2020     MEDICARE ANNUAL WELLNESS VISIT  11/09/2021     LIPID  04/09/2022     COLORECTAL CANCER SCREENING  08/14/2022     ADVANCE CARE PLANNING  11/09/2025     DTAP/TDAP/TD IMMUNIZATION (4 - Td) 12/05/2027     HEPATITIS C SCREENING  Completed     PHQ-2  Completed     INFLUENZA VACCINE  Completed     Pneumococcal Vaccine: 65+ Years  Completed     ZOSTER IMMUNIZATION  Completed     AORTIC ANEURYSM SCREENING (SYSTEM ASSIGNED)  Completed     Pneumococcal Vaccine: Pediatrics (0 to 5 Years) and At-Risk Patients (6 to 64 Years)  Aged Out     IPV IMMUNIZATION  Aged Out     MENINGITIS IMMUNIZATION  Aged Out     HEPATITIS B IMMUNIZATION  Aged Out     Past medical, family, and social histories, medications, and allergies are reviewed and updated in Epic.     Review of Systems   Constitutional: Negative for chills and fever.  "  HENT: Negative for congestion, ear pain, hearing loss and sore throat.    Eyes: Negative for pain and visual disturbance.   Respiratory: Positive for cough. Negative for shortness of breath.    Cardiovascular: Positive for chest pain. Negative for palpitations and peripheral edema.   Gastrointestinal: Negative for abdominal pain, constipation, diarrhea, heartburn, hematochezia and nausea.   Genitourinary: Positive for impotence. Negative for discharge, dysuria, frequency, genital sores, hematuria and urgency.   Musculoskeletal: Positive for arthralgias and joint swelling. Negative for myalgias.   Skin: Negative for rash.   Neurological: Positive for weakness. Negative for dizziness, headaches and paresthesias.   Psychiatric/Behavioral: Negative for mood changes. The patient is not nervous/anxious.      CV: still gets brief heart palpitations with chest discomfort  GI: thinks he gets heartburn sometimes    OBJECTIVE:   /60 (BP Location: Left arm, Patient Position: Chair, Cuff Size: Adult Large)   Pulse 57   Temp 98.6  F (37  C) (Oral)   Resp 18   Ht 1.848 m (6' 0.75\")   Wt 89.4 kg (197 lb)   SpO2 97%   BMI 26.17 kg/m   Estimated body mass index is 26.17 kg/m  as calculated from the following:    Height as of this encounter: 1.848 m (6' 0.75\").    Weight as of this encounter: 89.4 kg (197 lb).  Physical Exam  GENERAL: healthy, alert and no distress  EYES: Eyes grossly normal to inspection, PERRL and conjunctivae and sclerae normal  HENT: ear canals and TM's normal, nose and mouth without ulcers or lesions  NECK: no adenopathy, no asymmetry, masses, or scars and thyroid normal to palpation  RESP: lungs clear to auscultation - no rales, rhonchi or wheezes  CV: regular rate and rhythm, normal S1 S2, no S3 or S4, no murmur, click or rub, no peripheral edema and peripheral pulses strong  ABDOMEN: soft, nontender, no hepatosplenomegaly, no masses and bowel sounds normal   (male): normal male genitalia " "without lesions or urethral discharge, no hernia  MS: no gross musculoskeletal defects noted, no edema  SKIN: no suspicious lesions or rashes  NEURO: Normal strength and tone, mentation intact and speech normal  PSYCH: mentation appears normal, affect normal/bright    Diagnostic Test Results:  Labs reviewed in Epic    ASSESSMENT / PLAN:   (Z00.00) Encounter for Medicare annual wellness exam  (primary encounter diagnosis)  Comment: Negative screening exam; up-to-date on preventive services.   Plan: f/u 1 year    (E78.5) Hyperlipidemia LDL goal <130  Comment: fasting  Plan: simvastatin (ZOCOR) 20 MG tablet, Lipid panel         reflex to direct LDL Non-fasting, ALT        Return in about 6 months (around 5/9/2021) for cholesterol, blood pressure check.      (I10) Essential hypertension with goal blood pressure less than 140/90  Comment: well controlled  Plan: lisinopril (ZESTRIL) 10 MG tablet, Creatinine,         Potassium        Return in about 6 months (around 5/9/2021) for cholesterol, blood pressure check.      (R07.9) Chest pain, unspecified type  (R00.2) Palpitations  Comment:   Plan: Echocardiogram Exercise Stress          (N52.9) Erectile dysfunction, unspecified erectile dysfunction type  Comment: prescription update   Plan: sildenafil (REVATIO) 20 MG tablet          (Z86.010) History of adenomatous polyp of colon  Comment:   Plan: GASTROENTEROLOGY ADULT REF PROCEDURE ONLY            COUNSELING:  Reviewed preventive health counseling, as reflected in patient instructions  Special attention given to:       Regular exercise       Colon cancer screening       Prostate cancer screening    Estimated body mass index is 26.17 kg/m  as calculated from the following:    Height as of this encounter: 1.848 m (6' 0.75\").    Weight as of this encounter: 89.4 kg (197 lb).    Weight management plan: normally goes to the gym regularly, but has exercised less due to the pandemic    He reports that he has never smoked. He has " never used smokeless tobacco.      Appropriate preventive services were discussed with this patient, including applicable screening as appropriate for cardiovascular disease, diabetes, osteopenia/osteoporosis, and glaucoma.  As appropriate for age/gender, discussed screening for colorectal cancer, prostate cancer, breast cancer, and cervical cancer. Checklist reviewing preventive services available has been given to the patient.    Reviewed patients plan of care and provided an AVS. The Basic Care Plan (routine screening as documented in Health Maintenance) for Steve meets the Care Plan requirement. This Care Plan has been established and reviewed with the Patient.    Counseling Resources:  ATP IV Guidelines  Pooled Cohorts Equation Calculator  Breast Cancer Risk Calculator  Breast Cancer: Medication to Reduce Risk  FRAX Risk Assessment  ICSI Preventive Guidelines  Dietary Guidelines for Americans, 2010  USDA's MyPlate  ASA Prophylaxis  Lung CA Screening    Mango Gilliland MD  [facility]    Identified Health Risks:

## 2020-12-09 DIAGNOSIS — E78.5 HYPERLIPIDEMIA LDL GOAL <130: ICD-10-CM

## 2020-12-10 NOTE — PROGRESS NOTES
[facility] Cardiology Consultation:         Assessment and Plan:     1. Paroxysmal atrial fibrillation/flutter: [id number] score of 2.  Plan:  - TTE (prefers to do it after new year's).  - Metoprolol succinate 25 mg daily  - Patient prefers to start anticoagulation (Eliquis likely) after he does his TTE after new year's. He understands the risk of stroke in the meantime.    2. Hypertension -- BP elevated in the office today, well controlled at home as by patient.  Continue lisinopril and start metoprolol for atrial fibrillation as above.    3. Hyperlipidemia (last LDL 74 in 11/2020, total cholesterol 146, )  Continue simvastatin 20    4. Mild ADELA-- not on CPAP. Patient said had improved sleep.     Case  discussed with Dr. Jorgensen    [facility] Cardiovascular Division    I have seen and examined the patient, reviewed labs and tests. I have discussed my findings and treatment recommendations with the house staff and/or Cardiology fellow and agree with their assessment and plan as outlined in the note.    Martin Jorgensen MD    Cardiac Imaging and Prevention  [facility]  Pager: [phone]        Reason for consultation: atrial fibrillation with RVR, asymptomatic      HPI: Steve Herring is a 75 year old year old male who is here to establish care.     His medical history is relevant for hypertension, hyperlipidemia (last LDL 74 in 11/2020, total cholesterol 146, ), OA and ?mild ADELA (not on CPAP)    On 12/8/2020, the patient was supposed to get a scheduled exercise stress echocardiogram.  He was found to be in asymptomatic atrial fibrillation with RVR and he was referred to cardiology.    The patient himself endorses occasional slow irregular palpitations for the last 15 years: he also endorses a sensation of forceful heart beat after his irregular beats.    Otherwise, he is doing well.  He denies having chest pain, shortness of breath, orthopnea,  cough, edema, PND, lightheadedness or syncope.  No significant weight gain or loss. Memory is not impaired.      EXAM:  BP (!) 153/81 (BP Location: Left arm, Patient Position: Sitting, Cuff Size: Adult Regular)   Pulse 92   Wt 86.8 kg (191 lb 4.8 oz)   SpO2 94%   BMI 25.41 kg/m    GEN/CONSTITUIONAL: Appears comfortable, in no apparent distress   EYES: No icterus  ENT/MOUTH: Normal  JVP:  Not visible  RESPIRATORY: Clear to auscultation bilaterally   CARDIOVASCULAR: Regular S1 and S2, no murmurs, rubs, or gallops.   ABDOMEN: Soft, non-tender, positive bowel sounds   NEUROLOGIC: Grossly non-focal   PSYCHIATRIC: Normal affect  EXT: No cyanosis, clubbing, edema. Normal pedal pulses.  Skin: No petechiae, purpura or rash    PAST MEDICAL HISTORY:  Past Medical History:   Diagnosis Date     Arthritis 2010     Colon polyps 2003    colonoscopy due Jan 2012     Dupuytren's contracture ~2000    RT>LT     Erectile dysfunction      Essential hypertension, benign 2003     Hyperlipidemia LDL goal <130      MMT (medial meniscus tear) 2004    RT       CURRENT MEDICATIONS:  Current Outpatient Medications   Medication     IBUPROFEN 200 MG PO TABS     lisinopril (ZESTRIL) 10 MG tablet     metoprolol succinate ER (TOPROL-XL) 25 MG 24 hr tablet     sildenafil (REVATIO) 20 MG tablet     simvastatin (ZOCOR) 20 MG tablet     No current facility-administered medications for this visit.        PAST SURGICAL HISTORY:  Past Surgical History:   Procedure Laterality Date     CATARACT IOL, RT/LT  12/18/08    RT     CATARACT IOL, RT/LT  11/13/13    LT, Dr. Carvajal     COLONOSCOPY       HC PNEUMATIC RETINOPEXY  3/17/05    Dr. Steve Hebert     HC REMOVE TONSILS/ADENOIDS,12+ Y/O         ALLERGIES     Allergies   Allergen Reactions     Amoxicillin Hives     Also angioedema       FAMILY HISTORY:  Family History   Problem Relation Age of Onset     Diabetes Mother         elderly Type 2 borderline     C.A.D. Father         stented     Hypertension  Father      Melanoma Son      Heart Disease Son         ablation for reentrant tachycardia     Esophageal Cancer Brother 66     Breast Cancer Sister 40     Cancer - colorectal No family hx of      Anesthesia Reaction No family hx of      Blood Disease No family hx of      Thrombophilia No family hx of        SOCIAL HISTORY:  Social History     Socioeconomic History     Marital status:      Spouse name: Not on file     Number of children: 2     Years of education: Not on file     Highest education level: Not on file   Occupational History     Employer: [employer]   Social Needs     Financial resource strain: Not on file     Food insecurity     Worry: Not on file     Inability: Not on file     Transportation needs     Medical: Not on file     Non-medical: Not on file   Tobacco Use     Smoking status: Never Smoker     Smokeless tobacco: Never Used   Substance and Sexual Activity     Alcohol use: Yes     Comment: seldom     Drug use: No     Sexual activity: Yes     Partners: Female     Birth control/protection: None   Lifestyle     Physical activity     Days per week: Not on file     Minutes per session: Not on file     Stress: Not on file   Relationships     Social connections     Talks on phone: Not on file     Gets together: Not on file     Attends Rastafarian service: Not on file     Active member of club or organization: Not on file     Attends meetings of clubs or organizations: Not on file     Relationship status: Not on file     Intimate partner violence     Fear of current or ex partner: Not on file     Emotionally abused: Not on file     Physically abused: Not on file     Forced sexual activity: Not on file   Other Topics Concern     Parent/sibling w/ CABG, MI or angioplasty before 65F 55M? No   Social History Narrative     Not on file       ROS:   Constitutional: No fever, chills, or sweats. No weight gain/loss   ENT: No visual disturbance, ear ache, epistaxis, sore  throat  Allergies/Immunologic: Negative.   Respiratory: No cough, hemoptysia  Cardiovascular: As per HPI  GI: No nausea, vomiting, hematemesis, melena, or hematochezia  : No urinary frequency, dysuria, or hematuria  Integument: +easy bruising  Psychiatric: Negative  Neuro: Negative  Endocrinology: Negative   Musculoskeletal: Negative      ADDITIONAL COMMENTS:       I reviewed the patient's medications    I reviewed the patient's pertinent clinical laboratory studies:       CBC RESULTS:   Recent Labs   Lab Test 07/29/19  1637   WBC 8.3   RBC 4.43   HGB 14.6   HCT 43.1   MCV 97   MCH 33.0   MCHC 33.9   RDW 12.2   *       Last Comprehensive Metabolic Panel:  Sodium   Date Value Ref Range Status   04/09/2020 141 133 - 144 mmol/L Final     Potassium   Date Value Ref Range Status   11/09/2020 4.3 3.4 - 5.3 mmol/L Final     Chloride   Date Value Ref Range Status   04/09/2020 108 94 - 109 mmol/L Final     Carbon Dioxide   Date Value Ref Range Status   04/09/2020 30 20 - 32 mmol/L Final     Anion Gap   Date Value Ref Range Status   04/09/2020 3 3 - 14 mmol/L Final     Glucose   Date Value Ref Range Status   04/09/2020 106 (H) 70 - 99 mg/dL Final     Comment:     Fasting specimen     Urea Nitrogen   Date Value Ref Range Status   04/09/2020 25 7 - 30 mg/dL Final     Creatinine   Date Value Ref Range Status   11/09/2020 1.08 0.66 - 1.25 mg/dL Final     GFR Estimate   Date Value Ref Range Status   11/09/2020 68 >60 mL/min/[1.73_m2] Final     Comment:     Non  GFR Calc  Starting 12/18/2018, serum creatinine based estimated GFR (eGFR) will be   calculated using the Chronic Kidney Disease Epidemiology Collaboration   (CKD-EPI) equation.       Calcium   Date Value Ref Range Status   04/09/2020 9.6 8.5 - 10.1 mg/dL Final       No results found for: AST  Lab Results   Component Value Date    ALT 18 11/09/2020     No results found for: BILICONJ   No results found for: BILITOTAL  No results found for:  ALBUMIN  No results found for: PROTTOTAL   No results found for: ALKPHOS      No results found for: TROPI    No results found for: INR     No results found for: TSH    Cholesterol   Date Value Ref Range Status   11/09/2020 146 <200 mg/dL Final   04/09/2020 135 <200 mg/dL Final     HDL Cholesterol   Date Value Ref Range Status   11/09/2020 48 >39 mg/dL Final   04/09/2020 44 >39 mg/dL Final     LDL Cholesterol Calculated   Date Value Ref Range Status   11/09/2020 74 <100 mg/dL Final     Comment:     Desirable:       <100 mg/dl   04/09/2020 61 <100 mg/dL Final     Comment:     Desirable:       <100 mg/dl     Triglycerides   Date Value Ref Range Status   11/09/2020 121 <150 mg/dL Final     Comment:     Fasting specimen   04/09/2020 148 <150 mg/dL Final     Comment:     Fasting specimen     Cholesterol/HDL Ratio   Date Value Ref Range Status   10/27/2015 3.3 0.0 - 5.0 Final   04/21/2015 3.7 0.0 - 5.0 Final         I reviewed the patient's pertinent imaging studies        I reviewed the patient's ECG:    EKG 12/11/2020      Atrial flutter with 2:1 AV block prior to the echocardiogram exercise stress 12/8/2020        Conrado Duong MD  PGY-4 cardiology fellow  [facility]  [email or web address] Pager: [phone]

## 2020-12-11 ENCOUNTER — OFFICE VISIT (OUTPATIENT)
Dept: CARDIOLOGY | Facility: CLINIC | Age: 75
End: 2020-12-11
Payer: COMMERCIAL

## 2020-12-11 VITALS
BODY MASS INDEX: 25.41 KG/M2 | OXYGEN SATURATION: 94 % | DIASTOLIC BLOOD PRESSURE: 81 MMHG | HEART RATE: 92 BPM | SYSTOLIC BLOOD PRESSURE: 153 MMHG | WEIGHT: 191.3 LBS

## 2020-12-11 DIAGNOSIS — I48.0 PAROXYSMAL ATRIAL FIBRILLATION (H): Primary | ICD-10-CM

## 2020-12-11 PROCEDURE — 99204 OFFICE O/P NEW MOD 45 MIN: CPT | Mod: GC | Performed by: INTERNAL MEDICINE

## 2020-12-11 PROCEDURE — 93000 ELECTROCARDIOGRAM COMPLETE: CPT | Performed by: INTERNAL MEDICINE

## 2020-12-11 RX ORDER — SIMVASTATIN 20 MG
20 TABLET ORAL EVERY EVENING
Qty: 90 TABLET | Refills: 1 | OUTPATIENT
Start: 2020-12-11

## 2020-12-11 RX ORDER — METOPROLOL SUCCINATE 25 MG/1
25 TABLET, EXTENDED RELEASE ORAL DAILY
Qty: 90 TABLET | Refills: 3 | Status: SHIPPED | OUTPATIENT
Start: 2020-12-11 | End: 2021-02-04

## 2020-12-11 ASSESSMENT — PAIN SCALES - GENERAL: PAINLEVEL: NO PAIN (0)

## 2020-12-11 NOTE — NURSING NOTE
Steve Herring's goals for this visit include:   Chief Complaint   Patient presents with     Consult       He requests these members of his care team be copied on today's visit information: no    PCP: Mango Gilliland    Referring Provider:  No referring provider defined for this encounter.    BP (!) 153/81 (BP Location: Left arm, Patient Position: Sitting, Cuff Size: Adult Regular)   Pulse 92   Wt 86.8 kg (191 lb 4.8 oz)   SpO2 94%   BMI 25.41 kg/m      Do you need any medication refills at today's visit? No    Ruma Orr CMA......December 11, 2020     2:58 PM

## 2020-12-11 NOTE — PATIENT INSTRUCTIONS
The following is a summary of your office visit today:    Start taking metoprolol succinate ER (TOPROL-XL) 25 MG 24 hr tablet, Take 1 tablet (25 mg) by mouth daily.    Nurse contact information: Milly Hanks RN  Cardiology Care Coordinator  [phone] Phone      [phone] Fax         Your Cardiology Team at [facility]  RN Care Coordinator: Milly  CMA: Ruma

## 2020-12-11 NOTE — TELEPHONE ENCOUNTER
"  Requested Prescriptions   Pending Prescriptions Disp Refills     simvastatin (ZOCOR) 20 MG tablet [Pharmacy Med Name: SIMVASTATIN 20 MG TABLET] 90 tablet 1     Sig: TAKE 1 TABLET (20 MG) BY MOUTH EVERY EVENING FOR CHOLESTEROL.   11/9/2020    Statins Protocol Passed - 12/9/2020 10:54 AM        Passed - LDL on file in past 12 months     Recent Labs   Lab Test 11/09/20  0906   LDL 74           Passed - No abnormal creatine kinase in past 12 months     No lab results found.           Passed - Recent (12 mo) or future (30 days) visit within the authorizing provider's specialty     Patient has had an office visit with the authorizing provider or a provider within the authorizing providers department within the previous 12 mos or has a future within next 30 days. See \"Patient Info\" tab in inbasket, or \"Choose Columns\" in Meds & Orders section of the refill encounter.            Passed - Medication is active on med list        Passed - Patient is age 18 or older         Denied  Sent 11/9/2020 for 3 months and 1 refill to this pharmacy  Katerin Peraza RN      "

## 2020-12-14 DIAGNOSIS — E78.5 HYPERLIPIDEMIA LDL GOAL <130: ICD-10-CM

## 2020-12-14 NOTE — TELEPHONE ENCOUNTER
Reason for Call:  Other prescription    Detailed comments: Pharmacy calling to follow up on medication request for Pt is out of Rx and would like for Provider to send in Rx's to Pharmacy as soon as possible.    Phone Number Pharmacy  can be reached at: [phone]    Best Time: anytime    Can we leave a detailed message on this number? YES    Call taken on 12/14/2020 at 9:48 AM by Rey Melton

## 2020-12-16 ENCOUNTER — MYC REFILL (OUTPATIENT)
Dept: FAMILY MEDICINE | Facility: CLINIC | Age: 75
End: 2020-12-16

## 2020-12-16 DIAGNOSIS — E78.5 HYPERLIPIDEMIA LDL GOAL <130: ICD-10-CM

## 2020-12-16 RX ORDER — SIMVASTATIN 20 MG
20 TABLET ORAL EVERY EVENING
Qty: 90 TABLET | Refills: 1
Start: 2020-12-16

## 2020-12-16 NOTE — TELEPHONE ENCOUNTER
90 day supply with 1 refill sent on 11/9/2020. Should have refill on file.       Aaron Quesada RN, BSN, PHN

## 2020-12-18 RX ORDER — SIMVASTATIN 20 MG
20 TABLET ORAL EVERY EVENING
Qty: 90 TABLET | Refills: 1 | Status: SHIPPED | OUTPATIENT
Start: 2020-12-18 | End: 2021-06-08

## 2020-12-18 NOTE — TELEPHONE ENCOUNTER
Pharmacy states they did not receive this rx, needs it resent asap.  Patient very upset.    simvastatin (ZOCOR) 20 MG tablet 90 tablet 1 11/9/2020

## 2021-01-08 ENCOUNTER — ANCILLARY PROCEDURE (OUTPATIENT)
Dept: CARDIOLOGY | Facility: CLINIC | Age: 76
End: 2021-01-08
Attending: INTERNAL MEDICINE
Payer: COMMERCIAL

## 2021-01-08 DIAGNOSIS — I48.0 PAROXYSMAL ATRIAL FIBRILLATION (H): Primary | ICD-10-CM

## 2021-01-08 PROCEDURE — 93306 TTE W/DOPPLER COMPLETE: CPT | Performed by: INTERNAL MEDICINE

## 2021-01-08 RX ADMIN — Medication 4 ML: at 10:30

## 2021-01-25 ENCOUNTER — TELEPHONE (OUTPATIENT)
Dept: CARDIOLOGY | Facility: CLINIC | Age: 76
End: 2021-01-25

## 2021-01-25 NOTE — TELEPHONE ENCOUNTER
[facility] Call Center    Phone Message    May a detailed message be left on voicemail: no     Reason for Call: Other: Pt has colonoscopy scheduled on 2.17.21. Asking to withhold Xarelto for 3 days prior to his colonoscopy.  Please call.      Action Taken: Message routed to:  Adult Clinics: Cardiology p [id number]    Travel Screening:

## 2021-01-26 NOTE — TELEPHONE ENCOUNTER
Called and left message at [facility] that patient is ok to hold Xarelto for 3 days prior to colonoscopy, resume after. No bridging is necessary.     JOURDAN Salguero

## 2021-02-04 ENCOUNTER — OFFICE VISIT (OUTPATIENT)
Dept: FAMILY MEDICINE | Facility: CLINIC | Age: 76
End: 2021-02-04
Payer: COMMERCIAL

## 2021-02-04 VITALS
TEMPERATURE: 98.6 F | HEART RATE: 106 BPM | DIASTOLIC BLOOD PRESSURE: 81 MMHG | HEIGHT: 73 IN | OXYGEN SATURATION: 98 % | BODY MASS INDEX: 25.75 KG/M2 | SYSTOLIC BLOOD PRESSURE: 132 MMHG | WEIGHT: 194.31 LBS

## 2021-02-04 DIAGNOSIS — M70.22 OLECRANON BURSITIS OF LEFT ELBOW: Primary | ICD-10-CM

## 2021-02-04 DIAGNOSIS — M72.0 DUPUYTREN'S CONTRACTURE OF RIGHT HAND: ICD-10-CM

## 2021-02-04 PROCEDURE — 99214 OFFICE O/P EST MOD 30 MIN: CPT | Performed by: FAMILY MEDICINE

## 2021-02-04 ASSESSMENT — PAIN SCALES - GENERAL: PAINLEVEL: NO PAIN (0)

## 2021-02-04 ASSESSMENT — MIFFLIN-ST. JEOR: SCORE: 1676.31

## 2021-02-04 NOTE — PATIENT INSTRUCTIONS
Patient Education     Understanding Dupuytren Contracture    Dupuytren contracture is a disease that occurs when the fibrous tissue beneath the skin of the palm and fingers thickens. This tissue is called the palmar fascia. If the disease gets worse, it can cause small hard knots (nodules) to form under the skin. Hard bands (cords) of tissue can also form. Over time, your fingers may curl and bend toward the palm. This effect is called contracture. This can make it hard to straighten your fingers.    How to say it  [id number] con-TRAK-nohemi   What causes Dupuytren contracture?  Doctors don t know the exact cause of Dupuytren contracture. It may run in families, especially those of Northern  descent. The disease is more common in adults older than 50. It's also more common in men than in women.   Symptoms of Dupuytren contracture  Symptoms for Dupuytren contracture tend to develop slowly. They may include:    Pitted, dimpled, or  puckered  skin over the palm    Hard lumps that form on the palm. Sometimes, the lumps are tender at first.    Scar-like bands that form across the palm    Fingers that bend toward the palm. The ring and little fingers are most often affected.    You are not able to place the palm flat on a surface    You have trouble holding or grasping objects    Hand pain (less common)  Treating Dupuytren contracture  Treatment for Dupuytren contracture depends on how serious your symptoms are. Treatment can t cure the disease. But it can help reduce symptoms or make it easier to move your fingers. Treatments may include:     Shots of medicine. These usually are anti-inflammatory medicines called corticosteroids. These shots may help relieve symptoms and reduce the size of nodules.    Enzyme shots. These may be used to break up the thickened tissue. This helps reduce contracture. It may allow your fingers to straighten again.    Needle aponeurotomy. Shallow needle punctures are made through  the skin to break up the thickened issue. This helps reduce contracture. It may allow your fingers to straighten again.    Hand exercises. These are often prescribed along with other treatments. They may help stretch and improve the range of motion in the hand and fingers.    Surgery. Various surgical techniques may be used to remove some of the thickened tissue in the palm. This helps reduce contracture. It also improves normal finger motion and hand function.  Possible complications of Dupuytren contracture  In some people, the contracture in the hand may get worse over time. This can lead to joint stiffness, deformity, and reduced function of the hand.   When to call your healthcare provider  Call your healthcare provider right away if you have any of these:    Fever of 100.4 F (38 C) or higher, or as directed by your provider    Chills    Symptoms that don t get better with treatment, or get worse    New symptoms  Dorie last reviewed this educational content on 12/1/2019 2000-2020 [employer]. [address] [place] [address]. All rights reserved. This information is not intended as a substitute for professional medical care. Always follow your healthcare professional's instructions.           Patient Education     Treating Dupuytren Contracture    Dupuytren contracture may require no treatment if your symptoms are mild. If your symptoms are more severe or they worsen, you may need treatment. Steroid or enyzme injections can be done to weaken and disrupt the cords. Another option is surgery. Surgery may be performed to remove the affected tissue. Physical therapy is often required afterwards to get the best results. Recovery may take several months. Your healthcare provider may suggest surgery if use of your hand is sharply limited.  Your surgery experience  Surgery removes some of the palmar fascia. This can take a few hours. You may be awake, but drowsy, during surgery. Or, you may  have general anesthesia (where you  sleep ). Your healthcare provider may use a zigzag-shaped incision to reach the fascia. A zigzag allows better healing and finger motion. When surgery is complete, part of your incision may be left open to help drainage. As you heal, it will close on its own. A thick bandage or cast will be placed over your hand and forearm. You most likely will go home the day of surgery.  After surgery  In the first few days, keep your hand elevated above your heart to reduce swelling and pain. And take any pain pills your healthcare provider prescribed. If you re asked to use ice, follow your healthcare provider s advice. In about a week, your stitches will be removed. You then may need to wear a splint. Soon, you ll start hand therapy and exercises that can help you heal.  Risks and complications  Your healthcare provider will give you details about the possible risks and complications of surgery. These may include:    Stiff fingers    Thick scarring on palm    Numbness in hand    Swelling around finger joints    Impaired blood flow to hand    Long-term pain or permanent stiffness in hand (rare)    Infection  Dorie last reviewed this educational content on 2/1/2018 2000-2020 [employer]. [address] [place] [address], [place] [address]. All rights reserved. This information is not intended as a substitute for professional medical care. Always follow your healthcare professional's instructions.

## 2021-02-04 NOTE — PROGRESS NOTES
"  Assessment & Plan     Olecranon bursitis of left elbow  Unknown trigger  - Orthopedic & Spine  Referral; Future  Handout provided.  I gave him 2 strips of Tubigrip to try to use to gradually resolve this.  If the patient wants a procedure done for definitive treatment (which I do not recommend), he can the orthopedist.  Dupuytren's contracture of right hand  Progressing  - Orthopedic & Spine  Referral; Future  - [facility] REFERRAL; Future        Return in about 3 months (around 5/10/2021) for cholesterol, blood pressure check.    Mango Gilliland MD  [facility] MACEY Wilson is a 75 year old who presents to clinic today for the following health issues     HPI       Musculoskeletal problem/pain  Onset/Duration: x2.5 wks  Description  Location: elbow - left  Joint Swelling: YES  Redness: no  Pain: no  Warmth: no  Intensity:  mild  Progression of Symptoms:  same  Accompanying signs and symptoms:   Fevers: no  Numbness/tingling/weakness: no  History  Trauma to the area: no  Recent illness:  no  Previous similar problem: no  Previous evaluation:  no  Precipitating or alleviating factors:  Aggravating factors include: none  Therapies tried and outcome: ice-didn't seem to do much        Review of Systems   CONSTITUTIONAL: NEGATIVE for fever, chills, change in weight  ENT/MOUTH: NEGATIVE for ear, mouth and throat problems  RESP: NEGATIVE for significant cough or SOB  CV: NEGATIVE for chest pain, palpitations or peripheral edema      Objective    /81 (BP Location: Left arm, Patient Position: Sitting, Cuff Size: Adult Regular)   Pulse 106   Temp 98.6  F (37  C) (Oral)   Ht 1.848 m (6' 0.75\")   Wt 88.1 kg (194 lb 5 oz)   SpO2 98%   BMI 25.81 kg/m    Body mass index is 25.81 kg/m .  Physical Exam   GENERAL: healthy, alert and no distress  EYES: Eyes grossly normal to inspection, PERRL, EOMI, sclerae white and conjunctivae normal  MS: " Swelling over the left olecranon, consistent with noninfectious olecranon bursitis.  No erythema warmth or tenderness.  Otherwise, no gross musculoskeletal defects noted, no edema  SKIN: no suspicious lesions or rashes to visible skin  NEURO: Normal strength and tone, sensory exam grossly normal, mentation intact, oriented times 3 and cranial nerves 2-12 intact  PSYCH: mentation appears normal, affect normal/bright

## 2021-02-17 ENCOUNTER — TRANSFERRED RECORDS (OUTPATIENT)
Dept: HEALTH INFORMATION MANAGEMENT | Facility: CLINIC | Age: 76
End: 2021-02-17

## 2021-03-03 ENCOUNTER — IMMUNIZATION (OUTPATIENT)
Dept: PEDIATRICS | Facility: CLINIC | Age: 76
End: 2021-03-03
Payer: COMMERCIAL

## 2021-03-03 PROCEDURE — 91300 PR COVID VAC PFIZER DIL RECON 30 MCG/0.3 ML IM: CPT

## 2021-03-03 PROCEDURE — 0001A PR COVID VAC PFIZER DIL RECON 30 MCG/0.3 ML IM: CPT

## 2021-03-17 DIAGNOSIS — I10 ESSENTIAL HYPERTENSION WITH GOAL BLOOD PRESSURE LESS THAN 140/90: ICD-10-CM

## 2021-03-21 RX ORDER — LISINOPRIL 10 MG/1
TABLET ORAL
Qty: 90 TABLET | Refills: 1 | Status: SHIPPED | OUTPATIENT
Start: 2021-03-21 | End: 2021-08-31

## 2021-03-21 NOTE — TELEPHONE ENCOUNTER
Prescription approved per [facility] Refill Protocol.    Alanis Lucero, RN, BSN, PHN  .[facility]

## 2021-03-24 ENCOUNTER — IMMUNIZATION (OUTPATIENT)
Dept: PEDIATRICS | Facility: CLINIC | Age: 76
End: 2021-03-24
Attending: INTERNAL MEDICINE
Payer: COMMERCIAL

## 2021-03-24 PROCEDURE — 91300 PR COVID VAC PFIZER DIL RECON 30 MCG/0.3 ML IM: CPT

## 2021-03-24 PROCEDURE — 0002A PR COVID VAC PFIZER DIL RECON 30 MCG/0.3 ML IM: CPT

## 2021-06-08 DIAGNOSIS — E78.5 HYPERLIPIDEMIA LDL GOAL <130: ICD-10-CM

## 2021-06-08 RX ORDER — SIMVASTATIN 20 MG
20 TABLET ORAL EVERY EVENING
Qty: 90 TABLET | Refills: 1 | Status: SHIPPED | OUTPATIENT
Start: 2021-06-08 | End: 2021-08-31

## 2021-08-09 NOTE — NURSING NOTE
"Chief Complaint   Patient presents with     Hypertension       Initial /64 (BP Location: Left arm, Patient Position: Chair, Cuff Size: Adult Regular)  Pulse 90  Temp 98.4  F (36.9  C) (Oral)  Ht 6' 1\" (1.854 m)  Wt 197 lb (89.4 kg)  SpO2 98%  BMI 25.99 kg/m2 Estimated body mass index is 25.99 kg/(m^2) as calculated from the following:    Height as of this encounter: 6' 1\" (1.854 m).    Weight as of this encounter: 197 lb (89.4 kg).  Medication Reconciliation: clifton Ramon MA      " no

## 2021-08-30 ENCOUNTER — TRANSFERRED RECORDS (OUTPATIENT)
Dept: HEALTH INFORMATION MANAGEMENT | Facility: CLINIC | Age: 76
End: 2021-08-30
Payer: COMMERCIAL

## 2021-08-31 ENCOUNTER — OFFICE VISIT (OUTPATIENT)
Dept: FAMILY MEDICINE | Facility: CLINIC | Age: 76
End: 2021-08-31
Payer: COMMERCIAL

## 2021-08-31 VITALS
DIASTOLIC BLOOD PRESSURE: 77 MMHG | BODY MASS INDEX: 24.18 KG/M2 | WEIGHT: 182.4 LBS | SYSTOLIC BLOOD PRESSURE: 138 MMHG | HEART RATE: 56 BPM | TEMPERATURE: 97.8 F | HEIGHT: 73 IN | OXYGEN SATURATION: 98 %

## 2021-08-31 DIAGNOSIS — R29.898 DECREASED RANGE OF MOTION OF NECK: ICD-10-CM

## 2021-08-31 DIAGNOSIS — M54.2 NECK PAIN: ICD-10-CM

## 2021-08-31 DIAGNOSIS — M62.81 GENERALIZED MUSCLE WEAKNESS: ICD-10-CM

## 2021-08-31 DIAGNOSIS — E78.5 HYPERLIPIDEMIA LDL GOAL <130: ICD-10-CM

## 2021-08-31 DIAGNOSIS — I10 ESSENTIAL HYPERTENSION WITH GOAL BLOOD PRESSURE LESS THAN 140/90: Primary | ICD-10-CM

## 2021-08-31 LAB
BASOPHILS # BLD AUTO: 0 10E3/UL (ref 0–0.2)
BASOPHILS NFR BLD AUTO: 0 %
EOSINOPHIL # BLD AUTO: 0 10E3/UL (ref 0–0.7)
EOSINOPHIL NFR BLD AUTO: 1 %
ERYTHROCYTE [DISTWIDTH] IN BLOOD BY AUTOMATED COUNT: 12.3 % (ref 10–15)
HCT VFR BLD AUTO: 41.6 % (ref 40–53)
HGB BLD-MCNC: 14.2 G/DL (ref 13.3–17.7)
IMM GRANULOCYTES # BLD: 0 10E3/UL
IMM GRANULOCYTES NFR BLD: 0 %
LYMPHOCYTES # BLD AUTO: 1.5 10E3/UL (ref 0.8–5.3)
LYMPHOCYTES NFR BLD AUTO: 23 %
MCH RBC QN AUTO: 32.6 PG (ref 26.5–33)
MCHC RBC AUTO-ENTMCNC: 34.1 G/DL (ref 31.5–36.5)
MCV RBC AUTO: 96 FL (ref 78–100)
MONOCYTES # BLD AUTO: 0.5 10E3/UL (ref 0–1.3)
MONOCYTES NFR BLD AUTO: 8 %
NEUTROPHILS # BLD AUTO: 4.5 10E3/UL (ref 1.6–8.3)
NEUTROPHILS NFR BLD AUTO: 68 %
PLATELET # BLD AUTO: 144 10E3/UL (ref 150–450)
RBC # BLD AUTO: 4.35 10E6/UL (ref 4.4–5.9)
WBC # BLD AUTO: 6.6 10E3/UL (ref 4–11)

## 2021-08-31 PROCEDURE — 80053 COMPREHEN METABOLIC PANEL: CPT | Performed by: FAMILY MEDICINE

## 2021-08-31 PROCEDURE — 80061 LIPID PANEL: CPT | Performed by: FAMILY MEDICINE

## 2021-08-31 PROCEDURE — 84443 ASSAY THYROID STIM HORMONE: CPT | Performed by: FAMILY MEDICINE

## 2021-08-31 PROCEDURE — 82550 ASSAY OF CK (CPK): CPT | Performed by: FAMILY MEDICINE

## 2021-08-31 PROCEDURE — 99214 OFFICE O/P EST MOD 30 MIN: CPT | Performed by: FAMILY MEDICINE

## 2021-08-31 PROCEDURE — 85025 COMPLETE CBC W/AUTO DIFF WBC: CPT | Performed by: FAMILY MEDICINE

## 2021-08-31 PROCEDURE — 36415 COLL VENOUS BLD VENIPUNCTURE: CPT | Performed by: FAMILY MEDICINE

## 2021-08-31 RX ORDER — LISINOPRIL 10 MG/1
10 TABLET ORAL DAILY
Qty: 90 TABLET | Refills: 1 | Status: SHIPPED | OUTPATIENT
Start: 2021-08-31 | End: 2022-04-04

## 2021-08-31 RX ORDER — SIMVASTATIN 20 MG
20 TABLET ORAL EVERY EVENING
Qty: 90 TABLET | Refills: 1 | Status: SHIPPED | OUTPATIENT
Start: 2021-08-31 | End: 2022-03-04

## 2021-08-31 ASSESSMENT — PAIN SCALES - GENERAL: PAINLEVEL: NO PAIN (0)

## 2021-08-31 ASSESSMENT — MIFFLIN-ST. JEOR: SCORE: 1622.27

## 2021-08-31 NOTE — PROGRESS NOTES
Assessment & Plan     (I10) Essential hypertension with goal blood pressure less than 140/90  (primary encounter diagnosis)  Comment: Well-controlled  Plan: lisinopril (ZESTRIL) 10 MG tablet,         Comprehensive metabolic panel (BMP + Alb, Alk         Phos, ALT, AST, Total. Bili, TP), CBC with         platelets and differential         Return in about 10 weeks (around 11/9/2021) for Medicare annual wellness exam.      (E78.5) Hyperlipidemia LDL goal <130  Comment: Historically at goal  Plan: simvastatin (ZOCOR) 20 MG tablet, TSH with free        T4 reflex, Lipid panel reflex to direct LDL         Non-fasting, Comprehensive metabolic panel (BMP        + Alb, Alk Phos, ALT, AST, Total. Bili, TP), CK        total            (M62.81) Generalized muscle weakness  Comment: Unknown cause and significance.  Could be normal for his age.  Plan: TSH with free T4 reflex, Comprehensive         metabolic panel (BMP + Alb, Alk Phos, ALT, AST,        Total. Bili, TP), CBC with platelets and         differential, CK total        Try holding simvastatin for 5 weeks, then resume for 5 weeks (see if this makes a difference, review at next appointment)    (M54.2) Neck pain  (R29.898) Decreased range of motion of neck  Comment:   Plan: [facility] Referral      Return in about 10 weeks (around 11/9/2021) for Medicare annual wellness exam.    Mango Gilliland MD  [facility]    Irina Wilson is a 75 year old who presents for the following health issues     HPI q    Hypertension Follow-up      Do you check your blood pressure regularly outside of the clinic? Yes     Are you following a low salt diet? Yes    Are your blood pressures ever more than 140 on the top number (systolic) OR more   than 90 on the bottom number (diastolic), for example 140/90? No          Neck Pain  Onset/Duration: Several months, perhaps up to a year  Description:   Location: Low posterior  Radiation: none  Intensity:  "Variable  Progression of Symptoms:  worsening perhaps   accompanying Signs & Symptoms: Clicking with rotation of the head  Burning, tingling, prickly sensation in arm(s): no  Numbness in arm(s): no  Weakness in arm(s):  no  Fever: no  Headache: no  Nausea and/or vomiting: no  History:   Trauma: no  Previous neck pain: no  Previous surgery or injections: no  Previous Imaging (MRI,X ray): no  Precipitating or alleviating factors: None  Does movement impact the pain:  YES  Therapies tried and outcome: nothing        Review of Systems   Constitutional, HEENT, cardiovascular, pulmonary, gi and gu systems are negative, except as otherwise noted.      Objective    /77 (BP Location: Left arm, Patient Position: Sitting, Cuff Size: Adult Regular)   Pulse 56   Temp 97.8  F (36.6  C) (Tympanic)   Ht 1.848 m (6' 0.75\")   Wt 82.7 kg (182 lb 6.4 oz)   SpO2 98%   BMI 24.23 kg/m    Body mass index is 24.23 kg/m .  Physical Exam   GENERAL: healthy, alert and no distress  EYES: Eyes grossly normal to inspection, PERRL, EOMI, sclerae white and conjunctivae normal  MS: no gross musculoskeletal defects noted, no edema  SKIN: no suspicious lesions or rashes to visible skin  NEURO: Normal strength and tone, sensory exam grossly normal, mentation intact, oriented times 3 and cranial nerves 2-12 intact  PSYCH: mentation appears normal, affect normal/bright     Office Visit on 11/09/2020   Component Date Value Ref Range Status     Cholesterol 11/09/2020 146  <200 mg/dL Final     Triglycerides 11/09/2020 121  <150 mg/dL Final    Fasting specimen     HDL Cholesterol 11/09/2020 48  >39 mg/dL Final     LDL Cholesterol Calculated 11/09/2020 74  <100 mg/dL Final    Desirable:       <100 mg/dl     Non HDL Cholesterol 11/09/2020 98  <130 mg/dL Final     ALT 11/09/2020 18  0 - 70 U/L Final     Creatinine 11/09/2020 1.08  0.66 - 1.25 mg/dL Final     GFR Estimate 11/09/2020 68  >60 mL/min/[1.73_m2] Final    Comment: Non  GFR " Calc  Starting 12/18/2018, serum creatinine based estimated GFR (eGFR) will be   calculated using the Chronic Kidney Disease Epidemiology Collaboration   (CKD-EPI) equation.       GFR Estimate If Black 11/09/2020 78  >60 mL/min/[1.73_m2] Final    Comment:  GFR Calc  Starting 12/18/2018, serum creatinine based estimated GFR (eGFR) will be   calculated using the Chronic Kidney Disease Epidemiology Collaboration   (CKD-EPI) equation.       Potassium 11/09/2020 4.3  3.4 - 5.3 mmol/L Final

## 2021-09-01 ENCOUNTER — MYC MEDICAL ADVICE (OUTPATIENT)
Dept: FAMILY MEDICINE | Facility: CLINIC | Age: 76
End: 2021-09-01
Payer: COMMERCIAL

## 2021-09-01 LAB
ALBUMIN SERPL-MCNC: 4.2 G/DL (ref 3.4–5)
ALP SERPL-CCNC: 54 U/L (ref 40–150)
ALT SERPL W P-5'-P-CCNC: 25 U/L (ref 0–70)
ANION GAP SERPL CALCULATED.3IONS-SCNC: 6 MMOL/L (ref 3–14)
AST SERPL W P-5'-P-CCNC: 17 U/L (ref 0–45)
BILIRUB SERPL-MCNC: 0.8 MG/DL (ref 0.2–1.3)
BUN SERPL-MCNC: 24 MG/DL (ref 7–30)
CALCIUM SERPL-MCNC: 9.3 MG/DL (ref 8.5–10.1)
CHLORIDE BLD-SCNC: 105 MMOL/L (ref 94–109)
CHOLEST SERPL-MCNC: 156 MG/DL
CK SERPL-CCNC: 231 U/L (ref 30–300)
CO2 SERPL-SCNC: 28 MMOL/L (ref 20–32)
CREAT SERPL-MCNC: 0.92 MG/DL (ref 0.66–1.25)
FASTING STATUS PATIENT QL REPORTED: YES
GFR SERPL CREATININE-BSD FRML MDRD: 82 ML/MIN/1.73M2
GLUCOSE BLD-MCNC: 85 MG/DL (ref 70–99)
HDLC SERPL-MCNC: 54 MG/DL
LDLC SERPL CALC-MCNC: 83 MG/DL
NONHDLC SERPL-MCNC: 102 MG/DL
POTASSIUM BLD-SCNC: 4.2 MMOL/L (ref 3.4–5.3)
PROT SERPL-MCNC: 7 G/DL (ref 6.8–8.8)
SODIUM SERPL-SCNC: 139 MMOL/L (ref 133–144)
TRIGL SERPL-MCNC: 97 MG/DL
TSH SERPL DL<=0.005 MIU/L-ACNC: 1.3 MU/L (ref 0.4–4)

## 2021-09-02 NOTE — TELEPHONE ENCOUNTER
"Dr. Gilliland;  Please advise.  Was there to have been a change with patient's Lisinopril dose/instructions?  The AVS medication list 8/31/21 states \"Change\" beside it and states \"see the new instructions\" next to What changed.  Ilene Amezcua RN    "

## 2021-09-15 ENCOUNTER — THERAPY VISIT (OUTPATIENT)
Dept: PHYSICAL THERAPY | Facility: CLINIC | Age: 76
End: 2021-09-15
Attending: FAMILY MEDICINE
Payer: COMMERCIAL

## 2021-09-15 DIAGNOSIS — R29.898 DECREASED RANGE OF MOTION OF NECK: ICD-10-CM

## 2021-09-15 DIAGNOSIS — M54.2 NECK PAIN: ICD-10-CM

## 2021-09-15 PROCEDURE — 97161 PT EVAL LOW COMPLEX 20 MIN: CPT | Mod: GP | Performed by: PHYSICAL THERAPIST

## 2021-09-15 PROCEDURE — 97140 MANUAL THERAPY 1/> REGIONS: CPT | Mod: GP | Performed by: PHYSICAL THERAPIST

## 2021-09-15 PROCEDURE — 97110 THERAPEUTIC EXERCISES: CPT | Mod: GP | Performed by: PHYSICAL THERAPIST

## 2021-09-15 NOTE — PROGRESS NOTES
Physical Therapy Initial Evaluation  Subjective:  The history is provided by the patient.   Patient Health History  Steve Herring being seen for Neck.     Problem began: 8/31/2021 (consult w/PCP).   Problem occurred: Old age?   Pain is reported as 1/10 on pain scale.  General health as reported by patient is fair.  Pertinent medical history includes: heart problems, high blood pressure, sleep disorder/apnea, weakness and osteoarthritis.   Red flags:  None as reported by patient.  Medical allergies: other. Other medical allergies details: amoxicillin.   Surgeries include:  Other. Other surgery history details: Tonsils.    Current medications:  High blood pressure medication and other. Other medications details: Blood thinner, cholesterol.    Current occupation is Retired.   Primary job tasks include:  Driving, prolonged sitting and other.   Other job/home tasks details: Yard work.                Therapist Generated HPI Evaluation  Problem details: Pt notes that he has had neck pain for a long time, and he did mention this to his PCP on 8/31/21 when he had a recheck. He thinks that this may be caused by reading more than usual with the pandemic restricting other activities..         Type of problem:  Cervical spine.    This is a chronic condition.  Condition occurred with:  Insidious onset.  Where condition occurred: for unknown reasons.  Patient reports pain:  Cervical left side and cervical right side.  Pain is described as aching and is intermittent.  Pain radiates to:  None. Pain is the same all the time.  Since onset symptoms are unchanged.  Associated symptoms:  Loss of motion/stiffness and ringing in ears (weakness in R>L shoulder/UE). Symptoms are exacerbated by lifting and looking up or down  and relieved by rest.      Barriers include:  None as reported by patient.                        Objective:  Standing Alignment:    Cervical/Thoracic:  Forward head (mild)  Shoulder/UE:  Rounded shoulders  (mild)                                  Cervical/Thoracic Evaluation    AROM:  AROM Cervical:    Flexion:            90% of NL  Extension:       50% w/head deviating to the L, when centered only 40%   Rotation:         Left: 50%     Right: 50%  Side Bend:      Left: 35%     Right:  25%      Headaches: none  Cervical Myotomes:  not assessed (pt has significant weakness in R UE d/t shoulder DJD and RC injury)                  DTR's:  not assessed          Cervical Dermatomes:  not assessed                    Cervical Palpation:    Tenderness present at Left:    Scalenes; Upper Trap; Levator and Suboccipitals  Tenderness not present at Left:   Erector Spinae  Tenderness present at Right:    Scalenes; Upper Trap; Levator; Erector Spinae and Suboccipitals      Spinal Segmental Conclusions:  Minimal to moderate restrictions in Cspine with PA mob's at all levels, worst at C6 and C7, no restrictions with side glides at all levels; no pain reported w/any of the mobilization.                                                  General     ROS    Assessment/Plan:    Patient is a 75 year old male with cervical complaints.    Patient has the following significant findings with corresponding treatment plan.                Diagnosis 1:  Chronic neck pain  Pain -  hot/cold therapy, mechanical traction, manual therapy, self management, education and home program  Decreased ROM/flexibility - manual therapy and therapeutic exercise  Decreased joint mobility - manual therapy and therapeutic exercise  Decreased strength - therapeutic exercise and therapeutic activities  Impaired muscle performance - neuro re-education  Decreased function - therapeutic activities  Impaired posture - neuro re-education and therapeutic activities    Therapy Evaluation Codes:   1) History comprised of:   Personal factors that impact the plan of care:      Age, Past/current experiences and Time since onset of symptoms.    Comorbidity factors that impact the  plan of care are:      Heart problems, High blood pressure, Osteoarthritis and Sleep disorder/apnea.     Medications impacting care: High blood pressure and Heparin/coumadin.  2) Examination of Body Systems comprised of:   Body structures and functions that impact the plan of care:      Cervical spine.   Activity limitations that impact the plan of care are:      Lifting and Reading/Computer work.  3) Clinical presentation characteristics are:   Stable/Uncomplicated.  4) Decision-Making    Low complexity using standardized patient assessment instrument and/or measureable assessment of functional outcome.  Cumulative Therapy Evaluation is: Low complexity.    Previous and current functional limitations:  (See Goal Flow Sheet for this information)    Short term and Long term goals: (See Goal Flow Sheet for this information)     Communication ability:  Patient appears to be able to clearly communicate and understand verbal and written communication and follow directions correctly.  Treatment Explanation - The following has been discussed with the patient:   RX ordered/plan of care  Anticipated outcomes  Possible risks and side effects  This patient would benefit from PT intervention to resume normal activities.   Rehab potential is good.    Frequency:  1 X week, once daily  Duration:  for 6 weeks  Discharge Plan:  Achieve all LTG.  Independent in home treatment program.  Reach maximal therapeutic benefit.    Please refer to the daily flowsheet for treatment today, total treatment time and time spent performing 1:1 timed codes.

## 2021-09-22 ENCOUNTER — TRANSFERRED RECORDS (OUTPATIENT)
Dept: HEALTH INFORMATION MANAGEMENT | Facility: CLINIC | Age: 76
End: 2021-09-22

## 2021-09-22 ENCOUNTER — THERAPY VISIT (OUTPATIENT)
Dept: PHYSICAL THERAPY | Facility: CLINIC | Age: 76
End: 2021-09-22
Payer: COMMERCIAL

## 2021-09-22 DIAGNOSIS — M54.2 NECK PAIN: ICD-10-CM

## 2021-09-22 PROCEDURE — 97140 MANUAL THERAPY 1/> REGIONS: CPT | Mod: GP

## 2021-09-22 PROCEDURE — 97110 THERAPEUTIC EXERCISES: CPT | Mod: GP

## 2021-09-29 ENCOUNTER — THERAPY VISIT (OUTPATIENT)
Dept: PHYSICAL THERAPY | Facility: CLINIC | Age: 76
End: 2021-09-29
Payer: COMMERCIAL

## 2021-09-29 DIAGNOSIS — M54.2 NECK PAIN: ICD-10-CM

## 2021-09-29 PROCEDURE — 97140 MANUAL THERAPY 1/> REGIONS: CPT | Mod: GP | Performed by: PHYSICAL THERAPIST

## 2021-09-29 PROCEDURE — 97110 THERAPEUTIC EXERCISES: CPT | Mod: GP | Performed by: PHYSICAL THERAPIST

## 2021-10-01 ENCOUNTER — TELEPHONE (OUTPATIENT)
Dept: FAMILY MEDICINE | Facility: CLINIC | Age: 76
End: 2021-10-01

## 2021-10-01 NOTE — TELEPHONE ENCOUNTER
Patient called to clinic to ask about his Simvastatin and if needs to restart this medication or not.  Was told to hold medication x 5 weeks back on 8/31/21 due to reports of generalized muscle weakness, low energy level, to see if Simvastatin was cause.  Patient has not taken medication since this date, however he is still experiencing the same symptoms. Has not had any improvement at all with stopping medication so is now wondering if should start back up again or what provider recommends.    Patient is wondering if could be due to his Xarelto instead? Feels like he started to notice these symptoms around same time he started on this medication, back in January.    Patient has a Pre-Op physical apt on 10/28/21 with PCP, notes he can discuss more with provider at that time if needed or can repeat labs if indicated.    Patient wanting to know about Simvastatin and if provider thinks could be the Xarelto instead.    Routing to provider to review and advise.      Destinee Pruitt RN  [facility]

## 2021-10-06 ENCOUNTER — THERAPY VISIT (OUTPATIENT)
Dept: PHYSICAL THERAPY | Facility: CLINIC | Age: 76
End: 2021-10-06
Payer: COMMERCIAL

## 2021-10-06 DIAGNOSIS — M54.2 NECK PAIN: ICD-10-CM

## 2021-10-06 PROCEDURE — 97110 THERAPEUTIC EXERCISES: CPT | Mod: GP | Performed by: PHYSICAL THERAPIST

## 2021-10-06 PROCEDURE — 97140 MANUAL THERAPY 1/> REGIONS: CPT | Mod: GP | Performed by: PHYSICAL THERAPIST

## 2021-10-07 ENCOUNTER — MYC MEDICAL ADVICE (OUTPATIENT)
Dept: FAMILY MEDICINE | Facility: CLINIC | Age: 76
End: 2021-10-07
Payer: COMMERCIAL

## 2021-10-11 ENCOUNTER — TRANSFERRED RECORDS (OUTPATIENT)
Dept: HEALTH INFORMATION MANAGEMENT | Facility: CLINIC | Age: 76
End: 2021-10-11
Payer: COMMERCIAL

## 2021-10-11 NOTE — TELEPHONE ENCOUNTER
Pt checking in on status of this question.     Pt has also sent a [employer] message with no response.    Pt stated this is not typical, for him to not get back to pt.    Pt is stating that he has been off the Simvastatin for six weeks now.    Pt is expecting a response soon, before next appointment. Please advise.    Yvette Ramires,   [facility]

## 2021-10-11 NOTE — TELEPHONE ENCOUNTER
Patient updated on the below, no questions at this time, verbalized understanding.    Kitty Amaya RN

## 2021-10-11 NOTE — TELEPHONE ENCOUNTER
Recommend restarting simvastatin and discussing further with Dr. Gilliland at upcoming appt.    Debra Martin M.D.

## 2021-10-20 ENCOUNTER — THERAPY VISIT (OUTPATIENT)
Dept: PHYSICAL THERAPY | Facility: CLINIC | Age: 76
End: 2021-10-20
Payer: COMMERCIAL

## 2021-10-20 DIAGNOSIS — M54.2 NECK PAIN: ICD-10-CM

## 2021-10-20 PROCEDURE — 97110 THERAPEUTIC EXERCISES: CPT | Mod: GP | Performed by: PHYSICAL THERAPIST

## 2021-10-20 PROCEDURE — 97140 MANUAL THERAPY 1/> REGIONS: CPT | Mod: GP | Performed by: PHYSICAL THERAPIST

## 2021-10-20 PROCEDURE — 97112 NEUROMUSCULAR REEDUCATION: CPT | Mod: GP | Performed by: PHYSICAL THERAPIST

## 2021-10-20 NOTE — LETTER
IBETH [facility]  [address]  [facility] [address]  [phone]    2021    Re: Steve Herring   :   1945  MRN:  [id number]   REFERRING PHYSICIAN:   MD IBETH Mcconnell [facility]  Date of Initial Evaluation:  10/20/2021  Visits:  Rxs Used:   Reason for Referral:  Neck pain    EVALUATION SUMMARY    PROGRESS  REPORT  Progress reporting period is from 9/15/21 to 10/20/21.       SUBJECTIVE  Subjective changes noted by patient:   Pt notes that his neck has been feeling worse since yesterday, any head movement will increase his PLs to 410.    Current pain level is 0/10.     Previous pain level was  2/10  .   Changes in function:  Yes (See Goal flowsheet attached for changes in current functional level)  Adverse reaction to treatment or activity: None    OBJECTIVE  Changes noted in objective findings:    CROM: flex WNL, ext 50% w/slight pain, SB R 40% w/slight pain, SB L 40% w/slight pain, rot R 50% and rot L 75% w/slight pain. Tight on R UT, cervical ES mm, knotted on L UT, tender on both sides.     ASSESSMENT/PLAN  Updated problem list and treatment plan: Diagnosis 1:  Cervical DJD  Pain -  hot/cold therapy, manual therapy, self management, education and home program  Decreased ROM/flexibility - manual therapy and therapeutic exercise  Decreased joint mobility - manual therapy and therapeutic exercise  Decreased strength - therapeutic exercise and therapeutic activities  Impaired muscle performance - neuro re-education  Decreased function - therapeutic activities  Impaired posture - neuro re-education and therapeutic activities  STG/LTGs have been met or progress has been made towards goals:  Yes (See Goal flow sheet completed today.)  Assessment of Progress: The patient's condition is improving.  The patient's condition has potential to improve.  Patient is meeting short term goals and is  progressing towards long term goals.  Self Management Plans:  Patient has been instructed in a home treatment program.  Patient  has been instructed in self management of symptoms.  I have re-evaluated this patient and find that the nature, scope, duration and intensity of   Re: Steve Herring   :   1945    the therapy is appropriate for the medical condition of the patient.  Steve continues to require the following intervention to meet STG and LTG's:  PT    Recommendations:  This patient would benefit from continued therapy.     Frequency:  1 X week, once daily  Duration:  for 2 weeks    Please refer to the daily flowsheet for treatment today, total treatment time and time spent performing 1:1 timed codes.      Thank you for your referral.    INQUIRIES  Therapist: ELISABETH Melgar   [facility]  [address]  [facility] [address]  Phone: [phone]  Fax: [phone]

## 2021-10-20 NOTE — PROGRESS NOTES
Subjective:  HPI  Physical Exam                    Objective:  System    Physical Exam    General     ROS    Assessment/Plan:    PROGRESS  REPORT    Progress reporting period is from 9/15/21 to 10/20/21.       SUBJECTIVE  Subjective changes noted by patient:   Pt notes that his neck has been feeling worse since yesterday, any head movement will increase his PLs to 410.    Current pain level is 0/10.     Previous pain level was  2/10  .   Changes in function:  Yes (See Goal flowsheet attached for changes in current functional level)  Adverse reaction to treatment or activity: None    OBJECTIVE  Changes noted in objective findings:    CROM: flex WNL, ext 50% w/slight pain, SB R 40% w/slight pain, SB L 40% w/slight pain, rot R 50% and rot L 75% w/slight pain. Tight on R UT, cervical ES mm, knotted on L UT, tender on both sides.     ASSESSMENT/PLAN  Updated problem list and treatment plan: Diagnosis 1:  Cervical DJD  Pain -  hot/cold therapy, manual therapy, self management, education and home program  Decreased ROM/flexibility - manual therapy and therapeutic exercise  Decreased joint mobility - manual therapy and therapeutic exercise  Decreased strength - therapeutic exercise and therapeutic activities  Impaired muscle performance - neuro re-education  Decreased function - therapeutic activities  Impaired posture - neuro re-education and therapeutic activities  STG/LTGs have been met or progress has been made towards goals:  Yes (See Goal flow sheet completed today.)  Assessment of Progress: The patient's condition is improving.  The patient's condition has potential to improve.  Patient is meeting short term goals and is progressing towards long term goals.  Self Management Plans:  Patient has been instructed in a home treatment program.  Patient  has been instructed in self management of symptoms.  I have re-evaluated this patient and find that the nature, scope, duration and intensity of the therapy is appropriate  for the medical condition of the patient.  Steve continues to require the following intervention to meet STG and LTG's:  PT    Recommendations:  This patient would benefit from continued therapy.     Frequency:  1 X week, once daily  Duration:  for 2 weeks        Please refer to the daily flowsheet for treatment today, total treatment time and time spent performing 1:1 timed codes.

## 2021-10-22 ENCOUNTER — TRANSFERRED RECORDS (OUTPATIENT)
Dept: HEALTH INFORMATION MANAGEMENT | Facility: CLINIC | Age: 76
End: 2021-10-22
Payer: COMMERCIAL

## 2021-10-25 ENCOUNTER — TRANSFERRED RECORDS (OUTPATIENT)
Dept: HEALTH INFORMATION MANAGEMENT | Facility: CLINIC | Age: 76
End: 2021-10-25
Payer: COMMERCIAL

## 2021-10-27 ENCOUNTER — THERAPY VISIT (OUTPATIENT)
Dept: PHYSICAL THERAPY | Facility: CLINIC | Age: 76
End: 2021-10-27
Payer: COMMERCIAL

## 2021-10-27 DIAGNOSIS — M54.2 NECK PAIN: ICD-10-CM

## 2021-10-27 PROCEDURE — 97110 THERAPEUTIC EXERCISES: CPT | Mod: GP

## 2021-10-27 PROCEDURE — 97140 MANUAL THERAPY 1/> REGIONS: CPT | Mod: GP

## 2021-10-28 ENCOUNTER — IMMUNIZATION (OUTPATIENT)
Dept: NURSING | Facility: CLINIC | Age: 76
End: 2021-10-28
Payer: COMMERCIAL

## 2021-10-28 PROCEDURE — 91300 PR COVID VAC PFIZER DIL RECON 30 MCG/0.3 ML IM: CPT

## 2021-10-28 PROCEDURE — 0004A PR COVID VAC PFIZER DIL RECON 30 MCG/0.3 ML IM: CPT

## 2021-11-01 ENCOUNTER — TRANSFERRED RECORDS (OUTPATIENT)
Dept: HEALTH INFORMATION MANAGEMENT | Facility: CLINIC | Age: 76
End: 2021-11-01
Payer: COMMERCIAL

## 2021-11-03 ENCOUNTER — THERAPY VISIT (OUTPATIENT)
Dept: PHYSICAL THERAPY | Facility: CLINIC | Age: 76
End: 2021-11-03
Payer: COMMERCIAL

## 2021-11-03 DIAGNOSIS — M54.2 NECK PAIN: ICD-10-CM

## 2021-11-03 PROCEDURE — 97110 THERAPEUTIC EXERCISES: CPT | Mod: GP | Performed by: PHYSICAL THERAPIST

## 2021-11-03 PROCEDURE — 97140 MANUAL THERAPY 1/> REGIONS: CPT | Mod: GP | Performed by: PHYSICAL THERAPIST

## 2021-11-08 ENCOUNTER — MYC MEDICAL ADVICE (OUTPATIENT)
Dept: FAMILY MEDICINE | Facility: CLINIC | Age: 76
End: 2021-11-08
Payer: COMMERCIAL

## 2021-11-10 ENCOUNTER — THERAPY VISIT (OUTPATIENT)
Dept: PHYSICAL THERAPY | Facility: CLINIC | Age: 76
End: 2021-11-10
Payer: COMMERCIAL

## 2021-11-10 DIAGNOSIS — M54.2 NECK PAIN: ICD-10-CM

## 2021-11-10 PROCEDURE — 97110 THERAPEUTIC EXERCISES: CPT | Mod: GP | Performed by: PHYSICAL THERAPIST

## 2021-11-10 PROCEDURE — 97140 MANUAL THERAPY 1/> REGIONS: CPT | Mod: GP | Performed by: PHYSICAL THERAPIST

## 2021-11-10 NOTE — LETTER
IBETH [facility]  [address]  [facility] [address]  [phone]    2021    Re: Steve Herring   :   1945  MRN:  [id number]   REFERRING PHYSICIAN:   MD IBETH Mcconnell [facility]  Date of Initial Evaluation:  9/15/2021  Visits:  Rxs Used:   Reason for Referral:  Neck pain    EVALUATION SUMMARY    DISCHARGE REPORT  Progress reporting period is from 10/20/21 to 11/10/21.       SUBJECTIVE  Subjective changes noted by patient:   Pt states that his neck has been feeling fine, no pain even with reading or doing computer work. He is more limited with using his R arm d/t the shoulder.    Current pain level is 0/10 Current Pain level: 0/10.     Previous pain level was  0/10  .   Changes in function:  Yes (See Goal flowsheet attached for changes in current functional level)  Adverse reaction to treatment or activity: None    OBJECTIVE  Changes noted in objective findings:    CROM: flex 95%, ext 65%, SB R 40%, SB L 35%, rot R 75%, rot L 85% no pain with any movements. In supine, when raising R arm into flex, his R UT contracts almost exclusively, and when the UT muscle is manually inhibited, he cannot raise R arm as high up.     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  Self Management Plans:  Patient has been instructed in a home treatment program.  Patient is independent in a home treatment program.  Patient  has been instructed in self management of symptoms.  Patient is independent in self management of symptoms.  I have re-evaluated this patient and find that the nature, scope, duration and intensity of the  therapy is appropriate for the medical condition of the patient.  Steve continues to require the following intervention to meet STG and LTG's:  PT intervention is no longer required to meet STG/LTG.    Re: Steve Herring   :   1945    Recommendations:  This patient is ready to be discharged from therapy and continue their home treatment program.    Please refer to the daily flowsheet for treatment today, total treatment time and time spent performing 1:1 timed codes.      Thank you for your referral.    INQUIRIES  Therapist: Xin Cool [facility]  [address]  [facility] [address]  Phone: [phone]  Fax: [phone]

## 2021-11-10 NOTE — PROGRESS NOTES
Subjective:  HPI  Physical Exam                    Objective:  System    Physical Exam    General     ROS    Assessment/Plan:    DISCHARGE REPORT    Progress reporting period is from 10/20/21 to 11/10/21.       SUBJECTIVE  Subjective changes noted by patient:   Pt states that his neck has been feeling fine, no pain even with reading or doing computer work. He is more limited with using his R arm d/t the shoulder.    Current pain level is 0/10 Current Pain level: 0/10.     Previous pain level was  0/10  .   Changes in function:  Yes (See Goal flowsheet attached for changes in current functional level)  Adverse reaction to treatment or activity: None    OBJECTIVE  Changes noted in objective findings:    CROM: flex 95%, ext 65%, SB R 40%, SB L 35%, rot R 75%, rot L 85% no pain with any movements. In supine, when raising R arm into flex, his R UT contracts almost exclusively, and when the UT muscle is manually inhibited, he cannot raise R arm as high up.     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  Self Management Plans:  Patient has been instructed in a home treatment program.  Patient is independent in a home treatment program.  Patient  has been instructed in self management of symptoms.  Patient is independent in self management of symptoms.  I have re-evaluated this patient and find that the nature, scope, duration and intensity of the therapy is appropriate for the medical condition of the patient.  Steve continues to require the following intervention to meet STG and LTG's:  PT intervention is no longer required to meet STG/LTG.    Recommendations:  This patient is ready to be discharged from therapy and continue their  home treatment program.    Please refer to the daily flowsheet for treatment today, total treatment time and time spent performing 1:1 timed codes.

## 2021-11-18 ENCOUNTER — LAB (OUTPATIENT)
Dept: LAB | Facility: CLINIC | Age: 76
End: 2021-11-18
Attending: PSYCHIATRY & NEUROLOGY
Payer: COMMERCIAL

## 2021-11-18 ENCOUNTER — OFFICE VISIT (OUTPATIENT)
Dept: NEUROLOGY | Facility: CLINIC | Age: 76
End: 2021-11-18
Payer: COMMERCIAL

## 2021-11-18 ENCOUNTER — MEDICAL CORRESPONDENCE (OUTPATIENT)
Dept: HEALTH INFORMATION MANAGEMENT | Facility: CLINIC | Age: 76
End: 2021-11-18

## 2021-11-18 VITALS
HEART RATE: 93 BPM | WEIGHT: 181.4 LBS | RESPIRATION RATE: 16 BRPM | OXYGEN SATURATION: 96 % | SYSTOLIC BLOOD PRESSURE: 125 MMHG | HEIGHT: 73 IN | DIASTOLIC BLOOD PRESSURE: 84 MMHG | BODY MASS INDEX: 24.04 KG/M2

## 2021-11-18 DIAGNOSIS — M47.12 CERVICAL SPONDYLOSIS WITH MYELOPATHY: ICD-10-CM

## 2021-11-18 DIAGNOSIS — M47.12 CERVICAL SPONDYLOSIS WITH MYELOPATHY: Primary | ICD-10-CM

## 2021-11-18 LAB
CK SERPL-CCNC: 150 U/L (ref 30–300)
CRP SERPL-MCNC: <2.9 MG/L (ref 0–8)
ERYTHROCYTE [SEDIMENTATION RATE] IN BLOOD BY WESTERGREN METHOD: 8 MM/HR (ref 0–20)

## 2021-11-18 PROCEDURE — 36415 COLL VENOUS BLD VENIPUNCTURE: CPT | Performed by: PATHOLOGY

## 2021-11-18 PROCEDURE — 99204 OFFICE O/P NEW MOD 45 MIN: CPT | Performed by: PSYCHIATRY & NEUROLOGY

## 2021-11-18 PROCEDURE — 86140 C-REACTIVE PROTEIN: CPT | Performed by: PSYCHIATRY & NEUROLOGY

## 2021-11-18 PROCEDURE — 82550 ASSAY OF CK (CPK): CPT | Performed by: PSYCHIATRY & NEUROLOGY

## 2021-11-18 PROCEDURE — 85652 RBC SED RATE AUTOMATED: CPT | Performed by: PATHOLOGY

## 2021-11-18 ASSESSMENT — MIFFLIN-ST. JEOR: SCORE: 1616.71

## 2021-11-18 ASSESSMENT — PAIN SCALES - GENERAL: PAINLEVEL: NO PAIN (0)

## 2021-11-18 NOTE — LETTER
2021       RE: Steve Herring  [address] [facility] [address]     Dear Colleague,    Thank you for referring your patient, Steve Herring, to the [facility]OLOG[facility] [place] [facility]. Please see a copy of my visit note below.    Service Date: 2021    Mango Gilliland MD  [facility]  [address] [place] [address]    RE:  Steve Herring  MRN:  [id number]  :  1945    Dear Dr. Gilliland:    We had the privilege of doing an evaluation in our Neurology Clinic on this is a 76-year-old gentleman who is accompanied by his son.  They are most pleasant people.  His history is somewhat complicated.  He had recently had an EMG by Dr. Winn at [facility] which suggested the possibility of motor neuron disease.  They are aware of this possibility.    His history is that he has had progressive weakness and atrophy in his upper extremities.  He has had no weakness perceivable in the lower extremities.  He is able to walk as much as he did before.    He has had a loss of muscles in the shoulder girdle area.  He has also been holding his head forward.  He has been seen by Orthopedics for some shoulder issues in [place] as of 10/26.  He had injections also in his knees, which are giving him some trouble with mobility.    He had Euflexxa injection bilaterally in the knees.    He has questionable difficulty getting up from a chair because of some proximal weakness he had previous to the knee problems.  He has previously been seen for shoulder issues and this goes back before, and he was then referred for EMG.  The patient has had a question of some entrapment neuropathy seen in the shoulder and has had MRI studies for this and left arm weakness has been reported, and he has had imaging done of this and this was an evaluation in the past which had indicated severe right shoulder rotator cuff  arthropathy and pseudoparalysis, and he had been scheduled for a shoulder arthroplasty on 11/17 and that was not carried through.  He seemed to have gotten worse and then the left arm got involved.  He has had no numbness or tingling in the extremities.  He has had no appreciable weakness in his legs, no difficulty swallowing, no double vision or cranial nerve problems.  He does tend to hold his head forward.    His EMG from Dr. Winn was reviewed and it shows significant acute and chronic denervation with fasciculations.    We have not seen a Neurology consultation with him.    PAST MEDICAL HISTORY:  Shows he has been anticoagulated with Xarelto, carries diagnoses of degenerative meniscus of both knees, Dupuytren contracture of the right hand, essential hypertension, erectile dysfunction, arthritis, colonic polyps, cataracts.    CURRENT MEDICATIONS:  Include simvastatin, sildenafil, Xarelto 20 mg a day for atrial fibrillation.    ALLERGIES:  Include amoxicillin and penicillin.    FAMILY HISTORY:  He has a nephew with Duchenne muscular dystrophy.  His uncle was in a wheelchair with questionable multiple sclerosis many years ago.    PHYSICAL EXAMINATION:  We examined him today.  His is a very pleasant man.  His blood pressure is 125/84, his pulse is 93.  He is very alert.  His cranial nerve exam is normal.  No tongue fasciculations are seen.  He does hold his head forward more than before, according to the son, who is a keen observer.  There is no weakness of sternocleidomastoid or any other cranial nerves, no orbicularis oculi.  Swallowing is okay.  Shoulders as above show profound atrophy, especially scapular and shoulder, with deltoids and biceps been basically 3 or minus.  The hands do show mild weakness of the interosseous, APB.  The biceps are very poor and rated as a 3.  Fasciculation possibly in the right first dorsal interosseous.  Reflexes are very subdued in the upper extremities.  Lower extremities are  brisk with crossed adductor reflex.  No Babinski, no clonus, no appreciable lower extremity weakness, except subjectively he has to use the armchairs to get up.  He ambulates well.  His Romberg is negative.  There are no sensory findings.  No neck pain to speak of.    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  He has a nephew with Duchenne muscular dystrophy, who actually works at the [employer], and his uncle had some rare disease and was in a wheelchair in his later years.    Cervical myelopathy needs to be ruled out.  His EMG suggests with a lot of fasciculations and chronic and acute denervation changes.  Why it is localized to mostly the shoulder girdle is not clear.  We have ordered CPK levels and other basic lab stuff.  He did have the laboratory studies baseline before with no major abnormalities noted in his workup.  He did have a CPK come back at 150.  His CRP is less than 2.9.  His labs have shown cholesterol 156, previous CK at 231, HDL 54, LDL 83.  He does not have anemia, hemoglobin 14.2.  He has had CT imaging of his shoulders, which he has had several times, and then the EMG done recently I referred to with the results as stated.    He is a concern for motor neuron disease, but this focal proximal activity would be a bit unusual.  There is no myotonia or anything else.    The family history is concerning.  I am not sure how it fits.   It may not be part of the problem.  We will refer him to the [facility] as soon as possible.  There is no respiratory involvement or bulbar.  The possibility of Juan syndrome can be ruled out, and with the family history it is a possibility.  This will need to be investigated.  We have usually kidney disease earlier onset than him, and there is no bulbar involvement as may be seen in Juan  syndrome.    We will wait for ALS evaluation, and hopefully that will be done relatively soon.    Sincerely,    Tray Esquivel MD        D: 2021   T: 2021   MT: dean    Name:     GUCCI KHAN  MRN:      [id number]        Account:      [id number]   :      1945           Service Date: 2021       Document: [id number]

## 2021-11-19 NOTE — PROGRESS NOTES
Service Date: 2021    Mango Gilliland MD  [facility]  [address] [place] [address]    RE:  Steve Herring  MRN:  [id number]  :  1945    Dear Dr. Gilliland:    We had the privilege of doing an evaluation in our Neurology Clinic on this is a 76-year-old gentleman who is accompanied by his son.  They are most pleasant people.  His history is somewhat complicated.  He had recently had an EMG by Dr. Winn at [facility] which suggested the possibility of motor neuron disease.  They are aware of this possibility.    His history is that he has had progressive weakness and atrophy in his upper extremities.  He has had no weakness perceivable in the lower extremities.  He is able to walk as much as he did before.    He has had a loss of muscles in the shoulder girdle area.  He has also been holding his head forward.  He has been seen by Orthopedics for some shoulder issues in [place] as of 10/26.  He had injections also in his knees, which are giving him some trouble with mobility.    He had Euflexxa injection bilaterally in the knees.    He has questionable difficulty getting up from a chair because of some proximal weakness he had previous to the knee problems.  He has previously been seen for shoulder issues and this goes back before, and he was then referred for EMG.  The patient has had a question of some entrapment neuropathy seen in the shoulder and has had MRI studies for this and left arm weakness has been reported, and he has had imaging done of this and this was an evaluation in the past which had indicated severe right shoulder rotator cuff arthropathy and pseudoparalysis, and he had been scheduled for a shoulder arthroplasty on  and that was not carried through.  He seemed to have gotten worse and then the left arm got involved.  He has had no numbness or tingling in the extremities.  He has had no appreciable weakness in his legs, no difficulty swallowing,  no double vision or cranial nerve problems.  He does tend to hold his head forward.    His EMG from Dr. Winn was reviewed and it shows significant acute and chronic denervation with fasciculations.    We have not seen a Neurology consultation with him.    PAST MEDICAL HISTORY:  Shows he has been anticoagulated with Xarelto, carries diagnoses of degenerative meniscus of both knees, Dupuytren contracture of the right hand, essential hypertension, erectile dysfunction, arthritis, colonic polyps, cataracts.    CURRENT MEDICATIONS:  Include simvastatin, sildenafil, Xarelto 20 mg a day for atrial fibrillation.    ALLERGIES:  Include amoxicillin and penicillin.    FAMILY HISTORY:  He has a nephew with Duchenne muscular dystrophy.  His uncle was in a wheelchair with questionable multiple sclerosis many years ago.    PHYSICAL EXAMINATION:  We examined him today.  His is a very pleasant man.  His blood pressure is 125/84, his pulse is 93.  He is very alert.  His cranial nerve exam is normal.  No tongue fasciculations are seen.  He does hold his head forward more than before, according to the son, who is a keen observer.  There is no weakness of sternocleidomastoid or any other cranial nerves, no orbicularis oculi.  Swallowing is okay.  Shoulders as above show profound atrophy, especially scapular and shoulder, with deltoids and biceps been basically 3 or minus.  The hands do show mild weakness of the interosseous, APB.  The biceps are very poor and rated as a 3.  Fasciculation possibly in the right first dorsal interosseous.  Reflexes are very subdued in the upper extremities.  Lower extremities are brisk with crossed adductor reflex.  No Babinski, no clonus, no appreciable lower extremity weakness, except subjectively he has to use the armchairs to get up.  He ambulates well.  His Romberg is negative.  There are no sensory findings.  No neck pain to speak of.    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  He has a nephew with Duchenne muscular dystrophy, who actually works at the [employer], and his uncle had some rare disease and was in a wheelchair in his later years.    Cervical myelopathy needs to be ruled out.  His EMG suggests with a lot of fasciculations and chronic and acute denervation changes.  Why it is localized to mostly the shoulder girdle is not clear.  We have ordered CPK levels and other basic lab stuff.  He did have the laboratory studies baseline before with no major abnormalities noted in his workup.  He did have a CPK come back at 150.  His CRP is less than 2.9.  His labs have shown cholesterol 156, previous CK at 231, HDL 54, LDL 83.  He does not have anemia, hemoglobin 14.2.  He has had CT imaging of his shoulders, which he has had several times, and then the EMG done recently I referred to with the results as stated.    He is a concern for motor neuron disease, but this focal proximal activity would be a bit unusual.  There is no myotonia or anything else.    The family history is concerning.  I am not sure how it fits.   It may not be part of the problem.  We will refer him to the [facility] ALS Clinic as soon as possible.  There is no respiratory involvement or bulbar.  The possibility of Juan syndrome can be ruled out, and with the family history it is a possibility.  This will need to be investigated.  We have usually kidney disease earlier onset than him, and there is no bulbar involvement as may be seen in Juan syndrome.    We will wait for ALS evaluation, and hopefully that will be done relatively soon.    Sincerely,    Tray Esquivel MD        D: 2021   T: 2021   MT: dean    Name:     GUCCI KHAN  MRN:      [id number]        Account:      [id number]   :      1945           Service Date: 2021        Document: [id number]

## 2021-12-06 DIAGNOSIS — I48.0 PAROXYSMAL ATRIAL FIBRILLATION (H): ICD-10-CM

## 2021-12-08 NOTE — TELEPHONE ENCOUNTER
rivaroxaban ANTICOAGULANT (XARELTO ANTICOAGULANT) 20 MG TABS tablet  Last Written Prescription Date:  1/8/2021  Last Fill Quantity: 90,   # refills: 3  Last Office Visit : 12/11/2020  Future Office visit:  12/10/2021    Routing refill request to provider for review/approval because:  Abnormal PLT        Refer to Provider for review     Recent Labs   Lab Test 08/31/21  1753   *       Dahiana Auguste RN  Central Triage Red Flags/Med Refills

## 2021-12-10 ENCOUNTER — VIRTUAL VISIT (OUTPATIENT)
Dept: CARDIOLOGY | Facility: CLINIC | Age: 76
End: 2021-12-10
Payer: COMMERCIAL

## 2021-12-10 DIAGNOSIS — R55 SYNCOPE, UNSPECIFIED SYNCOPE TYPE: ICD-10-CM

## 2021-12-10 DIAGNOSIS — I48.0 PAROXYSMAL ATRIAL FIBRILLATION (H): Primary | ICD-10-CM

## 2021-12-10 PROCEDURE — 99214 OFFICE O/P EST MOD 30 MIN: CPT | Mod: 95 | Performed by: INTERNAL MEDICINE

## 2021-12-10 NOTE — PROGRESS NOTES
Steve Herring is a 76 year old who is being evaluated via a billable video visit.      How would you like to obtain your AVS? Mail a copy  If the video visit is dropped, the invitation should be resent by: Text to cell phone: [phone]  Will anyone else be joining your video visit? No      Video-Visit Details    Type of service:  Video Visit    Video Start Time: 8:24 AM    Video End Time:8:54 AM    Originating Location (pt. Location): Home    Distant Location (provider location):  [facility]     Platform used for Video Visit: Angiodroid     [facility] Virtual Visit:    Assessment and Plan:     1. Paroxysmal atrial fibrillation/flutter, [id number] score of 2, LVEF 50 to 55%, sinus bradycardia  Lifelong anticoagulation to decrease stroke risk, he is on Xarelto.  Not on metoprolol due to sinus bradycardia.  Can take metoprolol low-dose as needed for A. fib episodes.     2. Hypertension --controlled on lisinopril    3. Hyperlipidemia   Continue simvastatin 20     4.  Mild cardiomyopathy, LVEF 50 to 55%, NYHA class I  Etiology is likely A. fib.  On lisinopril, not on metoprolol due to bradycardia  Check echo    5. Mild ADELA-- not on CPAP.  Feels he does not have ADELA anymore    6.  Syncope: Unclear etiology.  Check Zio patch for 1 week       Annual follow-up      Martin Jorgensen MD    Cardiac Imaging and Prevention  [facility]  [email or web address] Pager: [phone]    HPI: Routine annual follow-up for paroxysmal A. fib, video visit.  There have not been any interval cardiac issues since last visit.  He has been having a lot of orthopedic and neurological issues and is following up closely regarding those, and feels frustrated about it.  I reviewed his echocardiogram that was done earlier this year.  It showed borderline reduced LV function with LVEF of 50 to 55%.  I had instructed him to start metoprolol, however he could not do that  due to sinus bradycardia.  He did finally start taking Xarelto.  He continues to take lisinopril for hypertension.  Blood pressure at home is well controlled, and pulse ranges from mid 40s to mid 50s.  He had a syncopal episode 1 to 2 days ago when he was walking down the stairs.  His wife found him slumped over at the bottom of the stairs, and he does not remember feeling dizzy or passing out.  Luckily they were no injuries.  He remembers feeling fine when he woke up.  He has otherwise not had any lightheadedness.  He remembers one prior syncopal episode related to pain.  He reports occasional palpitations that he attributes to A. fib, and they can last up to 30 minutes.  Denies any chest pain or pressure, shortness of breath/dyspnea, orthopnea, pnd, or edema.    EXAM:  GEN/CONSTITUIONAL: Appears comfortable, in no apparent distress   EYES: No icterus noted.   JVP:  Not visible  RESPIRATORY: No cough or labored breathing   NEUROLOGIC: No tremor noted  PSYCHIATRIC: Normal affect  EXT: No edema noted.  Skin: No rash visible  The rest of a comprehensive physical examination is deferred due to PHE (public health emergency) video visit restrictions.    PAST MEDICAL HISTORY:  Past Medical History:   Diagnosis Date     Arthritis 01/01/2010     Colon polyps 01/01/2003    colonoscopy due Jan 2012     Dupuytren's contracture ~2000    RT>LT     Erectile dysfunction      Essential hypertension, benign 01/01/2003     Hyperlipidemia LDL goal <130      MMT (medial meniscus tear) 01/01/2004    RT     Paroxysmal atrial fibrillation (H)        CURRENT MEDICATIONS:  Current Outpatient Medications   Medication     lisinopril (ZESTRIL) 10 MG tablet     rivaroxaban ANTICOAGULANT (XARELTO ANTICOAGULANT) 20 MG TABS tablet     simvastatin (ZOCOR) 20 MG tablet     sildenafil (REVATIO) 20 MG tablet     No current facility-administered medications for this visit.     Facility-Administered Medications Ordered in Other Visits   Medication      sodium chloride (PF) 0.9% PF flush 10 mL       PAST SURGICAL HISTORY:  Past Surgical History:   Procedure Laterality Date     CATARACT IOL, RT/LT  12/18/08    RT     CATARACT IOL, RT/LT  11/13/13    LT, Dr. Carvajal     COLONOSCOPY       [facility] PNEUMATIC RETINOPEXY  3/17/05    Dr. Steve Hebert      REMOVE TONSILS/ADENOIDS,12+ Y/O         ALLERGIES     Allergies   Allergen Reactions     Amoxicillin Hives     Also angioedema     Penicillin G Hives       FAMILY HISTORY:  Family History   Problem Relation Age of Onset     Diabetes Mother         elderly Type 2 borderline     C.A.D. Father         stented     Hypertension Father      Melanoma Son      Heart Disease Son         ablation for reentrant tachycardia     Esophageal Cancer Brother 66     Breast Cancer Sister 40     Cancer - colorectal No family hx of      Anesthesia Reaction No family hx of      Blood Disease No family hx of      Thrombophilia No family hx of        SOCIAL HISTORY:  Social History     Socioeconomic History     Marital status:      Spouse name: Not on file     Number of children: 2     Years of education: Not on file     Highest education level: Not on file   Occupational History     Employer: [employer]   Tobacco Use     Smoking status: Never Smoker     Smokeless tobacco: Never Used   Substance and Sexual Activity     Alcohol use: Yes     Comment: seldom     Drug use: No     Sexual activity: Yes     Partners: Female     Birth control/protection: None   Other Topics Concern     Parent/sibling w/ CABG, MI or angioplasty before 65F 55M? No   Social History Narrative     Not on file     Social Determinants of Health     Financial Resource Strain: Not on file   Food Insecurity: Not on file   Transportation Needs: Not on file   Physical Activity: Not on file   Stress: Not on file   Social Connections: Not on file   Intimate Partner Violence: Not on file   Housing Stability: Not on file       ROS:   Constitutional: No fever,  chills, or sweats. No weight gain/loss   ENT: No visual disturbance, ear ache, epistaxis, sore throat  Allergies/Immunologic: Negative.   Respiratory: No cough, hemoptysia  Cardiovascular: As per HPI  GI: No nausea, vomiting, hematemesis, melena, or hematochezia  : No urinary frequency, dysuria, or hematuria  Integument: Negative  Psychiatric: Negative  Neuro: Negative  Endocrinology: Negative   Musculoskeletal: Negative    ADDITIONAL COMMENTS:     I reviewed the patient's medications:     I reviewed the patient's pertinent clinical laboratory studies:     I reviewed the patient's pertinent imaging studies:   I reviewed the patient's ECG:

## 2021-12-11 ASSESSMENT — ENCOUNTER SYMPTOMS
HALLUCINATIONS: 0
LEG PAIN: 0
HYPERTENSION: 0
DIZZINESS: 0
MEMORY LOSS: 0
LIGHT-HEADEDNESS: 0
MUSCLE WEAKNESS: 1
LOSS OF CONSCIOUSNESS: 1
MUSCLE CRAMPS: 0
NIGHT SWEATS: 0
JOINT SWELLING: 0
FATIGUE: 1
SEIZURES: 0
ORTHOPNEA: 0
TREMORS: 0
DECREASED APPETITE: 0
ARTHRALGIAS: 1
CHILLS: 0
SYNCOPE: 1
BACK PAIN: 0
WEIGHT GAIN: 0
ALTERED TEMPERATURE REGULATION: 0
HYPOTENSION: 0
DISTURBANCES IN COORDINATION: 0
WEIGHT LOSS: 0
NUMBNESS: 0
TINGLING: 0
WEAKNESS: 1
EXERCISE INTOLERANCE: 0
PALPITATIONS: 1
HEADACHES: 1
POLYDIPSIA: 0
NECK PAIN: 1
STIFFNESS: 0
SLEEP DISTURBANCES DUE TO BREATHING: 0
SPEECH CHANGE: 0
MYALGIAS: 0
PARALYSIS: 0
FEVER: 0
INCREASED ENERGY: 0
POLYPHAGIA: 0

## 2021-12-14 ENCOUNTER — ANCILLARY PROCEDURE (OUTPATIENT)
Dept: MRI IMAGING | Facility: CLINIC | Age: 76
End: 2021-12-14
Attending: PSYCHIATRY & NEUROLOGY
Payer: COMMERCIAL

## 2021-12-14 DIAGNOSIS — M47.12 CERVICAL SPONDYLOSIS WITH MYELOPATHY: Primary | ICD-10-CM

## 2021-12-14 DIAGNOSIS — M47.12 CERVICAL SPONDYLOSIS WITH MYELOPATHY: ICD-10-CM

## 2021-12-14 PROCEDURE — 72156 MRI NECK SPINE W/O & W/DYE: CPT | Performed by: RADIOLOGY

## 2021-12-14 PROCEDURE — A9585 GADOBUTROL INJECTION: HCPCS | Performed by: RADIOLOGY

## 2021-12-14 RX ORDER — GADOBUTROL 604.72 MG/ML
10 INJECTION INTRAVENOUS ONCE
Status: COMPLETED | OUTPATIENT
Start: 2021-12-14 | End: 2021-12-14

## 2021-12-14 RX ADMIN — GADOBUTROL 8 ML: 604.72 INJECTION INTRAVENOUS at 18:07

## 2021-12-15 ENCOUNTER — TELEPHONE (OUTPATIENT)
Dept: NEUROLOGY | Facility: CLINIC | Age: 76
End: 2021-12-15
Payer: COMMERCIAL

## 2021-12-15 NOTE — DISCHARGE INSTRUCTIONS
MRI Contrast Discharge Instructions    The IV contrast you received today will pass out of your body in your  urine. This will happen in the next 24 hours. You will not feel this process.  Your urine will not change color.    Drink at least 4 extra glasses of water or juice today (unless your doctor  has restricted your fluids). This reduces the stress on your kidneys.  You may take your regular medicines.    If you are on dialysis: It is best to have dialysis today.    If you have a reaction: Most reactions happen right away. If you have  any new symptoms after leaving the hospital (such as hives or swelling),  call your hospital at the correct number below. Or call your family doctor.  If you have breathing distress or wheezing, call 911.    Special instructions: ***    I have read and understand the above information.    Signature:______________________________________ Date:___________    Staff:__________________________________________ Date:___________     Time:__________    [facility] Departments:    ___[place]: [phone]  ___[facility]: [phone]  ___[place]: [phone] ___[facility]: [phone]  ___[facility]: [phone]  ___[facility]: [phone]  ___[phone]  ___[facility]: [phone]  ___[phone]

## 2021-12-16 ENCOUNTER — LAB (OUTPATIENT)
Dept: LAB | Facility: CLINIC | Age: 76
End: 2021-12-16
Payer: COMMERCIAL

## 2021-12-16 ENCOUNTER — OFFICE VISIT (OUTPATIENT)
Dept: NEUROLOGY | Facility: CLINIC | Age: 76
End: 2021-12-16
Payer: COMMERCIAL

## 2021-12-16 VITALS
DIASTOLIC BLOOD PRESSURE: 81 MMHG | WEIGHT: 181.1 LBS | RESPIRATION RATE: 16 BRPM | OXYGEN SATURATION: 96 % | BODY MASS INDEX: 23.89 KG/M2 | HEART RATE: 92 BPM | SYSTOLIC BLOOD PRESSURE: 114 MMHG

## 2021-12-16 DIAGNOSIS — R55 SYNCOPE, UNSPECIFIED SYNCOPE TYPE: ICD-10-CM

## 2021-12-16 DIAGNOSIS — G12.21 BRACHIAL AMYOTROPHIC DIPLEGIA (H): Primary | ICD-10-CM

## 2021-12-16 DIAGNOSIS — M47.12 CERVICAL SPONDYLOSIS WITH MYELOPATHY: ICD-10-CM

## 2021-12-16 DIAGNOSIS — G12.21 BRACHIAL AMYOTROPHIC DIPLEGIA (H): ICD-10-CM

## 2021-12-16 LAB
B BURGDOR IGG+IGM SER QL: 0.16
HIV 1+2 AB+HIV1 P24 AG SERPL QL IA: NONREACTIVE

## 2021-12-16 PROCEDURE — 83519 RIA NONANTIBODY: CPT | Mod: 90 | Performed by: PATHOLOGY

## 2021-12-16 PROCEDURE — 36415 COLL VENOUS BLD VENIPUNCTURE: CPT | Performed by: PATHOLOGY

## 2021-12-16 PROCEDURE — 86255 FLUORESCENT ANTIBODY SCREEN: CPT | Mod: 90 | Performed by: PATHOLOGY

## 2021-12-16 PROCEDURE — 86334 IMMUNOFIX E-PHORESIS SERUM: CPT | Mod: TC | Performed by: PSYCHIATRY & NEUROLOGY

## 2021-12-16 PROCEDURE — 83516 IMMUNOASSAY NONANTIBODY: CPT | Mod: 90 | Performed by: PATHOLOGY

## 2021-12-16 PROCEDURE — 86618 LYME DISEASE ANTIBODY: CPT | Performed by: PSYCHIATRY & NEUROLOGY

## 2021-12-16 PROCEDURE — 86334 IMMUNOFIX E-PHORESIS SERUM: CPT | Mod: 26 | Performed by: STUDENT IN AN ORGANIZED HEALTH CARE EDUCATION/TRAINING PROGRAM

## 2021-12-16 PROCEDURE — 87389 HIV-1 AG W/HIV-1&-2 AB AG IA: CPT | Performed by: PSYCHIATRY & NEUROLOGY

## 2021-12-16 PROCEDURE — 99000 SPECIMEN HANDLING OFFICE-LAB: CPT | Performed by: PATHOLOGY

## 2021-12-16 PROCEDURE — 99215 OFFICE O/P EST HI 40 MIN: CPT | Performed by: PSYCHIATRY & NEUROLOGY

## 2021-12-16 PROCEDURE — 94375 RESPIRATORY FLOW VOLUME LOOP: CPT | Performed by: INTERNAL MEDICINE

## 2021-12-16 ASSESSMENT — PAIN SCALES - GENERAL: PAINLEVEL: NO PAIN (0)

## 2021-12-16 NOTE — PATIENT INSTRUCTIONS
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 This is a variant of amyotrophic lateral sclerosis (ALS) which causes ultimately complete paralysis of the arm muscles but the muscles of the legs, breathing, and swallowing are often spared for many years and the outcome is much better than the typical ALS- people will live many more years with it. Unfortunately however, there is no way we can reverse or fix the weakness of the arms.    I would like you to schedule an EMG with me to see if the nerve damage is truly limited to the neck area or is spreading towards the head, the mid back (thoracic region) or the lower back (lumbar region).  Blood tests today to rule out conditions that look like flail arm but are treatable (unlikely).    Follow up in 3 months    Blood pressure sitting and standing and EKG today. Please call your primary care doctor next week about the event of loss of consciousness.    Consider taking a pill called riluzole. It can slow down progression of ALS by 3-6 months.    Our research coordinator will contact you.     Please call [facility] @ [phone] for questions or concerns during regular business hours. For a more efficient way to communicate, use Boxbe and address the message to your physician. Remember, [employer] (deja mi)t is only read during business hours. Do not leave urgent messages on voicemail or Boxbe. If situation is urgent, contact the Neurology Clinic @ [phone] and ask to speak to a Triage Nurse or Call 911 or visit an Emergency Department.    Please call your pharmacy if you need a medication refill. They will send us an electronic message.

## 2021-12-16 NOTE — PROGRESS NOTES
Service Date: 2021    Tray Esquivel MD  [facility]  [address]  [place]  [address]    RE:  Steve Herring  MRN:  [id number]  :  1945    Dear Tray:    I had the pleasure to see Mr. Herring at the [facility] today.  Thank you for this referral.  He was escorted by his son.  He is a pleasant 76-year-old man who noticed progressive weakness of his upper extremities for the last year. About a year ago, he was playing golf without any limitations.  The first sign was difficulty elevating the right arm overhead and flexing the elbow or externally rotating the arm.  It got markedly worse in the next 12 months.  The left arm was affected a few months later. Now he can abduct the left arm to about 50-60 degrees.  He has minimal if any abduction of the right arm and his proximal arm function is essentially gone.  He does not complain of major difficulty manipulating coins, doing fine hand movements or using a pen or brushing his teeth. He has noticed occasional muscle twitching in the left biceps and perhaps pectoralis region, but not in the right arm, the legs or the trunk, and he denies any cramping.  He has no sensory symptoms in the upper and lower extremities and no bowel or bladder control problems.  He denies dysphagia, dysarthria, sialorrhea, difficulty chewing, ptosis, diplopia or other cranial nerve symptoms.  There is no pseudobulbar affect.  His son has observed that sometimes he walks with slightly stooped posture and head leaning forward. He has some shortness of breath with prolonged exertion but no orthopnea and he sleeps with only 1 pillow at night.  He does not complain of non-refreshing sleep.    In terms of family history, his mother  at age 86 of aspiration pneumonia during a hospitalization.  She suffered from dementia but did not have any muscle weakness prior. Dad  from lung cancer at age  77.  He has a nephew with Duchenne muscular dystrophy, but that nephew is the son of his brother and DMD is an X-linked disease.  There was also a maternal uncle who had MS.  I asked him and he remembers that his maternal uncle lived for a couple of decades with the MS diagnosis, so it is unlikely that he was misdiagnosed and he had ALS.     Social history: He does not smoke and drinks alcohol seldom.     He had an EMG at [facility] 11/01/2021 which I reviewed independently. Sensory nerve conduction studies were essentially normal except for mild right carpal tunnel syndrome.  Amplitude of sensory responses, however, were intact.  His motor nerve conduction studies in the upper extremities showed normal CMAP amplitudes for median and ulnar nerves, no conduction block or significant conduction velocity slowing.  Needle examination showed denervation changes with fibrillations and reduced recruitment of large long duration polyphasic units in multiple muscles of bilateral upper extremities proximally and distally.  The study included only cervical region. Thoracic, lumbar and cranial regions were not sampled.    Cervical spine MRI 12/14/2021 was reviewed.  There is multilevel cervical spondylosis, but in my opinion, it is not particularly severe at any level.  There is no myelomalacia and I do not think that this spondylosis can explain his degree of weakness. In terms of medications, he is on rivaroxaban for AFib, lisinopril for hypertension and simvastatin for hyperlipidemia.  He had a CK checked 08/2021 and 11/2021 that were both normal at 231 and 150, respectively.  Sed rate and CRP, TSH, lipid panel, comprehensive metabolic panel and CBC were all normal except for very mild thrombocytopenia with platelets at 144.     PFT Latest Ref Rng & Units 12/16/2021   FVC L 4.16   FEV1 L 3.14   FVC% % 94   FEV1% % 96     EXAM: /81   Pulse 92   Resp 16   Wt 82.1 kg (181 lb 1.6 oz)   SpO2 96%   BMI  23.89 kg/m      He is awake, alert and oriented x3.  He is able to provide a coherent history.  He has no ptosis or ophthalmoparesis.  Ductions and versions of the eyes are normal.  There is no weakness of orbicularis oculi, oris, uvula, jaw, palate or tongue.  I saw no tongue atrophy or fasciculations.  There was no dysphonia or dysarthria.  There was no jaw jerk.  His strength of neck flexion and extension was full.  I do not see any facial atrophy either.  He has profound paresis of bilateral upper extremities.  His shoulder external rotation is bilaterally 0.  Deltoid barely 2 on the right, 2.5 left.  Biceps 3- right, 3 left.  Triceps bilaterally 3.  Wrist extensors right 3 to 4-, left 4-.  Importantly, he has significant distal weakness as well.  Right finger extensors are 4 for digits II and III and less than antigravity for digits IV and V.  They are 4- on the left.  FDI is right 3, left 4.  APB is in the range of 3 to 4- on both sides.  He has marked wasting of proximal and periscapular muscles but also  mild distal wasting as well.  I did see some fasciculations in the biceps and bilateral FDI muscles. In the legs, strength is 5/5 for hip flexion, hip abduction, adduction, knee extension, knee flexion, foot dorsiflexion.  Tone is normal in arms and legs.  Upper extremity reflexes are absent. Lower extremity reflexes are very brisk, 3+ at the knees with suprapatellar spread, and 2 to 3+ at the ankles.  There is no sustained ankle clonus and toes are mute. Agility of foot tapping is normal.  Agility of finger tapping is slow, but proportionately to the weakness.  There are no sensory deficits in the upper extremities.  In the lower extremities, vibration is perhaps slightly reduced at the toes at 4 seconds and 7 to 8 at the medial malleoli, but position sensation and pinprick are fine. He cannot do finger-to-nose due to weakness.  His casual gait appeared normal, although he had a stooped posture and a mild  head drop during walking.  Romberg was negative.  On inspection, there was atrophy not only in the periscapular region but also in the upper and mid thoracic paraspinal region too.    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  This is an uncommon variant of ALS where upper motor neuron signs are not prominent, or absent (in fact, his upper extremity reflexes are absent and he has no spasticity or other UMN signs in the cervical region).  I explained to him that this syndrome has a much slower progression than typical ALS and many people live several (>6-7) years with it because involvement of bulbar muscles, diaphragm or lower extremity muscles may occur later but at a much slower pace and to a much milder extent.  However, it remains an incurable condition and there is no disease modifying treatment currently available specifically for it.  I offered him riluzole which can slow progression of ALS by 3-6 months, but he wants to think about it because he belongs to a subpopulation of patients with a more prolonged survival and I do not know if this prolongation of life by 3-6 months is meaningful for him.  I will let him think about it.  I would not recommend Radicava in the setting.      He should see occupational therapy; he does not have any other therapy needs right now.  His pulmonary function was very satisfactory and I have no concerns over it.  I will ask our research coordinator to contact him regarding any research options for motor neuron disease. I will also perform some additional blood testing to rule out treatable mimics of the flail arm syndrome or motor neuron disease including GM1 antibodies, paraneoplastic antibodies, HIV, Lyme serologies and protein immunofixation.  I told him that the likelihood we are going to make a diagnosis from those is very low.      As for his family history: His nephew's Duchenne dystrophy did not stem  from Mr Herring's family lineage because DMD is an X-linked disorder and it is a child of his brother. Duchenne is never passed from male to male.  In addition, there is no clear history of motor neuron disease affecting his parents.  A maternal uncle had multiple sclerosis that is unrelated.  I do not think genetic testing would be high yield in this context so I will not order it.  This is not Juan disease.  Juan never presents with an isolated complete flail arm syndrome.  Those patients almost always have very striking bulbar muscle weakness and fasciculations and I do not see any on Mr. Herring.     He will return to Clinic for follow up in 3 months or sooner if needed.     Thank you for allowing me to participate in his care.Total time spent on this encounter today 60 minutes, including 40 face-to-face, 10 on post-visit note dictation, editing and orders, and 10 on pre-visit chart review.    Sincerely,    Sincerely,    Shawn Palumbo MD    cc:  Mango Gilliland MD  [address]  [address]    Shawn Palumbo MD        D: 2021   T: 2021   MT: aurelio    Name:     GUCCI HERRING  MRN:      [id number]        Account:      [id number]   :      1945           Service Date: 2021       Document: [id number]

## 2021-12-16 NOTE — LETTER
2021       RE: Steve Herring  [address]  [place] [address]     Dear Colleague,    Thank you for referring your patient, Steve Herring, to the [facility] NEUROLOGY CLINIC [place] at [facility]. Please see a copy of my visit note below.    Service Date: 2021    Tray Esquivel MD  [facility]  [address] [facility] [address]  [place]  [address]    RE:  Steve Herring  MRN:  [id number]  :  1945    Dear Tray:    I had the pleasure to see Mr. Herring at the [facility] today.  Thank you for this referral.  He was escorted by his son.  He is a pleasant 76-year-old man who noticed progressive weakness of his upper extremities for the last year. About a year ago, he was playing golf without any limitations.  The first sign was difficulty elevating the right arm overhead and flexing the elbow or externally rotating the arm.  It got markedly worse in the next 12 months.  The left arm was affected a few months later. Now he can abduct the left arm to about 50-60 degrees.  He has minimal if any abduction of the right arm and his proximal arm function is essentially gone.  He does not complain of major difficulty manipulating coins, doing fine hand movements or using a pen or brushing his teeth. He has noticed occasional muscle twitching in the left biceps and perhaps pectoralis region, but not in the right arm, the legs or the trunk, and he denies any cramping.  He has no sensory symptoms in the upper and lower extremities and no bowel or bladder control problems.  He denies dysphagia, dysarthria, sialorrhea, difficulty chewing, ptosis, diplopia or other cranial nerve symptoms.  There is no pseudobulbar affect.  His son has observed that sometimes he walks with slightly stooped posture and head leaning forward. He has some shortness of breath with prolonged  exertion but no orthopnea and he sleeps with only 1 pillow at night.  He does not complain of non-refreshing sleep.    In terms of family history, his mother  at age 86 of aspiration pneumonia during a hospitalization.  She suffered from dementia but did not have any muscle weakness prior. Dad  from lung cancer at age 77.  He has a nephew with Duchenne muscular dystrophy, but that nephew is the son of his brother and DMD is an X-linked disease.  There was also a maternal uncle who had MS.  I asked him and he remembers that his maternal uncle lived for a couple of decades with the MS diagnosis, so it is unlikely that he was misdiagnosed and he had ALS.     Social history: He does not smoke and drinks alcohol seldom.     He had an EMG at [facility] 2021 which I reviewed independently. Sensory nerve conduction studies were essentially normal except for mild right carpal tunnel syndrome.  Amplitude of sensory responses, however, were intact.  His motor nerve conduction studies in the upper extremities showed normal CMAP amplitudes for median and ulnar nerves, no conduction block or significant conduction velocity slowing.  Needle examination showed denervation changes with fibrillations and reduced recruitment of large long duration polyphasic units in multiple muscles of bilateral upper extremities proximally and distally.  The study included only cervical region. Thoracic, lumbar and cranial regions were not sampled.    Cervical spine MRI 2021 was reviewed.  There is multilevel cervical spondylosis, but in my opinion, it is not particularly severe at any level.  There is no myelomalacia and I do not think that this spondylosis can explain his degree of weakness. In terms of medications, he is on rivaroxaban for AFib, lisinopril for hypertension and simvastatin for hyperlipidemia.  He had a CK checked 2021 and 2021 that were both normal at 231 and 150, respectively.  Sed rate  and CRP, TSH, lipid panel, comprehensive metabolic panel and CBC were all normal except for very mild thrombocytopenia with platelets at 144.     PFT Latest Ref Rng & Units 12/16/2021   FVC L 4.16   FEV1 L 3.14   FVC% % 94   FEV1% % 96     EXAM: /81   Pulse 92   Resp 16   Wt 82.1 kg (181 lb 1.6 oz)   SpO2 96%   BMI 23.89 kg/m      He is awake, alert and oriented x3.  He is able to provide a coherent history.  He has no ptosis or ophthalmoparesis.  Ductions and versions of the eyes are normal.  There is no weakness of orbicularis oculi, oris, uvula, jaw, palate or tongue.  I saw no tongue atrophy or fasciculations.  There was no dysphonia or dysarthria.  There was no jaw jerk.  His strength of neck flexion and extension was full.  I do not see any facial atrophy either.  He has profound paresis of bilateral upper extremities.  His shoulder external rotation is bilaterally 0.  Deltoid barely 2 on the right, 2.5 left.  Biceps 3- right, 3 left.  Triceps bilaterally 3.  Wrist extensors right 3 to 4-, left 4-.  Importantly, he has significant distal weakness as well.  Right finger extensors are 4 for digits II and III and less than antigravity for digits IV and V.  They are 4- on the left.  FDI is right 3, left 4.  APB is in the range of 3 to 4- on both sides.  He has marked wasting of proximal and periscapular muscles but also  mild distal wasting as well.  I did see some fasciculations in the biceps and bilateral FDI muscles. In the legs, strength is 5/5 for hip flexion, hip abduction, adduction, knee extension, knee flexion, foot dorsiflexion.  Tone is normal in arms and legs.  Upper extremity reflexes are absent. Lower extremity reflexes are very brisk, 3+ at the knees with suprapatellar spread, and 2 to 3+ at the ankles.  There is no sustained ankle clonus and toes are mute. Agility of foot tapping is normal.  Agility of finger tapping is slow, but proportionately to the weakness.  There are no sensory  deficits in the upper extremities.  In the lower extremities, vibration is perhaps slightly reduced at the toes at 4 seconds and 7 to 8 at the medial malleoli, but position sensation and pinprick are fine. He cannot do finger-to-nose due to weakness.  His casual gait appeared normal, although he had a stooped posture and a mild head drop during walking.  Romberg was negative.  On inspection, there was atrophy not only in the periscapular region but also in the upper and mid thoracic paraspinal region too.    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  This is an uncommon variant of ALS where upper motor neuron signs are not prominent, or absent (in fact, his upper extremity reflexes are absent and he has no spasticity or other UMN signs in the cervical region).  I explained to him that this syndrome has a much slower progression than typical ALS and many people live several (>6-7) years with it because involvement of bulbar muscles, diaphragm or lower extremity muscles may occur later but at a much slower pace and to a much milder extent.  However, it remains an incurable condition and there is no disease modifying treatment currently available specifically for it.  I offered him riluzole which can slow progression of ALS by 3-6 months, but he wants to think about it because he belongs to a subpopulation of patients with a more prolonged survival and I do not know if this prolongation of life by 3-6 months is meaningful for him.  I will let him think about it.  I would not recommend Radicava in the setting.      He should see occupational therapy; he does not have any other therapy needs right now.  His pulmonary function was very satisfactory and I have no concerns over it.  I will ask our research coordinator to contact him regarding any research options for motor neuron disease. I will also perform some additional blood testing to rule out  treatable mimics of the flail arm syndrome or motor neuron disease including GM1 antibodies, paraneoplastic antibodies, HIV, Lyme serologies and protein immunofixation.  I told him that the likelihood we are going to make a diagnosis from those is very low.      As for his family history: His nephew's Duchenne dystrophy did not stem from Mr Herring's family lineage because DMD is an X-linked disorder and it is a child of his brother. Duchenne is never passed from male to male.  In addition, there is no clear history of motor neuron disease affecting his parents.  A maternal uncle had multiple sclerosis that is unrelated.  I do not think genetic testing would be high yield in this context so I will not order it.  This is not Juan disease.  Juan never presents with an isolated complete flail arm syndrome.  Those patients almost always have very striking bulbar muscle weakness and fasciculations and I do not see any on Mr. Herring.     He will return to Clinic for follow up in 3 months or sooner if needed.     Thank you for allowing me to participate in his care.Total time spent on this encounter today 60 minutes, including 40 face-to-face, 10 on post-visit note dictation, editing and orders, and 10 on pre-visit chart review.    Sincerely,    Shawn Palumbo MD    cc:  Mango Gilliland MD  [address]  [address]

## 2021-12-17 LAB — PROT PATTERN SERPL IFE-IMP: NORMAL

## 2021-12-20 DIAGNOSIS — R55 SYNCOPE, UNSPECIFIED SYNCOPE TYPE: ICD-10-CM

## 2021-12-20 LAB
ATRIAL RATE - MUSE: 91 BPM
DIASTOLIC BLOOD PRESSURE - MUSE: NORMAL MMHG
EXPTIME-PRE: 8.32 SEC
FEF2575-%PRED-PRE: 101 %
FEF2575-PRE: 2.41 L/SEC
FEF2575-PRED: 2.38 L/SEC
FEFMAX-%PRED-PRE: 107 %
FEFMAX-PRE: 9.01 L/SEC
FEFMAX-PRED: 8.4 L/SEC
FEV1-%PRED-PRE: 96 %
FEV1-PRE: 3.14 L
FEV1FEV6-PRE: 76 %
FEV1FEV6-PRED: 77 %
FEV1FVC-PRE: 76 %
FEV1FVC-PRED: 75 %
FIFMAX-PRE: 7.18 L/SEC
FVC-%PRED-PRE: 94 %
FVC-PRE: 4.16 L
FVC-PRED: 4.39 L
INTERPRETATION ECG - MUSE: NORMAL
MEP-PRE: 79 CMH2O
MIP-PRE: -77 CMH2O
P AXIS - MUSE: 67 DEGREES
PR INTERVAL - MUSE: 178 MS
QRS DURATION - MUSE: 120 MS
QT - MUSE: 352 MS
QTC - MUSE: 432 MS
R AXIS - MUSE: -48 DEGREES
SYSTOLIC BLOOD PRESSURE - MUSE: NORMAL MMHG
T AXIS - MUSE: 76 DEGREES
VENTRICULAR RATE- MUSE: 91 BPM

## 2021-12-21 LAB
AMPHIPHYSIN AB TITR SER: NEGATIVE TITER
CV2 IGG TITR SER: NEGATIVE TITER
GLIAL NUC TYPE 1 AB TITR SER: NEGATIVE TITER
GM1 GANGL IGG SER IA-ACNC: 4 IV
GM1 GANGL IGM SER IA-ACNC: 13 IV
HU1 AB TITR SER: NEGATIVE TITER
HU2 AB TITR SER IF: NEGATIVE TITER
HU3 AB TITR SER: NEGATIVE TITER
IMMUNOLOGIST REVIEW: NORMAL
LABORATORY COMMENT REPORT: NORMAL
NACHR AB SER-SCNC: 0 NMOL/L
PCA-1 AB TITR SER: NEGATIVE TITER
PCA-2 AB TITR SER: NEGATIVE TITER
PCA-TR AB TITR SER IF: NEGATIVE TITER
VGCC-P/Q BIND AB SER-SCNC: 0 NMOL/L
VGKC AB SER-SCNC: 0 NMOL/L

## 2022-01-01 ENCOUNTER — TELEPHONE (OUTPATIENT)
Dept: NEUROLOGY | Facility: CLINIC | Age: 77
End: 2022-01-01

## 2022-01-01 ENCOUNTER — LAB (OUTPATIENT)
Dept: LAB | Facility: CLINIC | Age: 77
End: 2022-01-01
Payer: COMMERCIAL

## 2022-01-01 ENCOUNTER — THERAPY VISIT (OUTPATIENT)
Dept: PHYSICAL THERAPY | Facility: CLINIC | Age: 77
End: 2022-01-01
Payer: COMMERCIAL

## 2022-01-01 ENCOUNTER — OFFICE VISIT (OUTPATIENT)
Dept: NEUROLOGY | Facility: CLINIC | Age: 77
End: 2022-01-01
Payer: COMMERCIAL

## 2022-01-01 ENCOUNTER — OFFICE VISIT (OUTPATIENT)
Dept: CARDIOLOGY | Facility: CLINIC | Age: 77
End: 2022-01-01
Payer: COMMERCIAL

## 2022-01-01 ENCOUNTER — OFFICE VISIT (OUTPATIENT)
Dept: PULMONOLOGY | Facility: CLINIC | Age: 77
End: 2022-01-01
Payer: COMMERCIAL

## 2022-01-01 ENCOUNTER — DOCUMENTATION ONLY (OUTPATIENT)
Dept: OTHER | Facility: CLINIC | Age: 77
End: 2022-01-01

## 2022-01-01 ENCOUNTER — THERAPY VISIT (OUTPATIENT)
Dept: OCCUPATIONAL THERAPY | Facility: CLINIC | Age: 77
End: 2022-01-01
Payer: COMMERCIAL

## 2022-01-01 VITALS
BODY MASS INDEX: 21.35 KG/M2 | DIASTOLIC BLOOD PRESSURE: 74 MMHG | WEIGHT: 153 LBS | HEART RATE: 82 BPM | RESPIRATION RATE: 16 BRPM | SYSTOLIC BLOOD PRESSURE: 129 MMHG | OXYGEN SATURATION: 96 %

## 2022-01-01 VITALS
OXYGEN SATURATION: 91 % | SYSTOLIC BLOOD PRESSURE: 132 MMHG | BODY MASS INDEX: 21.38 KG/M2 | WEIGHT: 153.2 LBS | DIASTOLIC BLOOD PRESSURE: 67 MMHG | HEART RATE: 54 BPM

## 2022-01-01 VITALS
SYSTOLIC BLOOD PRESSURE: 117 MMHG | BODY MASS INDEX: 22.53 KG/M2 | DIASTOLIC BLOOD PRESSURE: 79 MMHG | RESPIRATION RATE: 16 BRPM | WEIGHT: 161.5 LBS | HEART RATE: 90 BPM | OXYGEN SATURATION: 95 %

## 2022-01-01 DIAGNOSIS — F51.01 PRIMARY INSOMNIA: ICD-10-CM

## 2022-01-01 DIAGNOSIS — Z78.9 IMPAIRED MOBILITY AND ADLS: ICD-10-CM

## 2022-01-01 DIAGNOSIS — I48.0 PAROXYSMAL ATRIAL FIBRILLATION (H): Primary | ICD-10-CM

## 2022-01-01 DIAGNOSIS — G12.21 ALS (AMYOTROPHIC LATERAL SCLEROSIS) (H): ICD-10-CM

## 2022-01-01 DIAGNOSIS — G12.21 BRACHIAL AMYOTROPHIC DIPLEGIA (H): Primary | ICD-10-CM

## 2022-01-01 DIAGNOSIS — G47.00 INSOMNIA, UNSPECIFIED TYPE: Primary | ICD-10-CM

## 2022-01-01 DIAGNOSIS — I48.0 PAROXYSMAL ATRIAL FIBRILLATION (H): ICD-10-CM

## 2022-01-01 DIAGNOSIS — Z74.09 IMPAIRED MOBILITY AND ADLS: ICD-10-CM

## 2022-01-01 DIAGNOSIS — G12.21 ALS (AMYOTROPHIC LATERAL SCLEROSIS) (H): Primary | ICD-10-CM

## 2022-01-01 DIAGNOSIS — Z78.9 IMPAIRED INSTRUMENTAL ACTIVITIES OF DAILY LIVING (IADL): ICD-10-CM

## 2022-01-01 DIAGNOSIS — G47.00 INSOMNIA, UNSPECIFIED TYPE: ICD-10-CM

## 2022-01-01 DIAGNOSIS — J96.12 CHRONIC RESPIRATORY FAILURE WITH HYPERCAPNIA (H): Primary | ICD-10-CM

## 2022-01-01 LAB
ERYTHROCYTE [DISTWIDTH] IN BLOOD BY AUTOMATED COUNT: 12.7 % (ref 10–15)
EXPTIME-PRE: 6.58 SEC
FEF2575-%PRED-PRE: 121 %
FEF2575-PRE: 2.83 L/SEC
FEF2575-PRED: 2.34 L/SEC
FEFMAX-%PRED-PRE: 69 %
FEFMAX-PRE: 5.79 L/SEC
FEFMAX-PRED: 8.29 L/SEC
FEV1-%PRED-PRE: 67 %
FEV1-PRE: 2.19 L
FEV1FEV6-PRE: 88 %
FEV1FEV6-PRED: 77 %
FEV1FVC-PRE: 90 %
FEV1FVC-PRED: 75 %
FIFMAX-PRE: 5.1 L/SEC
FVC-%PRED-PRE: 55 %
FVC-PRE: 2.43 L
FVC-PRED: 4.35 L
HCT VFR BLD AUTO: 41.3 % (ref 40–53)
HGB BLD-MCNC: 13.7 G/DL (ref 13.3–17.7)
MCH RBC QN AUTO: 31.7 PG (ref 26.5–33)
MCHC RBC AUTO-ENTMCNC: 33.2 G/DL (ref 31.5–36.5)
MCV RBC AUTO: 96 FL (ref 78–100)
MEP-PRE: 30 CMH2O
MIP-PRE: -39 CMH2O
PLATELET # BLD AUTO: 155 10E3/UL (ref 150–450)
RBC # BLD AUTO: 4.32 10E6/UL (ref 4.4–5.9)
WBC # BLD AUTO: 6.4 10E3/UL (ref 4–11)

## 2022-01-01 PROCEDURE — 97110 THERAPEUTIC EXERCISES: CPT | Mod: GO | Performed by: OCCUPATIONAL THERAPIST

## 2022-01-01 PROCEDURE — 99214 OFFICE O/P EST MOD 30 MIN: CPT | Performed by: PSYCHIATRY & NEUROLOGY

## 2022-01-01 PROCEDURE — 85027 COMPLETE CBC AUTOMATED: CPT

## 2022-01-01 PROCEDURE — 36415 COLL VENOUS BLD VENIPUNCTURE: CPT

## 2022-01-01 PROCEDURE — 97110 THERAPEUTIC EXERCISES: CPT | Mod: GP | Performed by: PHYSICAL THERAPIST

## 2022-01-01 PROCEDURE — 99215 OFFICE O/P EST HI 40 MIN: CPT | Performed by: INTERNAL MEDICINE

## 2022-01-01 PROCEDURE — 93000 ELECTROCARDIOGRAM COMPLETE: CPT | Performed by: INTERNAL MEDICINE

## 2022-01-01 PROCEDURE — 99204 OFFICE O/P NEW MOD 45 MIN: CPT

## 2022-01-01 PROCEDURE — 97162 PT EVAL MOD COMPLEX 30 MIN: CPT | Mod: GP | Performed by: PHYSICAL THERAPIST

## 2022-01-01 PROCEDURE — 94375 RESPIRATORY FLOW VOLUME LOOP: CPT | Performed by: INTERNAL MEDICINE

## 2022-01-01 PROCEDURE — 97535 SELF CARE MNGMENT TRAINING: CPT | Mod: GO | Performed by: OCCUPATIONAL THERAPIST

## 2022-01-01 PROCEDURE — 97166 OT EVAL MOD COMPLEX 45 MIN: CPT | Mod: GO | Performed by: OCCUPATIONAL THERAPIST

## 2022-01-01 RX ORDER — MIRTAZAPINE 15 MG/1
TABLET, FILM COATED ORAL
Qty: 30 TABLET | Refills: 1 | Status: SHIPPED | OUTPATIENT
Start: 2022-01-01 | End: 2022-01-01

## 2022-01-01 RX ORDER — TRAZODONE HYDROCHLORIDE 50 MG/1
TABLET, FILM COATED ORAL
Qty: 90 TABLET | Refills: 1 | Status: SHIPPED | OUTPATIENT
Start: 2022-01-01

## 2022-01-01 RX ORDER — MIRTAZAPINE 15 MG/1
TABLET, FILM COATED ORAL
Qty: 30 TABLET | Refills: 2 | Status: SHIPPED | OUTPATIENT
Start: 2022-01-01

## 2022-01-01 RX ORDER — METOPROLOL SUCCINATE 25 MG/1
25 TABLET, EXTENDED RELEASE ORAL DAILY
Qty: 90 TABLET | Refills: 3 | Status: SHIPPED | OUTPATIENT
Start: 2022-01-01

## 2022-01-01 ASSESSMENT — ENCOUNTER SYMPTOMS
POLYPHAGIA: 0
BACK PAIN: 1
HYPERTENSION: 0
DECREASED APPETITE: 1
HYPOTENSION: 0
FEVER: 0
FATIGUE: 1
HEMOPTYSIS: 0
LEG PAIN: 0
INCREASED ENERGY: 1
JOINT SWELLING: 0
WHEEZING: 0
MUSCLE CRAMPS: 0
MYALGIAS: 1
DYSPNEA ON EXERTION: 1
SYNCOPE: 0
LIGHT-HEADEDNESS: 0
SLEEP DISTURBANCES DUE TO BREATHING: 0
POLYDIPSIA: 0
WEIGHT GAIN: 0
NECK PAIN: 1
MUSCLE WEAKNESS: 1
EXERCISE INTOLERANCE: 1
COUGH DISTURBING SLEEP: 0
WEIGHT LOSS: 1
SNORES LOUDLY: 0
SHORTNESS OF BREATH: 1
SPUTUM PRODUCTION: 0
PALPITATIONS: 1
COUGH: 0
HALLUCINATIONS: 0
STIFFNESS: 0
ALTERED TEMPERATURE REGULATION: 1
CHILLS: 0
POSTURAL DYSPNEA: 0
ARTHRALGIAS: 1
ORTHOPNEA: 0
NIGHT SWEATS: 0

## 2022-01-01 ASSESSMENT — PAIN SCALES - GENERAL
PAINLEVEL: NO PAIN (0)
PAINLEVEL: NO PAIN (0)

## 2022-01-03 ENCOUNTER — ANCILLARY PROCEDURE (OUTPATIENT)
Dept: CARDIOLOGY | Facility: CLINIC | Age: 77
End: 2022-01-03
Attending: INTERNAL MEDICINE
Payer: COMMERCIAL

## 2022-01-03 DIAGNOSIS — R55 SYNCOPE, UNSPECIFIED SYNCOPE TYPE: ICD-10-CM

## 2022-01-03 DIAGNOSIS — I48.0 PAROXYSMAL ATRIAL FIBRILLATION (H): ICD-10-CM

## 2022-01-03 LAB
BI-PLANE LVEF ECHO: NORMAL
LVEF ECHO: NORMAL

## 2022-01-03 PROCEDURE — 93241 XTRNL ECG REC>48HR<7D: CPT | Performed by: INTERNAL MEDICINE

## 2022-01-03 PROCEDURE — C8929 TTE W OR WO FOL WCON,DOPPLER: HCPCS | Performed by: INTERNAL MEDICINE

## 2022-01-03 RX ADMIN — Medication 7 ML: at 11:31

## 2022-01-03 NOTE — PATIENT INSTRUCTIONS
Patient has been prescribed a ZioPatch holter for 7 days.  Patient was instructed regarding the indication, function, care and prompt return of the ZioPatch holter monitor. The monitor, with S/N [id number],  was placed on the patient with instructions regarding care of the skin, electrodes, and monitor, as well as documentation in the patient diary. Patient demonstrated understanding of this information and agreed to call [employer] with further questions or concerns.

## 2022-01-16 ENCOUNTER — HEALTH MAINTENANCE LETTER (OUTPATIENT)
Age: 77
End: 2022-01-16

## 2022-01-17 DIAGNOSIS — G12.21 BRACHIAL AMYOTROPHIC DIPLEGIA (H): Primary | ICD-10-CM

## 2022-01-18 ENCOUNTER — TELEPHONE (OUTPATIENT)
Dept: OCCUPATIONAL THERAPY | Facility: CLINIC | Age: 77
End: 2022-01-18
Payer: COMMERCIAL

## 2022-01-23 NOTE — RESULT ENCOUNTER NOTE
The test result shows expected findings, without any concerning findings.  Have you had any more episodes of passing out?    Dr Jorgensen

## 2022-01-27 ENCOUNTER — OFFICE VISIT (OUTPATIENT)
Dept: NEUROLOGY | Facility: CLINIC | Age: 77
End: 2022-01-27
Payer: COMMERCIAL

## 2022-01-27 DIAGNOSIS — G12.21 ALS (AMYOTROPHIC LATERAL SCLEROSIS) (H): Primary | ICD-10-CM

## 2022-01-27 PROCEDURE — 95885 MUSC TST DONE W/NERV TST LIM: CPT | Mod: GC | Performed by: PSYCHIATRY & NEUROLOGY

## 2022-01-27 PROCEDURE — 95910 NRV CNDJ TEST 7-8 STUDIES: CPT | Mod: GC | Performed by: PSYCHIATRY & NEUROLOGY

## 2022-01-27 PROCEDURE — 95887 MUSC TST DONE W/N TST NONEXT: CPT | Mod: GC | Performed by: PSYCHIATRY & NEUROLOGY

## 2022-01-27 RX ORDER — RILUZOLE 50 MG/1
50 TABLET, FILM COATED ORAL EVERY 12 HOURS
Qty: 180 TABLET | Refills: 1 | Status: SHIPPED | OUTPATIENT
Start: 2022-01-27 | End: 2022-07-25

## 2022-01-27 NOTE — LETTER
2022       RE: Steve Herring  [address] [facility] [address]     Dear Colleague,    Thank you for referring your patient, Steve Herring, to the [facility] EMG CLINIC [place] at [facility]. Please see a copy of my visit note below.                            [facility]  Electrodiagnostic Laboratory                 Department of Neurology                                                                                                         Test Date:  2022    Patient: Steve Herring : 1945 Physician: Shawn Palumbo MD   Sex: Male AGE: 76 year Ref Phys: Shawn Palumbo MD   ID#: [id number]   Technician: Syeda Molina MD     Clinical Information:    76 year old male with history of progressive bilateral upper extremity weakness. Query widespread motor neuron disease    Techniques:    Motor and sensory conduction studies were done with surface recording electrodes. EMG was done with a concentric needle electrode.     Results:    Evaluation of the right Median (APB) motor nerve showed prolonged distal onset latency and reduced amplitude without segmental changes; conduction velocities were normal. The right Ulnar (ADM) motor nerve showed reduced amplitudes without segmental changes, normal distal latency and normal conduction velocities. The right tibial (AHB) and peroneal (EDB) motor studies were normal. The right median and ulnar antidromic sensory nerves showed decreased conduction velocities and normal SNAP amplitudes. The right antidromic radial and sural sensory studies were normal. The right median F wave study showed markedly reduced persistence. The right ulnar F wave latencies were minimally prolonged. The right tibial F wave latencies were normal.    Needle evaluation of the right Tibialis Anterior, Gastrocnemius (Medial Hd), Trapezius (Upper), Thoracic Paraspinals (Mid), First Dorsal  Interosseous, Triceps Brachii, and Deltoid showed Fibs (1+ to 3+) with fasciculations only in the tibialis anterior. The right Tibialis Anterior, Gastrocnemius (Medial Hd), Trapezius (Upper), and Biceps Femoris (Short Hd) showed mildly to moderately reduced recruitment of large amplitude, prolonged duration motor units.  The right triceps and deltoid showed discrete recruitment of normal appearing motor units. The right Vastus Medius showed mildly prolonged duration motor units.     Interpretation:    Abnormal study. There is electrophysiologic evidence of a generalized disorder of the motor neurons and/or their axons. There is evidence of denervation and motor unit remodeling in four body segments (cranial, thoracic, cervical, and lumbar), fulfilling electrodiagnostic [place] criteria for ALS.     Comment:     The results were discussed with the patient and his son at the end of the examination. He was counseled on available medications for ALS, specifically Riluzole and Radicava. Side effects and dosing were discussed. He elected to start on Riluzole at this time, will think about Radicava in the future.     ___________________________  Syeda Molina MD, Neuromuscular Fellow    ATTENDING ADDENDUM: I was present during the entire study and directly supervised Fellow Dr Molina, who performed it. I agree with the analysis of results and interpretation as above, which I personally reviewed and edited. Shawn Palumbo MD        Nerve Conduction Studies  Motor Sites      Latency Amplitude Neg. Amp Diff Segment Distance Velocity Neg. Dur Neg Area Diff Temperature Comment   Site (ms) Norm (mV) Norm %  cm m/s Norm ms %  C    Right Fibular (EDB) Motor   Ankle 4.7  < 6.0 2.3  > 2.0  Ankle-EDB 8   5.4  28.8    Bel Fib Head 11.8 - 2.1 - -8.7 Bel Fib Head-Ankle 33 46  > 38 7.2 -2.9 28.8    Pop Fossa 13.4 - 2.2 - 4.8 Pop Fossa-Bel Fib Head 7 44  > 38 7.2 1.52 28.8    Right Median (APB) Motor   Wrist 4.5  < 4.2 3.0  >  5.0  Wrist-APB 8   5.3  33.6    Elbow 10.1 - 2.7 - -10.0 Elbow-Wrist 28 50  > 48 5.2 -4.5 29.9    Axilla 12.3 - 3.9 - 44.4 Axilla-Elbow 12.5 57 - 5.6 28.6 30.2    Right Tibial (AHB) Motor   Ankle 3.7  < 6.5 6.6  > 4.4  Ankle-AHB 8   6.8  29.4    Knee 13.8 - 3.2 - -51.5 Knee-Ankle 39 39  > 38 8.2 -24.6 28.9    Right Ulnar (ADM) Motor   Wrist 3.0  < 3.5 4.5  > 5.0  Wrist-ADM 8   5.7  29    Bel Elbow 7.4 - 4.0 - -11.1 Bel Elbow-Wrist 23.5 53  > 48 6.2 -2.2 28.9    Abv Elbow 9.4 - 4.0 - 0 Abv Elbow-Bel Elbow 10 50  > 48 6.3 -3.7 28.9    Erb's Pt. 11.5 - 4.0 - 0 Erb's Pt.-Abv Elbow 11 52 - 6.1 -3.8 28.7      F-Wave Sites      Min F-Lat Max-Min F-Lat Mean F-Lat   Site (ms) ms ms   Right Median F-Wave   Wrist 26.2 7.0 30.4   Right Tibial F-Wave   Ankle 26.0 33.2 -   Right Ulnar F-Wave   Wrist 32.3 3.9 34.7     Sensory Sites      Onset Lat Peak Lat Amp (O-P) Amp (P-P) Segment Distance Velocity Temperature   Site ms ms  V Norm  V  cm m/s Norm  C   Right Median Sensory   Wrist-Dig II 3.2 4.2 11  > 10 13 Wrist-Dig II 14 44  > 48 34.4   Right Radial Sensory   Forearm-Wrist 1.65 2.3 20  > 15 21 Forearm-Wrist 10 61 - 33.3   Right Sural Sensory   Calf-Lat Mall 3.2 4.0 7  > 5 9 Calf-Lat Mall 14 44  > 38 29.3   Right Ulnar Sensory   Wrist-Dig V 2.7 3.5 16  > 8 18 Wrist-Dig V 12.5 46  > 48 35.7       Electromyography     Side Muscle Ins Act Fibs/PSW Fasc HF Amp Dur Poly Recrt Int Pat   Right Tib Anterior Nml 1+ 1+ 0 2+ 2+ 0 ModRed Nml   Right Gastroc MH Nml 1+ Nml 0 1+ 1+ 0 Nml Nml   Right Trapezius (Upper) Nml 1+ Nml 0 2+ 2+ 1+ ModRed Nml   Right FDI Nml 3+ Nml 0 Nml 2+ 0 ModRed Nml   Right Thoracic Parasp (Mid) Nml 3+ Nml 0        Right Triceps Nml 3+ Nml 0 Nml Nml 0 Discrete Nml   Right Deltoid Nml 3+ Nml 0 Nml Nml 0 Discrete Nml   Right Biceps Fem SH Nml None Nml 0 1+ 1+ 0 Aurelia Nml   Right Vastus Med Nml None Nml 0 Nml 1+ 0 Nml Nml       NCS Waveforms:    Motor                F-Wave           Sensory                     Shawn Palumbo MD

## 2022-01-27 NOTE — PROGRESS NOTES
[facility]  Electrodiagnostic Laboratory                 Department of Neurology                                                                                                         Test Date:  2022    Patient: Steve Herring : 1945 Physician: Shawn Palumbo MD   Sex: Male AGE: 76 year Ref Phys: Shawn Palumbo MD   ID#: [id number]   Technician: Syeda Molina MD     Clinical Information:    76 year old male with history of progressive bilateral upper extremity weakness. Query widespread motor neuron disease    Techniques:    Motor and sensory conduction studies were done with surface recording electrodes. EMG was done with a concentric needle electrode.     Results:    Evaluation of the right Median (APB) motor nerve showed prolonged distal onset latency and reduced amplitude without segmental changes; conduction velocities were normal. The right Ulnar (ADM) motor nerve showed reduced amplitudes without segmental changes, normal distal latency and normal conduction velocities. The right tibial (AHB) and peroneal (EDB) motor studies were normal. The right median and ulnar antidromic sensory nerves showed decreased conduction velocities and normal SNAP amplitudes. The right antidromic radial and sural sensory studies were normal. The right median F wave study showed markedly reduced persistence. The right ulnar F wave latencies were minimally prolonged. The right tibial F wave latencies were normal.    Needle evaluation of the right Tibialis Anterior, Gastrocnemius (Medial Hd), Trapezius (Upper), Thoracic Paraspinals (Mid), First Dorsal Interosseous, Triceps Brachii, and Deltoid showed Fibs (1+ to 3+) with fasciculations only in the tibialis anterior. The right Tibialis Anterior, Gastrocnemius (Medial Hd), Trapezius (Upper), and Biceps Femoris (Short Hd) showed mildly to moderately reduced recruitment of large amplitude, prolonged duration motor  units.  The right triceps and deltoid showed discrete recruitment of normal appearing motor units. The right Vastus Medius showed mildly prolonged duration motor units.     Interpretation:    Abnormal study. There is electrophysiologic evidence of a generalized disorder of the motor neurons and/or their axons. There is evidence of denervation and motor unit remodeling in four body segments (cranial, thoracic, cervical, and lumbar), fulfilling electrodiagnostic [place] criteria for ALS.     Comment:     The results were discussed with the patient and his son at the end of the examination. He was counseled on available medications for ALS, specifically Riluzole and Radicava. Side effects and dosing were discussed. He elected to start on Riluzole at this time, will think about Radicava in the future.     ___________________________  Syeda Molina MD, Neuromuscular Fellow    ATTENDING ADDENDUM: I was present during the entire study and directly supervised Fellow Dr Molina, who performed it. I agree with the analysis of results and interpretation as above, which I personally reviewed and edited. Shawn Palumbo MD        Nerve Conduction Studies  Motor Sites      Latency Amplitude Neg. Amp Diff Segment Distance Velocity Neg. Dur Neg Area Diff Temperature Comment   Site (ms) Norm (mV) Norm %  cm m/s Norm ms %  C    Right Fibular (EDB) Motor   Ankle 4.7  < 6.0 2.3  > 2.0  Ankle-EDB 8   5.4  28.8    Bel Fib Head 11.8 - 2.1 - -8.7 Bel Fib Head-Ankle 33 46  > 38 7.2 -2.9 28.8    Pop Fossa 13.4 - 2.2 - 4.8 Pop Fossa-Bel Fib Head 7 44  > 38 7.2 1.52 28.8    Right Median (APB) Motor   Wrist 4.5  < 4.2 3.0  > 5.0  Wrist-APB 8   5.3  33.6    Elbow 10.1 - 2.7 - -10.0 Elbow-Wrist 28 50  > 48 5.2 -4.5 29.9    Axilla 12.3 - 3.9 - 44.4 Axilla-Elbow 12.5 57 - 5.6 28.6 30.2    Right Tibial (AHB) Motor   Ankle 3.7  < 6.5 6.6  > 4.4  Ankle-AHB 8   6.8  29.4    Knee 13.8 - 3.2 - -51.5 Knee-Ankle 39 39  > 38 8.2 -24.6 28.9    Right  Ulnar (ADM) Motor   Wrist 3.0  < 3.5 4.5  > 5.0  Wrist-ADM 8   5.7  29    Bel Elbow 7.4 - 4.0 - -11.1 Bel Elbow-Wrist 23.5 53  > 48 6.2 -2.2 28.9    Abv Elbow 9.4 - 4.0 - 0 Abv Elbow-Bel Elbow 10 50  > 48 6.3 -3.7 28.9    Erb's Pt. 11.5 - 4.0 - 0 Erb's Pt.-Abv Elbow 11 52 - 6.1 -3.8 28.7      F-Wave Sites      Min F-Lat Max-Min F-Lat Mean F-Lat   Site (ms) ms ms   Right Median F-Wave   Wrist 26.2 7.0 30.4   Right Tibial F-Wave   Ankle 26.0 33.2 -   Right Ulnar F-Wave   Wrist 32.3 3.9 34.7     Sensory Sites      Onset Lat Peak Lat Amp (O-P) Amp (P-P) Segment Distance Velocity Temperature   Site ms ms  V Norm  V  cm m/s Norm  C   Right Median Sensory   Wrist-Dig II 3.2 4.2 11  > 10 13 Wrist-Dig II 14 44  > 48 34.4   Right Radial Sensory   Forearm-Wrist 1.65 2.3 20  > 15 21 Forearm-Wrist 10 61 - 33.3   Right Sural Sensory   Calf-Lat Mall 3.2 4.0 7  > 5 9 Calf-Lat Mall 14 44  > 38 29.3   Right Ulnar Sensory   Wrist-Dig V 2.7 3.5 16  > 8 18 Wrist-Dig V 12.5 46  > 48 35.7       Electromyography     Side Muscle Ins Act Fibs/PSW Fasc HF Amp Dur Poly Recrt Int Pat   Right Tib Anterior Nml 1+ 1+ 0 2+ 2+ 0 ModRed Nml   Right Gastroc MH Nml 1+ Nml 0 1+ 1+ 0 Nml Nml   Right Trapezius (Upper) Nml 1+ Nml 0 2+ 2+ 1+ ModRed Nml   Right FDI Nml 3+ Nml 0 Nml 2+ 0 ModRed Nml   Right Thoracic Parasp (Mid) Nml 3+ Nml 0        Right Triceps Nml 3+ Nml 0 Nml Nml 0 Discrete Nml   Right Deltoid Nml 3+ Nml 0 Nml Nml 0 Discrete Nml   Right Biceps Fem SH Nml None Nml 0 1+ 1+ 0 Aurelia Nml   Right Vastus Med Nml None Nml 0 Nml 1+ 0 Nml Nml       NCS Waveforms:    Motor                F-Wave           Sensory

## 2022-02-15 ENCOUNTER — HOSPITAL ENCOUNTER (OUTPATIENT)
Dept: OCCUPATIONAL THERAPY | Facility: CLINIC | Age: 77
Setting detail: THERAPIES SERIES
End: 2022-02-15
Attending: PSYCHIATRY & NEUROLOGY
Payer: COMMERCIAL

## 2022-02-15 DIAGNOSIS — G12.21 BRACHIAL AMYOTROPHIC DIPLEGIA (H): ICD-10-CM

## 2022-02-15 PROCEDURE — 97110 THERAPEUTIC EXERCISES: CPT | Mod: GO | Performed by: OCCUPATIONAL THERAPIST

## 2022-02-15 PROCEDURE — 97166 OT EVAL MOD COMPLEX 45 MIN: CPT | Mod: GO | Performed by: OCCUPATIONAL THERAPIST

## 2022-02-15 PROCEDURE — 97535 SELF CARE MNGMENT TRAINING: CPT | Mod: GO | Performed by: OCCUPATIONAL THERAPIST

## 2022-02-15 NOTE — DISCHARGE INSTRUCTIONS
Occupational Therapy Recommendations:    Ervin registered with [employer] of [place], [place], [place] to access assistance programs and equipment programs  See printouts of examples of equipment they have to loan free of charge  Contact your therapist to access the  equipment you need from the association    Prop arms on counter/table to reach face/head more easily for grooming, eating, etc  Wrist supports  Nilay Will request ALSA  Lorton cuff   Rocker knife  Forearm supports x 2  Consider mobile arm support   Shower chair  Dressing stick    Nilay Garcia, OTR/L, [facility] [place] [address]  [email or web address]  Office: [phone] Fax: [phone]    Gender pronouns: she/her

## 2022-02-17 NOTE — PROGRESS NOTES
[facility]          OUTPATIENT OCCUPATIONAL THERAPY  EVALUATION  PLAN OF TREATMENT FOR OUTPATIENT REHABILITATION  (COMPLETE FOR INITIAL CLAIMS ONLY)  Patient's Last Name, First Name, M.I.  YOB: 1945  Steve Herring                        Provider's Name  [facility] Medical Record No.  [id number]                               Onset Date:   1/17/22      Start of Care Date:   2/15/22      Type:     ___PT   _X_OT   ___SLP Medical Diagnosis:   ALS                             OT Diagnosis:   Impaired ADL/IADL and functional mobility    Visits from SOC:  1   _________________________________________________________________________________  Plan of Treatment/Functional Goals:       Goals     Goal Description: Patient, family and/or caregiver will verbalize/demonstrate understanding of at least 3 compensatory methods /equipment to enhance functional independence and safety.  Target Date: 04/13/22        Goal Description: Patient, family and/or caregiver will verbalize/demonstrate understanding of home program for contracture prevention  Target Date: 04/13/22        Goal Description:  Patient, family and/or caregiver will verbalize/demonstrate understanding of positioning techniques/equipment for improved body mechanics and energy management with ADL  Target Date: 04/13/22      POC: see pt for 4 visit 2/15/22-4/13/22    Nilay Garcia OTR/L          I CERTIFY THE NEED FOR THESE SERVICES FURNISHED UNDER        THIS PLAN OF TREATMENT AND WHILE UNDER MY CARE     (Physician co-signature of this document indicates review and certification of the therapy plan).                   Initial Assessment        See Epic Evaluation         Dr. Shawn Palumbo

## 2022-02-28 ENCOUNTER — LAB (OUTPATIENT)
Dept: LAB | Facility: CLINIC | Age: 77
End: 2022-02-28
Payer: COMMERCIAL

## 2022-02-28 DIAGNOSIS — G12.21 ALS (AMYOTROPHIC LATERAL SCLEROSIS) (H): ICD-10-CM

## 2022-02-28 LAB
ALT SERPL W P-5'-P-CCNC: 24 U/L (ref 0–70)
AST SERPL W P-5'-P-CCNC: 16 U/L (ref 0–45)

## 2022-02-28 PROCEDURE — 84460 ALANINE AMINO (ALT) (SGPT): CPT

## 2022-02-28 PROCEDURE — 84450 TRANSFERASE (AST) (SGOT): CPT

## 2022-02-28 PROCEDURE — 36415 COLL VENOUS BLD VENIPUNCTURE: CPT

## 2022-03-04 ENCOUNTER — HOSPITAL ENCOUNTER (OUTPATIENT)
Dept: OCCUPATIONAL THERAPY | Facility: CLINIC | Age: 77
Setting detail: THERAPIES SERIES
End: 2022-03-04
Attending: FAMILY MEDICINE
Payer: COMMERCIAL

## 2022-03-04 DIAGNOSIS — E78.5 HYPERLIPIDEMIA LDL GOAL <130: ICD-10-CM

## 2022-03-04 PROCEDURE — 97110 THERAPEUTIC EXERCISES: CPT | Mod: GO,59 | Performed by: OCCUPATIONAL THERAPIST

## 2022-03-04 PROCEDURE — 97535 SELF CARE MNGMENT TRAINING: CPT | Mod: GO | Performed by: OCCUPATIONAL THERAPIST

## 2022-03-04 PROCEDURE — 97760 ORTHOTIC MGMT&TRAING 1ST ENC: CPT | Mod: GO | Performed by: OCCUPATIONAL THERAPIST

## 2022-03-04 RX ORDER — SIMVASTATIN 20 MG
20 TABLET ORAL EVERY EVENING
Qty: 90 TABLET | Refills: 0 | Status: SHIPPED | OUTPATIENT
Start: 2022-03-04 | End: 2022-04-04

## 2022-03-04 NOTE — TELEPHONE ENCOUNTER
Prescription approved per [facility] Refill Protocol.      Deandra Winter RN  [facility]

## 2022-03-07 DIAGNOSIS — G12.21 BRACHIAL AMYOTROPHIC DIPLEGIA (H): Primary | ICD-10-CM

## 2022-03-10 ENCOUNTER — OFFICE VISIT (OUTPATIENT)
Dept: NEUROLOGY | Facility: CLINIC | Age: 77
End: 2022-03-10
Payer: COMMERCIAL

## 2022-03-10 ENCOUNTER — LAB (OUTPATIENT)
Dept: LAB | Facility: CLINIC | Age: 77
End: 2022-03-10
Payer: COMMERCIAL

## 2022-03-10 ENCOUNTER — TELEPHONE (OUTPATIENT)
Dept: OCCUPATIONAL THERAPY | Facility: CLINIC | Age: 77
End: 2022-03-10

## 2022-03-10 VITALS
HEART RATE: 50 BPM | OXYGEN SATURATION: 96 % | BODY MASS INDEX: 23.48 KG/M2 | DIASTOLIC BLOOD PRESSURE: 70 MMHG | RESPIRATION RATE: 16 BRPM | SYSTOLIC BLOOD PRESSURE: 121 MMHG | WEIGHT: 178 LBS

## 2022-03-10 DIAGNOSIS — G12.21 ALS (AMYOTROPHIC LATERAL SCLEROSIS) (H): ICD-10-CM

## 2022-03-10 DIAGNOSIS — G12.21 ALS (AMYOTROPHIC LATERAL SCLEROSIS) (H): Primary | ICD-10-CM

## 2022-03-10 DIAGNOSIS — G12.21 BRACHIAL AMYOTROPHIC DIPLEGIA (H): ICD-10-CM

## 2022-03-10 DIAGNOSIS — G12.21 BRACHIAL AMYOTROPHIC DIPLEGIA (H): Primary | ICD-10-CM

## 2022-03-10 LAB
ALT SERPL W P-5'-P-CCNC: 23 U/L (ref 0–70)
AST SERPL W P-5'-P-CCNC: 19 U/L (ref 0–45)

## 2022-03-10 PROCEDURE — 99207 PR NO CHARGE LOS: CPT | Performed by: GENETIC COUNSELOR, MS

## 2022-03-10 PROCEDURE — 94375 RESPIRATORY FLOW VOLUME LOOP: CPT | Performed by: INTERNAL MEDICINE

## 2022-03-10 PROCEDURE — 84450 TRANSFERASE (AST) (SGOT): CPT | Performed by: PATHOLOGY

## 2022-03-10 PROCEDURE — 99214 OFFICE O/P EST MOD 30 MIN: CPT | Performed by: PSYCHIATRY & NEUROLOGY

## 2022-03-10 PROCEDURE — 96040 PR GENETIC COUNSELING, EACH 30 MIN: CPT | Performed by: GENETIC COUNSELOR, MS

## 2022-03-10 PROCEDURE — 36415 COLL VENOUS BLD VENIPUNCTURE: CPT | Performed by: PATHOLOGY

## 2022-03-10 PROCEDURE — 84460 ALANINE AMINO (ALT) (SGPT): CPT | Performed by: PATHOLOGY

## 2022-03-10 ASSESSMENT — PAIN SCALES - GENERAL: PAINLEVEL: NO PAIN (0)

## 2022-03-10 NOTE — TELEPHONE ENCOUNTER
Left voice mail for pt to call with chest measurement for shoulder orthosis in preparation for ordering from [facility] for trial at next week's OT visit. Await return call.    Nilay Garcia, OTR/L, [facility] [place] [address]  [email or web address]  Office: [phone] Fax: [phone]    Gender pronouns: she/her

## 2022-03-10 NOTE — PROGRESS NOTES
Service Date: 03/10/2022    Mango Gilliland MD  [address] [address]    RE:  Steve Herring  MRN:  [id number]  :  1945    Dear Dr. Gilliland:    I had the pleasure to see Mr. Herring in followup at the [facility].  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  He does not have any family history of ALS or other motor Neuron Disease.      Compared to the last time, he does not think that a lot has changed other than more weakness on the left hand and arm.  He still can feed himself independently.  He can excrete, go to the bathroom and he can ambulate on his own without problems.  He has not noticed leg weakness.  He has no difficulty getting up from a chair.  He does not trip on his feet or fall.  He has no difficulties turning in bed, and he can even ascend 1 or 2 flights of stairs without major problems.  He denies any cramping or other muscle pain.  He has no dysphagia, dysarthria, sialorrhea, difficulty chewing, head drop, ptosis or other bulbar symptoms.  He has no dyspnea on exertion or orthopnea.      He had an EMG with me on 2022.  It showed denervation not limited to the cervical region but also involving the right mid thoracic, right lower extremity muscles including tibialis anterior and gastrocnemius, and right upper trapezius.  Sensory nerve conduction studies were normal.  Motor nerve conduction studies showed no block.  He had extensive workup for ALS mimics including MRI of the cervical spine and labs including paraneoplastic antibody panel, HIV serologies, serum protein immunofixation, Lyme, GM1 antibodies, CK and sed rate, CRP, which were all negative or normal.  Those were done on  12/16/2021.      He started riluzole at my instruction in December, and he is tolerating it well.  He is taking 50 mg b.i.d.  He had liver function tests, ALT and AST done 02/28/2022 that were normal.  He is not interested in Radicava.  We discussed about ALS genetics and research, and he is interested to learn more about both.    Pulmonary function tests today are satisfactory with a forced vital capacity of 90%, FEV1 90%, MIP -75 cm water, essentially unchanged from his last visit 2 months ago.      /70   Pulse 50   Resp 16   Wt 80.7 kg (178 lb)   SpO2 96%   BMI 23.48 kg/m      On a focused exam, he has normal strength of bilateral hip flexion, hip abduction, adduction, knee extension, knee flexion, and foot dorsiflexion. His neck flexion is 4/5, extension is full.  He has mild weakness of orbicularis oculi.  No weakness of orbicularis oris or cheek puff.  Tongue shows no atrophy and equivocal fasciculations, but fairly fast lateral movements.  There is no jaw jerk.  There is no dysphonia or dysarthria.  Genioglossus and pterygoid strength are actually intact. Leg reflexes are 3+ brisk and symmetric with spread. Upper extremity reflexes are absent.     In summary, Mr. 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  It is not clear that he meets the [place] criteria for ALS, but he does meet the [place] criteria, which were established in 2020, as he has progressive lower motor neuron signs in 3 regions and he has clear weakness in at least 1.  His leg reflexes are brisk as well, and EMG shows denervation in the legs, although there continues to be no overt leg weakness.      He is tolerating riluzole well.  He should recheck liver function tests in about a month. He is interested in ALS related research, and he will learn more from our coordinator.  He would be a good candidate for a number of trials.  He would also like now to know more about ALS genetics, and we will set  up a visit with [facility].  He should see our occupational therapist.  Otherwise, there are no therapy needs currently.  I will see him in followup in 3 months or sooner if needed.    Billing is [facility] level 4, moderate, based on 1) Problems assessed: One chronic disease with progression, exacerbation or side effects of treatment (ALS) and ) Amount/Complexity: moderate due to review of results of 3 or more unique tests as described in the text above.    Sincerely,    Shawn Palumbo MD        D: 03/10/2022   T: 03/10/2022   MT:     Name:     GUCCI KHAN  MRN:      [id number]        Account:      [id number]   :      1945           Service Date: 03/10/2022       Document: [id number]

## 2022-03-10 NOTE — PATIENT INSTRUCTIONS
You will see our occupational therapist, research coordinator and genetic counselor today.  Follow up 3 months      Please call [facility] @ [phone] for questions or concerns during regular business hours. For a more efficient way to communicate, use weeSpring and address the message to your physician. Remember, MyChart is only read during business hours. Do not leave urgent messages on voicemail or Evocalizehart. If situation is urgent, contact the Neurology Clinic @ [phone] and ask to speak to a Triage Nurse or Call 911 or visit an Emergency Department.    Please call your pharmacy if you need a medication refill. They will send us an electronic message.

## 2022-03-10 NOTE — LETTER
3/10/2022       RE: Steve Herring  [address] [address]     Dear Colleague,    Thank you for referring your patient, Steve Herring, to the [facility] [place] [facility]. Please see a copy of my visit note below.    Service Date: 03/10/2022    Mango Gilliland MD  [facility]  [address] [place] [address]    RE:  Steve Herring  MRN:  [id number]  :  1945    Dear Dr. Gilliland:    I had the pleasure to see Mr. Herring in followup at the [facility].  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  He does not have any family history of ALS or other motor Neuron Disease.      Compared to the last time, he does not think that a lot has changed other than more weakness on the left hand and arm.  He still can feed himself independently.  He can excrete, go to the bathroom and he can ambulate on his own without problems.  He has not noticed leg weakness.  He has no difficulty getting up from a chair.  He does not trip on his feet or fall.  He has no difficulties turning in bed, and he can even ascend 1 or 2 flights of stairs without major problems.  He denies any cramping or other muscle pain.  He has no dysphagia, dysarthria, sialorrhea, difficulty chewing, head drop, ptosis or other bulbar symptoms.  He has no dyspnea on exertion or orthopnea.      He had an EMG with me on 2022.  It showed denervation not limited to the cervical region but also involving the right mid thoracic, right lower extremity muscles including tibialis anterior and gastrocnemius, and right upper trapezius.  Sensory nerve conduction studies were normal.   Motor nerve conduction studies showed no block.  He had extensive workup for ALS mimics including MRI of the cervical spine and labs including paraneoplastic antibody panel, HIV serologies, serum protein immunofixation, Lyme, GM1 antibodies, CK and sed rate, CRP, which were all negative or normal.  Those were done on 12/16/2021.      He started riluzole at my instruction in December, and he is tolerating it well.  He is taking 50 mg b.i.d.  He had liver function tests, ALT and AST done 02/28/2022 that were normal.  He is not interested in Radicava.  We discussed about ALS genetics and research, and he is interested to learn more about both.    Pulmonary function tests today are satisfactory with a forced vital capacity of 90%, FEV1 90%, MIP -75 cm water, essentially unchanged from his last visit 2 months ago.      /70   Pulse 50   Resp 16   Wt 80.7 kg (178 lb)   SpO2 96%   BMI 23.48 kg/m      On a focused exam, he has normal strength of bilateral hip flexion, hip abduction, adduction, knee extension, knee flexion, and foot dorsiflexion. His neck flexion is 4/5, extension is full.  He has mild weakness of orbicularis oculi.  No weakness of orbicularis oris or cheek puff.  Tongue shows no atrophy and equivocal fasciculations, but fairly fast lateral movements.  There is no jaw jerk.  There is no dysphonia or dysarthria.  Genioglossus and pterygoid strength are actually intact. Leg reflexes are 3+ brisk and symmetric with spread. Upper extremity reflexes are absent.     In summary, Mr. 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  It is not clear that he meets the [place] criteria for ALS, but he does meet the [place] criteria, which were established in 2020, as he has progressive lower motor neuron signs in 3 regions and he has clear weakness in at least 1.  His leg reflexes are brisk as well, and EMG shows denervation in the legs, although there continues to be no overt leg  weakness.      He is tolerating riluzole well.  He should recheck liver function tests in about a month. He is interested in ALS related research, and he will learn more from our coordinator.  He would be a good candidate for a number of trials.  He would also like now to know more about ALS genetics, and we will set up a visit with .  He should see our occupational therapist.  Otherwise, there are no therapy needs currently.  I will see him in followup in 3 months or sooner if needed.    Billing is [facility] level 4, moderate, based on 1) Problems assessed: One chronic disease with progression, exacerbation or side effects of treatment (ALS) and ) Amount/Complexity: moderate due to review of results of 3 or more unique tests as described in the text above.      Shawn Palumbo MD        D: 03/10/2022   T: 03/10/2022   MT: dean    Name:     GUCCI KHAN  MRN:      [id number]        Account:      [id number]   :      1945           Service Date: 03/10/2022     Document: [id number]

## 2022-03-11 LAB
EXPTIME-PRE: 6.97 SEC
FEF2575-%PRED-PRE: 92 %
FEF2575-PRE: 2.19 L/SEC
FEF2575-PRED: 2.38 L/SEC
FEFMAX-%PRED-PRE: 95 %
FEFMAX-PRE: 8.03 L/SEC
FEFMAX-PRED: 8.4 L/SEC
FEV1-%PRED-PRE: 90 %
FEV1-PRE: 2.98 L
FEV1FEV6-PRE: 74 %
FEV1FEV6-PRED: 77 %
FEV1FVC-PRE: 75 %
FEV1FVC-PRED: 75 %
FIFMAX-PRE: 6.48 L/SEC
FVC-%PRED-PRE: 90 %
FVC-PRE: 3.96 L
FVC-PRED: 4.39 L
MEP-PRE: 70 CMH2O
MIP-PRE: -75 CMH2O

## 2022-03-15 ENCOUNTER — HOSPITAL ENCOUNTER (OUTPATIENT)
Dept: OCCUPATIONAL THERAPY | Facility: CLINIC | Age: 77
Setting detail: THERAPIES SERIES
Discharge: HOME | End: 2022-03-15
Attending: FAMILY MEDICINE
Payer: COMMERCIAL

## 2022-03-15 PROCEDURE — 97110 THERAPEUTIC EXERCISES: CPT | Mod: GO | Performed by: OCCUPATIONAL THERAPIST

## 2022-03-15 PROCEDURE — 97535 SELF CARE MNGMENT TRAINING: CPT | Mod: GO | Performed by: OCCUPATIONAL THERAPIST

## 2022-03-22 NOTE — PROGRESS NOTES
Steve Herring was seen for a genetic counseling appointment at the request of Dr. Palumbo  today given his diagnosis of brachial amyotrophic diplegia. He was accompanied by his son Manuel.    Pertinent Medical History: Steve is a 76 year old male with a history of brachial amyotrophic diplegia. See Dr. Palumbo's note for additional details.     Family History: A three generation pedigree was obtained today and scanned into the EMR. This family history is by patient report only and has not been verified with medical records except where noted. The following information is significant:     Steve has 2 sons.  His son (age 46) alive and well and has healthy daughter (age 18).  His son (age 48) has a history of an ablation for history of  atrial fibrillation, melanoma that was diagnosed in his late teenage years and ADHD.  He has 1 son (age 24) who has ADHD and 1 daughter (age 21) with a history of anxiety.    Steve has 1 brother who passed away at age 68 due to esophageal cancer.  He had one healthy daughter (age 40) and 1 son (age 42) who has a possible history of Duchenne muscular dystrophy but is still ambulatory at age 42.  Steve has 1 sister who  at age 44 due to breast cancer that was diagnosed in her 40s.  She went into remission and then the cancer returned at age 44.  She has 1 daughter who is healthy and in her 30s.  She has 1 daughter who is healthy and in her 20s and who has 3 healthy children.  Steve has 1 sister (age 65) who has a history of depression.  She has 1 healthy daughter who is in her 30s.    Steve's father  at age 77 due to lung cancer and he had a history of smoking.  He had 1 sister who is in her 90s and is healthy.  Paternal history is negative for neurologic, neuromuscular or neurodegenerative diseases.  Gordy paternal grandfather  in his 60s and his paternal grandmother  in her 90s.    Steve's mother  at age 86 after aspiration of food into her lungs.  She had  a history of memory problems that could potentially have been dementia.  She had 1 brother who  in his 70s due to MS.  He had 2 sons who are alive and well.  Gordy maternal grand mother  in her 80s.  Limited information is available regarding Steve's maternal grandfather.    Ancestry is mixed  and Bohemian.  Consanguinity was denied.    Discussion: Brachial amyotrophic diplegia is a variant of ALS with slow progression and severe upper extremity weakness.  Because this condition is a variant of ALS, genetic testing for genes related to ALS is recommended.      There are several genes that have been found to be associated with ALS and/or FTD. The most common genetic cause of ALS is a hexanucleotide repeat expansion (GGGGCC) in the I7rnl64 gene. The number of hexanucleotide repeats in the V2urg05 gene ranges from 2 to >4000. The precise cutoff of between normal and pathogenic (disease causing) is complicated by multiple factors, but generally <25 is considered normal and >60 is considered pathogenic. Repeat expansions in the C6hqj24 gene account for approximately 39-45% of familial cases of ALS and about 3-7% of sporadic ALS. Other genes known to cause ALS include: SOD1, FUS, TARDBP, ANG, OPTN, FIG4, VAPB, UBQLN2, SETX, NEK1, VCP, ALS2, TDP43 (CHCHD10, DCTN1, MATR3, PFN1, TUBA4A, UNC13A, ATXN2).      Genetic forms of ALS are typically inherited in an autosomal dominant pattern, meaning a change on one copy of the gene is sufficient to predispose an individual to the condition. Someone who possesses an ALS gene mutation would have a 1 in 2 (50%) chance of passing the gene mutation associated with ALS on to their children. It is import to know that not all people who inherit an ALS gene mutation will develop ALS. This is called reduced penetrance. For example, the penetrance or percent of individuals who develop ALS who have a mutation in SOD1 ranges from 50% to 90% by age 70, depending on the  specific genetic mutation. It is assumed that other genes will also show reduced penetrance. We discussed that if the gene associated with ALS is NOT passed on; that child is NOT at an increased risk to develop symptoms because of this genetic change and CANNOT pass it on to their children. The only way to know if the genetic change is passed on is by genetic testing.    We discussed the availability of genetic testing for ALS. The gold standard of ALS genetic testing is to begin testing the C1nqf61 gene to determine if an individual has a normal number of hexanucleotide repeats (<25) or an expanded number of hexanucleotide repeats (>60). If that testing identifies normal repeats on both copies of A5atv04, then testing can be expanded to a multi-gene panel to include the other genes associated with ALS. We went on to discuss the details, limitations, and possible outcomes of these multi-gene panels. In particular, we discussed that there are three possible results:    Negative: meaning normal or no mutations are identified in the genes that were tested/sequenced    Positive: meaning a mutation that is known to be associated with a particular set of symptoms is identified    Variant of uncertain significance (VUS): meaning a change in the DNA sequence of a particular gene was seen but there is not enough information or data yet to know if it explains the symptoms. If a VUS is identified, testing of other relatives may be helpful to provide clarification.  In most cases, identification of a VUS does not confirm a diagnosis and does not result in any clinically actionable recommendations.    Even with the growing numbers of genes that have been identified, current clinically available genetic testing identifies a causative mutation in less than 40% families affected with familial ALS. Therefore, most of the time we cannot identify the genetic cause in individuals with ALS. If the results of genetic testing are  negative, this cannot rule out the possibility that there may be an underlying genetic cause or component to an individual's ALS, as it is likely that the genetic cause of all forms of ALS have yet to be discovered. DNA banking is the process in which we can store an individual's DNA almost indefinitely to test at a later time when new genetic tests are available. Additionally, the DNA could be used in research with the participant's consent. This can be done for any genetic condition. It can also be done for conditions which are not thought to be genetic or where there isn't a known family history. Saving the sample may allow for testing as advances are made in diagnosis and treatment.  DNA banking is available through many laboratories including a laboratory called [facility]. Additional information is available upon request.    Pre-symptomatic testing is an option ONLY if a familial mutation is identified in and affected individual. Pre-symptomatic testing is an individual choice and is .  Some people prefer to know so they can be aware of any symptoms and seek appropriate medical care and for family planning.  Other people find it difficult to live in anticipation of the condition and prefer not to know.  Future insurability is often a question for individuals undergoing pre-symptomatic testing.  While there are federal and state laws that protect people from health insurance discrimination, there is not as much regulation on life and disability insurance.  There are many things to consider when contemplating pre-symptomatic testing genetic counseling as well taking to a psychologist can help in the decision making process. As treatments for ALS are developed pre-symptomatic testing may be an avenue to detect those individuals at risk to develop ALS and provide preventative treatment.    Genetic testing for genes related to brachial amyotrophic diplegia and ALS is available through [facility]  via their sponsored program or through insurance.  Risks benefits and limitations of each of these options were reviewed.  Steve expressed an excellent understanding of this information and decided to proceed with the sponsored [facility] ALS panel which includes H0wfg36 repeat expansion analysis.    Plan:  1.   [facility] ALS panel through their sponsored genetic testing program  2. Return pending results of above testing  3. Contact information was provided should any questions arise in the future.     Bianca Domingo [facility]  Genetic Counselor  Division of Genetics and Metabolism  (p) [phone]  (f) [phone]     Total time spent in consultation with the family was approximately 30 minutes    Cc: No Letter

## 2022-03-29 LAB
Lab: NORMAL
PERFORMING LABORATORY: NORMAL
SPECIMEN STATUS: NORMAL
TEST NAME: NORMAL

## 2022-03-30 ENCOUNTER — TELEPHONE (OUTPATIENT)
Dept: NEUROLOGY | Facility: CLINIC | Age: 77
End: 2022-03-30

## 2022-03-30 NOTE — TELEPHONE ENCOUNTER
Contacted Elder to review the results of his genetic testing for ALS (related to his diagnosis of BAD). Left a non detailed VM.    When he returns my call we will review the following:  Our genes are sequences of letters that provide instructions that help our body grow, develop and function. Sometimes a change occurs in one of our genes that causes the body to be unable to read these instructions. This results in a genetic condition.    ALS genetic testing consists of two parts. The first is testing to determine the number of repeats in a gene called D9jje21 and the second is sequencing and deletion/duplication analysis of a panel of genes related to ALS to determine if changes are present.     Steve's genetic testing looked at a gene called I7ivg28 to determine if their is an expanded number of repeats present. This testing was negative or normal. This test identified 10 and 2 repeats (less that 24 is considered normal).     Steve's ALS panel looked for changes in multiple genes related to ALS. This testing was also negative or normal.     Negative genetic testing does not rule out or change a clinical diagnosis of ALS or BAD. It is likely that there are single gene causes of both of these disorders that have not been identified and are not tested for with today's technology.     A copy of Steve's results will be sent via mail. Steve was encouraged to contact me with questions.    Bianca Domingo [facility]  Genetic Counselor  Division of Genetics and Metabolism  (p) [phone]  (f) [phone]

## 2022-04-01 ASSESSMENT — ENCOUNTER SYMPTOMS
WEAKNESS: 1
ARTHRALGIAS: 1

## 2022-04-01 ASSESSMENT — ACTIVITIES OF DAILY LIVING (ADL)
CURRENT_FUNCTION: PREPARING MEALS REQUIRES ASSISTANCE
CURRENT_FUNCTION: BATHING REQUIRES ASSISTANCE

## 2022-04-04 ENCOUNTER — OFFICE VISIT (OUTPATIENT)
Dept: FAMILY MEDICINE | Facility: CLINIC | Age: 77
End: 2022-04-04
Payer: COMMERCIAL

## 2022-04-04 VITALS
SYSTOLIC BLOOD PRESSURE: 130 MMHG | HEART RATE: 83 BPM | RESPIRATION RATE: 10 BRPM | DIASTOLIC BLOOD PRESSURE: 88 MMHG | BODY MASS INDEX: 24.02 KG/M2 | OXYGEN SATURATION: 95 % | HEIGHT: 71 IN | WEIGHT: 171.6 LBS | TEMPERATURE: 98.4 F

## 2022-04-04 DIAGNOSIS — E78.5 HYPERLIPIDEMIA LDL GOAL <130: ICD-10-CM

## 2022-04-04 DIAGNOSIS — Z00.00 ENCOUNTER FOR MEDICARE ANNUAL WELLNESS EXAM: Primary | ICD-10-CM

## 2022-04-04 DIAGNOSIS — G12.21 ALS (AMYOTROPHIC LATERAL SCLEROSIS) (H): ICD-10-CM

## 2022-04-04 DIAGNOSIS — I10 ESSENTIAL HYPERTENSION WITH GOAL BLOOD PRESSURE LESS THAN 140/90: ICD-10-CM

## 2022-04-04 DIAGNOSIS — Z23 NEED FOR VACCINATION: ICD-10-CM

## 2022-04-04 LAB
ALT SERPL W P-5'-P-CCNC: 20 U/L (ref 0–70)
ANION GAP SERPL CALCULATED.3IONS-SCNC: 4 MMOL/L (ref 3–14)
AST SERPL W P-5'-P-CCNC: 14 U/L (ref 0–45)
BUN SERPL-MCNC: 17 MG/DL (ref 7–30)
CALCIUM SERPL-MCNC: 9.6 MG/DL (ref 8.5–10.1)
CHLORIDE BLD-SCNC: 107 MMOL/L (ref 94–109)
CHOLEST SERPL-MCNC: 130 MG/DL
CO2 SERPL-SCNC: 29 MMOL/L (ref 20–32)
CREAT SERPL-MCNC: 0.8 MG/DL (ref 0.66–1.25)
FASTING STATUS PATIENT QL REPORTED: YES
GFR SERPL CREATININE-BSD FRML MDRD: >90 ML/MIN/1.73M2
GLUCOSE BLD-MCNC: 97 MG/DL (ref 70–99)
HDLC SERPL-MCNC: 53 MG/DL
LDLC SERPL CALC-MCNC: 54 MG/DL
NONHDLC SERPL-MCNC: 77 MG/DL
POTASSIUM BLD-SCNC: 4.3 MMOL/L (ref 3.4–5.3)
SODIUM SERPL-SCNC: 140 MMOL/L (ref 133–144)
TRIGL SERPL-MCNC: 117 MG/DL

## 2022-04-04 PROCEDURE — 91305 COVID-19,PF,PFIZER (12+ YRS): CPT | Performed by: FAMILY MEDICINE

## 2022-04-04 PROCEDURE — 99214 OFFICE O/P EST MOD 30 MIN: CPT | Mod: 25 | Performed by: FAMILY MEDICINE

## 2022-04-04 PROCEDURE — 99397 PER PM REEVAL EST PAT 65+ YR: CPT | Performed by: FAMILY MEDICINE

## 2022-04-04 PROCEDURE — 84450 TRANSFERASE (AST) (SGOT): CPT | Performed by: FAMILY MEDICINE

## 2022-04-04 PROCEDURE — 36415 COLL VENOUS BLD VENIPUNCTURE: CPT | Performed by: FAMILY MEDICINE

## 2022-04-04 PROCEDURE — 0054A COVID-19,PF,PFIZER (12+ YRS): CPT | Performed by: FAMILY MEDICINE

## 2022-04-04 PROCEDURE — 84460 ALANINE AMINO (ALT) (SGPT): CPT | Performed by: FAMILY MEDICINE

## 2022-04-04 PROCEDURE — 80048 BASIC METABOLIC PNL TOTAL CA: CPT | Performed by: FAMILY MEDICINE

## 2022-04-04 PROCEDURE — 80061 LIPID PANEL: CPT | Performed by: FAMILY MEDICINE

## 2022-04-04 RX ORDER — SIMVASTATIN 20 MG
20 TABLET ORAL EVERY EVENING
Qty: 90 TABLET | Refills: 0 | Status: SHIPPED | OUTPATIENT
Start: 2022-04-04 | End: 2022-08-30

## 2022-04-04 RX ORDER — LISINOPRIL 10 MG/1
10 TABLET ORAL DAILY
Qty: 90 TABLET | Refills: 1 | Status: SHIPPED | OUTPATIENT
Start: 2022-04-04 | End: 2022-01-01

## 2022-04-04 ASSESSMENT — ENCOUNTER SYMPTOMS
WEAKNESS: 1
ARTHRALGIAS: 1

## 2022-04-04 NOTE — PROGRESS NOTES
"SUBJECTIVE:   CC: Steve Herring is an 76 year old male who presents for preventative health visit. He is accompanied by his wife.       Healthy Habits:     In general, how would you rate your overall health?  Poor    Frequency of exercise:  2-3 days/week    Duration of exercise:  15-30 minutes    Do you usually eat at least 4 servings of fruit and vegetables a day, include whole grains    & fiber and avoid regularly eating high fat or \"junk\" foods?  Yes    Taking medications regularly:  Yes    Medication side effects:  None    Ability to successfully perform activities of daily living:  Preparing meals requires assistance and bathing requires assistance    Home Safety:  No safety concerns identified    Hearing Impairment:  No hearing concerns    In the past 6 months, have you been bothered by leaking of urine?  No    In general, how would you rate your overall mental or emotional health?  Good      PHQ-2 Total Score: 0    Additional concerns today:  No      Hyperlipidemia Follow-Up      Are you regularly taking any medication or supplement to lower your cholesterol?   Yes- simvastatin    Are you having muscle aches or other side effects that you think could be caused by your cholesterol lowering medication?  No    Hypertension Follow-up      Do you check your blood pressure regularly outside of the clinic? Yes     Are your blood pressures ever more than 140 on the top number (systolic) OR more   than 90 on the bottom number (diastolic), for example 140/90? No      Today's PHQ-2 Score:   PHQ-2 ( 1999 Pfizer) 4/1/2022   Q1: Little interest or pleasure in doing things 0   Q2: Feeling down, depressed or hopeless 0   PHQ-2 Score 0   PHQ-2 Total Score (12-17 Years)- Positive if 3 or more points; Administer PHQ-A if positive -   Q1: Little interest or pleasure in doing things Not at all   Q2: Feeling down, depressed or hopeless Not at all   PHQ-2 Score 0       Abuse: Current or Past(Physical, Sexual or Emotional)- No  Do " "you feel safe in your environment? Yes        Social History     Tobacco Use     Smoking status: Never Smoker     Smokeless tobacco: Never Used   Substance Use Topics     Alcohol use: Yes     Comment: seldom         Alcohol Use 4/1/2022   Prescreen: >3 drinks/day or >7 drinks/week? Not Applicable   Prescreen: >3 drinks/day or >7 drinks/week? -       Last PSA:   PSA   Date Value Ref Range Status   02/06/2012 1.41 0 - 4 ug/L Final       Past medical, family, and social histories, medications, and allergies are reviewed and updated in [facility].       Review of Systems   Genitourinary: Positive for impotence. Negative for penile discharge.   Musculoskeletal: Positive for arthralgias.   Neurological: Positive for weakness.         OBJECTIVE:   /88 (BP Location: Left arm, Patient Position: Sitting, Cuff Size: Adult Regular)   Pulse 83   Temp 98.4  F (36.9  C) (Tympanic)   Resp 10   Ht 1.803 m (5' 10.98\")   Wt 77.8 kg (171 lb 9.6 oz)   SpO2 95%   BMI 23.94 kg/m      Physical Exam  GENERAL: healthy, alert and no distress  EYES: Eyes grossly normal to inspection, PERRL and conjunctivae and sclerae normal  HENT: ear canals and TM's normal, nose and mouth without ulcers or lesions  NECK: no adenopathy, no asymmetry, masses, or scars and thyroid normal to palpation  RESP: lungs clear to auscultation - no rales, rhonchi or wheezes  CV: regular rate and rhythm, normal S1 S2, no S3 or S4, no murmur, click or rub, no peripheral edema and peripheral pulses strong  ABDOMEN: soft, nontender, no hepatosplenomegaly, no masses and bowel sounds normal   (male): normal male genitalia without lesions or urethral discharge, no hernia  MS: no gross musculoskeletal defects noted, no edema  SKIN: no suspicious lesions or rashes  NEURO: Normal strength and tone, weakness of arms, sensory exam grossly normal and mentation intact  PSYCH: mentation appears normal, affect normal/bright      ASSESSMENT/PLAN:   (Z00.00) Encounter for " "Medicare annual wellness exam  (primary encounter diagnosis)  Comment: Negative screening exam; up-to-date on preventive services except  Plan: COVID-19,PF,PFIZER (12+ Yrs GRAY LABEL)        F/u 1     (I10) Essential hypertension with goal blood pressure less than 140/90  Comment: well controlled  Plan: lisinopril (ZESTRIL) 10 MG tablet, Basic         metabolic panel        Return in about 6 months (around 10/4/2022) for cholesterol, blood pressure check.      (E78.5) Hyperlipidemia LDL goal <130  Comment: fasting  Plan: simvastatin (ZOCOR) 20 MG tablet, Lipid panel         reflex to direct LDL Non-fasting        Return in about 6 months (around 10/4/2022) for cholesterol, blood pressure check.      (Z23) Need for vaccination  Comment:   Plan: COVID-19,PF,PFIZER (12+ Yrs GRAY LABEL)                  COUNSELING:   Reviewed preventive health counseling, as reflected in patient instructions    Estimated body mass index is 23.94 kg/m  as calculated from the following:    Height as of this encounter: 1.803 m (5' 10.98\").    Weight as of this encounter: 77.8 kg (171 lb 9.6 oz).         He reports that he has never smoked. He has never used smokeless tobacco.      Counseling Resources:  ATP IV Guidelines  Pooled Cohorts Equation Calculator  FRAX Risk Assessment  ICSI Preventive Guidelines  Dietary Guidelines for Americans, 2010  USDA's MyPlate  ASA Prophylaxis  Lung CA Screening    Mango Gilliland MD  [facility]  "

## 2022-04-04 NOTE — PATIENT INSTRUCTIONS
At [facility], we strive to deliver an exceptional experience to you, every time we see you. If you receive a survey, please complete it as we do value your feedback.  If you have MyChart, you can expect to receive results automatically within 24 hours of their completion.  Your provider will send a note interpreting your results as well.   If you do not have MyChart, you should receive your results in about a week by mail.    Your care team:                            Family Medicine Internal Medicine   MD Aurelio Schaefer MD Shantel Branch-Fleming, MD Srinivasa Vaka, MD Katya Belousova, RASHARD Lomas CNP, MD ([facility]   Bret Camarillo, MD Melissa Pena MD Amelia Massimini APRN CNP Kim Thein, APRN CNP Bethany Templen, MD             Clinic hours: Monday - Thursday 7 am-6 pm; Fridays 7 am-5 pm.   Urgent care: Monday - Friday 10 am- 8 pm; Saturday and Sunday 9 am-5 pm.    Clinic: [phone]       [facility]: Monday - Thursday 8 am - 7 pm; Friday 8 am - 6 pm  [facility]: [phone]

## 2022-04-04 NOTE — NURSING NOTE
Prior to immunization administration, verified patients identity using patient s name and date of birth. Please see Immunization Activity for additional information.     Screening Questionnaire for Adult Immunization    Are you sick today?   No   Do you have allergies to medications, food, a vaccine component or latex?   Yes   Have you ever had a serious reaction after receiving a vaccination?   No   Do you have a long-term health problem with heart, lung, kidney, or metabolic disease (e.g., diabetes), asthma, a blood disorder, no spleen, complement component deficiency, a cochlear implant, or a spinal fluid leak?  Are you on long-term aspirin therapy?   Yes   Do you have cancer, leukemia, HIV/AIDS, or any other immune system problem?   No   Do you have a parent, brother, or sister with an immune system problem?   No   In the past 3 months, have you taken medications that affect  your immune system, such as prednisone, other steroids, or anticancer drugs; drugs for the treatment of rheumatoid arthritis, Crohn s disease, or psoriasis; or have you had radiation treatments?   No   Have you had a seizure, or a brain or other nervous system problem?   No   During the past year, have you received a transfusion of blood or blood    products, or been given immune (gamma) globulin or antiviral drug?   No   For women: Are you pregnant or is there a chance you could become       pregnant during the next month?   No   Have you received any vaccinations in the past 4 weeks?   No     Immunization questionnaire was positive for at least one answer.  Notified Dr. Gilliland.        Per orders of Dr. Gilliland, injection of Covid 19 Pfizer given by Diane L. Schoenherr, RN. Patient instructed to remain in clinic for 15 minutes afterwards, and to report any adverse reaction to me immediately.       Screening performed by Diane L. Schoenherr, RN on 4/4/2022 at 10:22 AM.

## 2022-04-08 ENCOUNTER — HOSPITAL ENCOUNTER (OUTPATIENT)
Dept: OCCUPATIONAL THERAPY | Facility: CLINIC | Age: 77
Setting detail: THERAPIES SERIES
Discharge: HOME | End: 2022-04-08
Attending: FAMILY MEDICINE
Payer: COMMERCIAL

## 2022-04-08 PROCEDURE — 97535 SELF CARE MNGMENT TRAINING: CPT | Mod: GO | Performed by: OCCUPATIONAL THERAPIST

## 2022-04-08 PROCEDURE — 97763 ORTHC/PROSTC MGMT SBSQ ENC: CPT | Mod: GO | Performed by: OCCUPATIONAL THERAPIST

## 2022-04-08 PROCEDURE — 97110 THERAPEUTIC EXERCISES: CPT | Mod: GO | Performed by: OCCUPATIONAL THERAPIST

## 2022-04-08 NOTE — PROGRESS NOTES
[facility]    Outpatient Occupational Therapy Discharge Note  Patient: Steve Herring  : 1945    Beginning/End Dates of Reporting Period:  22 to 22    Referring Provider: Dr. 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   Target Date 22   Date Met  22   Progress (detail required for progress note): gait belt, 4ww, shower chair, rocker knife, universal cuff, dressing stick     Goal Identifier     Goal Description Patient, family and/or caregiver will verbalize/demonstrate understanding of home program for contracture prevention   Target Date 22   Date Met  22   Progress (detail required for progress note): wife is helping pt     Goal Identifier     Goal Description  Patient, family and/or caregiver will verbalize/demonstrate understanding of positioning techniques/equipment for improved body mechanics and energy management with ADL   Target Date 22   Date Met  22   Progress (detail required for progress note): Bestic Feeder; positioning for sleeping     Plan:  Discharge from therapy.    Discharge:    Reason for Discharge: Patient has met all goals.    Equipment Issued: Requested 4ww with seat, gait belt from [employer], clamp on forearm supports for typing, shower chair, universal cuff, rocker knife, dressing stick    Discharge Plan: Patient to continue home program. Will plan to follow up with patient in ALS clinic

## 2022-06-21 DIAGNOSIS — G12.21 ALS (AMYOTROPHIC LATERAL SCLEROSIS) (H): Primary | ICD-10-CM

## 2022-06-23 ENCOUNTER — OFFICE VISIT (OUTPATIENT)
Dept: NEUROLOGY | Facility: CLINIC | Age: 77
End: 2022-06-23
Payer: COMMERCIAL

## 2022-06-23 ENCOUNTER — DOCUMENTATION ONLY (OUTPATIENT)
Dept: OTHER | Facility: CLINIC | Age: 77
End: 2022-06-23

## 2022-06-23 ENCOUNTER — PATIENT OUTREACH (OUTPATIENT)
Dept: CARE COORDINATION | Facility: CLINIC | Age: 77
End: 2022-06-23

## 2022-06-23 VITALS
HEART RATE: 99 BPM | WEIGHT: 165 LBS | OXYGEN SATURATION: 95 % | BODY MASS INDEX: 23.02 KG/M2 | RESPIRATION RATE: 16 BRPM | SYSTOLIC BLOOD PRESSURE: 112 MMHG | DIASTOLIC BLOOD PRESSURE: 71 MMHG

## 2022-06-23 DIAGNOSIS — G12.21 ALS (AMYOTROPHIC LATERAL SCLEROSIS) (H): ICD-10-CM

## 2022-06-23 DIAGNOSIS — G12.21 ALS (AMYOTROPHIC LATERAL SCLEROSIS) (H): Primary | ICD-10-CM

## 2022-06-23 PROCEDURE — 99214 OFFICE O/P EST MOD 30 MIN: CPT | Performed by: PSYCHIATRY & NEUROLOGY

## 2022-06-23 PROCEDURE — 94375 RESPIRATORY FLOW VOLUME LOOP: CPT | Performed by: INTERNAL MEDICINE

## 2022-06-23 ASSESSMENT — PAIN SCALES - GENERAL: PAINLEVEL: NO PAIN (0)

## 2022-06-23 NOTE — PROGRESS NOTES
Service Date: 2022    Mango Gilliland MD   [facility]  [address] [id number]    RE:      Steve Herring  MRN:  [id number]  :   1945    Dear Dr. Gilliland:      I had the pleasure to see Elder Herring in followup at the [facility].  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  He began developing upper extremity weakness in late  or 2021 with relative sparing of other regions.  An EMG I did in 2022 did show denervation in the thoracic and lumbar region and trapezius as well.  Extensive lab workup for ALS mimics was negative.  He is on riluzole 50 mg b.i.d. and tolerating it well.  He had liver function tests in 2022 and 2022 that were normal.  He has previously declined IV Radicava, and we discussed oral today.  He was not very excited about this, either, after explaining the benefits of it.  He denies change in his speech.  His son, who escorts him in clinic, says that his voice is a little softer, but there is no overt dysarthria, sialorrhea, difficulty clearing phlegm, head drop, ptosis or diplopia.  He denies dyspnea on exertion, orthopnea or morning headaches.  His sleep is a bit non-refreshing, and he has an erratic sleep pattern and difficulty falling asleep.  He took an over-the-counter prescription that he does not remember, but it did not help. He is not sure if he has tried melatonin.      He denies leg weakness.  He has no difficulties turning in bed, standing or walking.  He has not had any tripping on his foot or falls. His arm weakness is progressing, but he can still bring food to his mouth.  Food must be cut by his wife, though.  His handwriting is not as legible as it used to be.    MEDICATIONS:  Reviewed and are as per Epic record.      He has an active interest in ALS research.      Pulmonary function tests today showed a mild decline  from the last visit.  Today FVC was 74% (previously 90%), FEV1 was 91%, and MIP was -61 cm of water (previously -75 cm of water).    /71   Pulse 99   Resp 16   Wt 74.8 kg (165 lb)   SpO2 95%   BMI 23.02 kg/m      NEUROLOGIC EXAMINATION:  He has minimal weakness of orbicularis oculi bilaterally.  No weakness of orbicularis oris or cheek puff.  Tongue shows equivocal fasciculations, no atrophy, and lateral tongue movements are strong and fast.  There is no dysphonia or dysarthria.  No weakness of lip seal or jaw opening/closure.  Neck flexion and extension strength are full.  He has weakness in the upper extremities as prior, atrophy and fasciculations. In the legs, however, his strength is 5/5 for hip flexion, knee extension, knee flexion, foot dorsiflexion, hip adduction and abduction.    In summary, Mr. 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  He has mild progression from the last visit.  He has a mild decline in his FVC and MIP that indicates some diaphragmatic weakness.  It is not yet concerning, but notable.  He does not have any major bulbar weakness.  He has equivocal tongue fasciculations and no leg weakness.  He does not have many therapy needs.  Unfortunately, our occupational therapist was not in clinic today; she will contact him for an assessment when she is back.     Mr Herring reports a 25 pound weight loss since the onset of this disease, and for that reason, I am going to have our dietitian/nutritionist evaluate him and recommend a high-calorie diet with appropriate supplementation to ideally arrest weight loss.      He will continue riluzole. We discussed Radicava ORS, and he was not very excited; he will think again about this.      He will see our research coordinator for followup as well.      We discussed his advanced directives.  The patient expressed the wish to be DNR/DNI if he has a terminal event with advanced disease.  He is not so sure about a gastrostomy yet. He  would like selective treatment in case of a medical emergency (ok with hospital admission and IV fluids or antibiotics if necessary, but not intubation). He signed a POLST form today in my presence.     Regarding his abnormal sleep pattern, he can try over-the-counter melatonin 3 mg 1 hour before desired bedtime and increase to 5 mg as necessary.    He will follow up in our clinic in 4 months or sooner if needed.  TT spent on this visit today 30 minutes, including 15 face-to-face, 10 in post-visit note dictation, editing, and orders, and 5 in pre-visit chart review.     Sincerely,    Shawn Palumbo MD        D: 2022   T: 2022   MT: lisa    Name:     GUCCI KHAN  MRN:      [id number]        Account:      [id number]   :      1945           Service Date: 2022       Document: [id number]

## 2022-06-23 NOTE — PROGRESS NOTES
Social Work -ALS Clinic Progress Note  [facility]    Data/Intervention:    Patient Name:  Steve Herring  /Age:  1945 (76 year old)    Visit Type: in person    Collaborated With:    -Elder and his son Manuel  -Dr Palumbo and members of the ALS interdisciplinary team    Psychosocial info/Concerns:   Met with Pt and his son Manuel to assess for any psychosocial needs/concerns. He lives with his wife Zuleima who is in good health in their own home. She helps him when needed with ADLs. He is able to eat on his own and mobility is fine but he needs help with showering. They are secure financially and he has a LTC policy.   He has 2 sons and 3 grandchildren as well as an informally adopted dtr who has 3 children.   He would like to complete a health care directive. He and his wife started working on one at home.   He is connected with [facility] and his wife attends a support group.     Intervention/Education/Resources Provided:  Validated the difficulty of coping with this disease. He states he is trying to focus on the things he can do. He has taken some trips with his family which he enjoyed. He is frustrated he can't do the physical things he used to do.  Reviewed the purpose of a health care directive. They wanted a new copy and his son offered to help him complete it at home. They spoke about some of his wishes for life prolonging treatment on the way to the clinic today. Reviewed the POLST document and Pt wanted to complete it today. We filled it out and Dr Palumbo came back to discuss it further. A copy was sent for scanning into his record and he has copies as well.  We discussed finding out how to activate his LTC policy so it's ready when he meets criteria. Discussed skilled insurance covered home care and private pay home care.     Assessment/Plan:  Pt coping ok and has good family support. Will continue to follow in ALS clinic.    Provided patient/family with contact information and  encouraged them to contact me between clinic visits as needed.     BOBY Vinson, [facility]    [facility]  [phone]

## 2022-06-23 NOTE — PATIENT INSTRUCTIONS
Try melatonin for sleep problems, 3 mg one hour before desire bed time, increase to 5 mg if necessary (over the counter)    You will see our dietician for your weight loss today, our research coordinator and our  to discuss advance directives.    Follow up 4 months        Please call [facility] @ [phone] for questions or concerns during regular business hours. For a more efficient way to communicate, use [email or web address] and address the message to your physician. Remember, [employer] is only read during business hours. Do not leave urgent messages on [employer] or [email or web address]. If situation is urgent, contact the Neurology Clinic @ [phone] and ask to speak to a Triage Nurse or Call 911 or visit an Emergency Department.    Please call your pharmacy if you need a medication refill. They will send us an electronic message.

## 2022-06-23 NOTE — LETTER
2022       RE: Steve Herring  [address] [address]     Dear Colleague,    Thank you for referring your patient, Steve Herring, to the [facility] [place] at [facility]. Please see a copy of my visit note below.    Service Date: 2022    Mango Gilliland MD   [facility]  [address] [place] [address]    RE:      Steve Herring  MRN:  [id number]  :   1945    Dear Dr. Gilliland:      I had the pleasure to see Elder Herring in followup at the [facility].  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  He began developing upper extremity weakness in late  or 2021 with relative sparing of other regions.  An EMG I did in 2022 did show denervation in the thoracic and lumbar region and trapezius as well.  Extensive lab workup for ALS mimics was negative.  He is on riluzole 50 mg b.i.d. and tolerating it well.  He had liver function tests in 2022 and 2022 that were normal.  He has previously declined IV Radicava, and we discussed oral today.  He was not very excited about this, either, after explaining the benefits of it.  He denies change in his speech.  His son, who escorts him in clinic, says that his voice is a little softer, but there is no overt dysarthria, sialorrhea, difficulty clearing phlegm, head drop, ptosis or diplopia.  He denies dyspnea on exertion, orthopnea or morning headaches.  His sleep is a bit non-refreshing, and he has an erratic sleep pattern and difficulty falling asleep.  He took an over-the-counter prescription that he does not remember, but it did not help. He is not sure if he has tried melatonin.      He denies leg weakness.  He has no difficulties turning in bed, standing or walking.  He has not had any tripping on his foot or falls. His arm  weakness is progressing, but he can still bring food to his mouth.  Food must be cut by his wife, though.  His handwriting is not as legible as it used to be.    MEDICATIONS:  Reviewed and are as per Epic record.      He has an active interest in ALS research.      Pulmonary function tests today showed a mild decline from the last visit.  Today FVC was 74% (previously 90%), FEV1 was 91%, and MIP was -61 cm of water (previously -75 cm of water).    /71   Pulse 99   Resp 16   Wt 74.8 kg (165 lb)   SpO2 95%   BMI 23.02 kg/m      NEUROLOGIC EXAMINATION:  He has minimal weakness of orbicularis oculi bilaterally.  No weakness of orbicularis oris or cheek puff.  Tongue shows equivocal fasciculations, no atrophy, and lateral tongue movements are strong and fast.  There is no dysphonia or dysarthria.  No weakness of lip seal or jaw opening/closure.  Neck flexion and extension strength are full.  He has weakness in the upper extremities as prior, atrophy and fasciculations. In the legs, however, his strength is 5/5 for hip flexion, knee extension, knee flexion, foot dorsiflexion, hip adduction and abduction.    In summary, Mr. 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  He has mild progression from the last visit.  He has a mild decline in his FVC and MIP that indicates some diaphragmatic weakness.  It is not yet concerning, but notable.  He does not have any major bulbar weakness.  He has equivocal tongue fasciculations and no leg weakness.  He does not have many therapy needs.  Unfortunately, our occupational therapist was not in clinic today; she will contact him for an assessment when she is back.     Mr Herring reports a 25 pound weight loss since the onset of this disease, and for that reason, I am going to have our dietitian/nutritionist evaluate him and recommend a high-calorie diet with appropriate supplementation to ideally arrest weight loss.      He will continue riluzole. We discussed Radicava  ORS, and he was not very excited; he will think again about this.      He will see our research coordinator for followup as well.      We discussed his advanced directives.  The patient expressed the wish to be DNR/DNI if he has a terminal event with advanced disease.  He is not so sure about a gastrostomy yet. He would like selective treatment in case of a medical emergency (ok with hospital admission and IV fluids or antibiotics if necessary, but not intubation). He signed a POLST form today in my presence.     Regarding his abnormal sleep pattern, he can try over-the-counter melatonin 3 mg 1 hour before desired bedtime and increase to 5 mg as necessary.    He will follow up in our clinic in 4 months or sooner if needed.  TT spent on this visit today 30 minutes, including 15 face-to-face, 10 in post-visit note dictation, editing, and orders, and 5 in pre-visit chart review.     Sincerely,    Shawn Palumbo MD        D: 2022   T: 2022   MT: lisa    Name:     GUCCI KHANCharlie  MRN:      [id number]        Account:      [id number]   :      1945           Service Date: 2022       Document: [id number]

## 2022-06-24 ENCOUNTER — ALLIED HEALTH/NURSE VISIT (OUTPATIENT)
Dept: NEUROLOGY | Facility: CLINIC | Age: 77
End: 2022-06-24

## 2022-06-24 LAB
EXPTIME-PRE: 6.74 SEC
FEF2575-%PRED-PRE: 166 %
FEF2575-PRE: 3.97 L/SEC
FEF2575-PRED: 2.38 L/SEC
FEFMAX-%PRED-PRE: 83 %
FEFMAX-PRE: 7.04 L/SEC
FEFMAX-PRED: 8.4 L/SEC
FEV1-%PRED-PRE: 91 %
FEV1-PRE: 3 L
FEV1FEV6-PRE: 92 %
FEV1FEV6-PRED: 77 %
FEV1FVC-PRE: 92 %
FEV1FVC-PRED: 75 %
FIFMAX-PRE: 6.59 L/SEC
FVC-%PRED-PRE: 74 %
FVC-PRE: 3.28 L
FVC-PRED: 4.39 L
MEP-PRE: 47 CMH2O
MIP-PRE: -61 CMH2O

## 2022-06-24 NOTE — PROGRESS NOTES
"CLINICAL NUTRITION SERVICES - ASSESSMENT NOTE    Steve Herring 76 year old referred for MNT related to ALS    Visit Type: Initial  Referring Physician: Dr. Palumbo  Pt accompanied by: Son, Manuel    NUTRITION HISTORY  Factors affecting nutrition intake include:decreased appetite  Current diet: Regular diet  Current appetite/intake: Poor appetite  PEG Tube: N/A    Diet Recall  Breakfast Eggs, coffee cake, fruit, hot chocolate   Lunch Cheese hotdog, pizza, sandwich, hot pocket   Dinner Meat, veggies, fruit, pasta   Snacks N/A   Beverages Coke or lemonade     ANTHROPOMETRICS  Height: 1.803 m (5' 10.98\")    Weight: 74.8 kg (165 lb)  BMI: 23.05 kg/m2  Weight Status:  Normal BMI  IBW: 78.2 kg  % IBW: 96%  Weight History: 16# (9%) wt loss over past 6 months  Wt Readings from Last 10 Encounters:   06/23/22 74.8 kg (165 lb)   04/04/22 77.8 kg (171 lb 9.6 oz)   03/10/22 80.7 kg (178 lb)   12/16/21 82.1 kg (181 lb 1.6 oz)   11/18/21 82.3 kg (181 lb 6.4 oz)   08/31/21 82.7 kg (182 lb 6.4 oz)   02/04/21 88.1 kg (194 lb 5 oz)   12/11/20 86.8 kg (191 lb 4.8 oz)   11/09/20 89.4 kg (197 lb)   04/09/20 88.8 kg (195 lb 12.8 oz)     Dosing Weight: 75 kg    Medications/vitamins/minerals/herbals:   Reviewed    LABS  Labs reviewed    ASSESSED NUTRITION NEEDS:  Estimated Energy Needs: 4256-5880 kcals (30-35 Kcal/Kg)  Justification: increased needs due to ALS  Estimated Protein Needs: 75-90 grams protein (1-1.2 g pro/Kg)  Justification: increased needs due to ALS  Estimated Fluid Needs: 1875 mL   Justification: maintenance    MALNUTRITION:  % Weight Loss:  Weight loss does not meet criteria for malnutrition   % Intake:  </= 75% for >/= 1 month (severe malnutrition)  Subcutaneous Fat Loss:  None observed  Muscle Loss:  None observed  Fluid Retention:  None noted    Malnutrition Diagnosis: Patient does not meet two of the above criteria necessary for diagnosing malnutrition    NUTRITION DIAGNOSIS:  Inadequate oral intake related to decreased " appetite and increased needs associated with progression of ALS as evidenced by pt report and 16# (9%) wt loss over past 6 months.    INTERVENTIONS  Recommendations / Nutrition Prescription  1. Continue Regular diet  2. Add 1-2 ONS/protein bars per day    Nutrition Education     Provided written & verbal education on:   - Ways to maximize kcal and protein intake. Discussed calorie and protein needs for maintenance of weight and nutrition status.  Advised pt to aim for at least 2250 kcal and 75 g protein via 5-6 small frequent meals.  Discussed that as ALS progresses, eating may become more difficult and discussed ways to cope with this.   - ONS (Ensure Enlive, Ensure Plus/Boost Plus, CIB, Benecalorie, Scandi shake etc). Suggested ways to incorporate these supplements to avoid flavor fatigue. Encouraged pt to start consuming 2 ONS daily.     - Sent message to  rep for samples of  oral nutrition shakes.  Shakes will be delivered in 1-2 days. Offered coupon code '[email or web address]' for further shake purchase online.     Provided pt with corresponding education materials/handouts on:  Six Small Meals a Day, High Calorie, High Protein Recipe booklet, Tips to Increase the Calories in Your Diet, Tips to Increase the Protein in Your Diet and Protein Sources.      Pt verbalize understanding of materials provided during consult.   Patient Understanding: Excellent  Expected Compliance: Excellent     Implementation  General/healthful diet and Medical Food Supplement    Goals  1.  Aim for 5-6 small frequent meals  2.  Aim for 2250 kcal and 75 g protein/day  3. Weight maintenance      Follow-Up Plans: Pt has RD contact information for questions.    Pt encouraged to follow up with RD at next clinic visit in 3-6 months.    MONITORING AND EVALUATION:  -Food intake  -Fluid/beverage intake  -Liquid meal replacement or supplement  -Weight trends    America Pendleton RDN, LD

## 2022-07-24 DIAGNOSIS — G12.21 ALS (AMYOTROPHIC LATERAL SCLEROSIS) (H): ICD-10-CM

## 2022-07-25 RX ORDER — RILUZOLE 50 MG/1
TABLET, FILM COATED ORAL
Qty: 180 TABLET | Refills: 1 | Status: SHIPPED | OUTPATIENT
Start: 2022-07-25 | End: 2023-01-01

## 2022-08-14 ENCOUNTER — NURSE TRIAGE (OUTPATIENT)
Dept: NURSING | Facility: CLINIC | Age: 77
End: 2022-08-14

## 2022-08-14 NOTE — TELEPHONE ENCOUNTER
"Patient calling reporting he is COVID 19 positive.   History of ALS.  Reporting increased cough, secretions in past \"couple of days.\"  Denies difficulty breathing, or chest pain/pressure.  Afebrile.    Gather patient reported symptoms   Assessment   Current Symptoms at time of phone call, reported by patient: (if no symptoms, document No symptoms] Cough   Date of Symptom(s) onset (if applicable) 8/12/22     If at time of call, Patients symptoms hare worsened, the Patient should contact 911 or have someone drive them to Emergency Dept promptly:      If Patient calling 911, inform 911 personal that you have tested positive for the Coronavirus (COVID-19).  Place mask on and await 911 to arrive.    If Emergency Dept, If possible, please have another adult drive you to the Emergency Dept but you need to wear mask when in contact with other people.      Monoclonal Antibody Administration    You may be eligible to receive a new treatment with a monoclonal antibody for preventing hospitalization in patients at high risk for complications from COVID-19. This medication is still experimental and available on a limited basis; it is given through an IV and must be given at an infusion center. Please note that not all people who are eligible will receive the medication since it is in limited supply.  Is the patient symptomatic and onset of symptoms within the last 7 days?  Yes  Is the patient interested in a visit with a provider to discuss treatment options?: Yes  Is the patient seen at [facility]?  No: Warm transfer caller to [phone] to be scheduled with a virtual urgent provider.  During transfer, instruct  on appropriate time frame for visit     Review information with Patient    Your result was positive. This means you have COVID-19 (coronavirus).      How can I protect others?    These guidelines are for isolating before returning to work, school or .       If you DO have symptoms:  o Stay " home and away from others  - For at least 5 days after your symptoms started, AND   - You are fever free for 24 hours (with no medicine that reduces fever), AND  - Your other symptoms are better.  o Wear a mask for 10 full days any time you are around others.    If you DON'T have symptoms:  o Stay at home and away from others for at least 5 days after your positive test.  o Wear a mask for 10 full days any time you are around others.    There may be different guidelines for healthcare facilities. Please check with the specific sites before arriving.     If you've been told by a doctor that you were severely ill with COVID-19 or are immunocompromised, you should isolate for at least 10 days.    You should not go back to work until you meet the guidelines above for ending your home isolation. You don't need to be retested for COVID-19 before going back to work--studies show that you won't spread the virus if it's been at least 10 days since your symptoms started (or 20 days, if you have a weak immune system).    Employers, schools, and daycares: This is an official notice for this person's medical guidelines for returning in-person. They must meet the above guidelines before going back to work, school, or  in person.    You will receive a positive COVID-19 letter via [employer] or the mail soon with additional self-care information.      Would you like me to review some of that information with you now?  Yes    How can I take care of myself?      Get lots of rest. Drink extra fluids (unless a doctor has told you not to).      Take Tylenol (acetaminophen) for fever or pain. If you have liver or kidney problems, ask your family doctor if it's okay to take Tylenol.     Take either:     650 mg (two 325 mg pills) every 4 to 6 hours, or     1,000 mg (two 500 mg pills) every 8 hours as needed.     Note: Do not take more than 3,000 mg in one day. Acetaminophen is found in many medicines (both prescribed and  over-the-counter medicines). Read all labels to be sure you don't take too much.    For children, check the Tylenol bottle for the right dose (based on their age or weight).      If you have other health problems (like cancer, heart failure, an organ transplant or severe kidney disease): Call your specialty clinic if you don't feel better in the next 2 days.      Know when to call 911: Emergency warning signs include:    Trouble breathing or shortness of breath    Pain or pressure in the chest that doesn't go away    Feeling confused like you haven't felt before, or not being able to wake up    Bluish-colored lips or face        If you were tested for an upcoming procedure, please contact your provider for next steps.     Alexa Escamilla RN    Reason for Disposition    HIGH RISK for severe COVID complications (e.g., weak immune system, age > 64 years, obesity with BMI > 25, pregnant, chronic lung disease or other chronic medical condition)  (Exception: Already seen by PCP and no new or worsening symptoms.)    Additional Information    Negative: SEVERE difficulty breathing (e.g., struggling for each breath, speaks in single words)    Negative: Difficult to awaken or acting confused (e.g., disoriented, slurred speech)    Negative: Bluish (or gray) lips or face now    Negative: Shock suspected (e.g., cold/pale/clammy skin, too weak to stand, low BP, rapid pulse)    Negative: Sounds like a life-threatening emergency to the triager    Negative: [1] Diagnosed or suspected COVID-19 AND [2] symptoms lasting 3 or more weeks    Negative: [1] COVID-19 exposure AND [2] no symptoms    Negative: COVID-19 vaccine reaction suspected (e.g., fever, headache, muscle aches) occurring 1 to 3 days after getting vaccine    Negative: COVID-19 vaccine, questions about    Negative: [1] Lives with someone known to have influenza (flu test positive) AND [2] flu-like symptoms (e.g., cough, runny nose, sore throat, SOB; with or without  fever)    Negative: [1] Adult with possible COVID-19 symptoms AND [2] triager concerned about severity of symptoms or other causes    Negative: COVID-19 and breastfeeding, questions about    Negative: MODERATE difficulty breathing (e.g., speaks in phrases, SOB even at rest, pulse 100-120)    Negative: SEVERE or constant chest pain or pressure  (Exception: Mild central chest pain, present only when coughing.)    Negative: [1] Headache AND [2] stiff neck (can't touch chin to chest)    Negative: Oxygen level (e.g., pulse oximetry) 90 percent or lower    Negative: Chest pain or pressure    Negative: Patient sounds very sick or weak to the triager    Negative: MILD difficulty breathing (e.g., minimal/no SOB at rest, SOB with walking, pulse <100)    Negative: Fever > 103 F (39.4 C)    Negative: [1] Fever > 101 F (38.3 C) AND [2] age > 60 years    Negative: [1] Fever > 100.0 F (37.8 C) AND [2] bedridden (e.g., nursing home patient, CVA, chronic illness, recovering from surgery)    Protocols used: CORONAVIRUS (COVID-19) DIAGNOSED OR [id number] 1.18.2022

## 2022-08-15 ENCOUNTER — VIRTUAL VISIT (OUTPATIENT)
Dept: PEDIATRICS | Facility: CLINIC | Age: 77
End: 2022-08-15
Payer: COMMERCIAL

## 2022-08-15 DIAGNOSIS — F51.01 PRIMARY INSOMNIA: ICD-10-CM

## 2022-08-15 DIAGNOSIS — U07.1 INFECTION DUE TO 2019 NOVEL CORONAVIRUS: Primary | ICD-10-CM

## 2022-08-15 PROCEDURE — 99213 OFFICE O/P EST LOW 20 MIN: CPT | Mod: CS | Performed by: PHYSICIAN ASSISTANT

## 2022-08-15 RX ORDER — BENZONATATE 200 MG/1
200 CAPSULE ORAL 3 TIMES DAILY PRN
Qty: 20 CAPSULE | Refills: 0 | Status: SHIPPED | OUTPATIENT
Start: 2022-08-15 | End: 2022-01-01

## 2022-08-15 RX ORDER — TRAZODONE HYDROCHLORIDE 50 MG/1
50 TABLET, FILM COATED ORAL AT BEDTIME
Qty: 30 TABLET | Refills: 1 | Status: SHIPPED | OUTPATIENT
Start: 2022-08-15 | End: 2022-09-12

## 2022-08-15 NOTE — PROGRESS NOTES
Steve is a 76 year old who is being evaluated via a billable video visit.      Assessment & Plan     Infection due to 2019 novel coronavirus  Patient meets criteria for monoclonal antibody treatment. Given his neurodegenerative disease, I would like him to get treatment ASAP.   Drug interactions to paxlovid.   Tessalon perles for symptom relief.   - Covid Monoclonal Antibody Referral; Future  - benzonatate (TESSALON) 200 MG capsule; Take 1 capsule (200 mg) by mouth 3 times daily as needed for cough    Primary insomnia  Chronic. Begin trazodone to help with symptoms.   - traZODone (DESYREL) 50 MG tablet; Take 1 tablet (50 mg) by mouth At Bedtime    Cristiano Sykes PA-C  [facility]    Irina Wilson is a 76 year old accompanied by wife, presenting for the following health issues:  No chief complaint on file.      HPI     Patient has a history of ALS.  Vaccinated against covid.     COVID-19 Symptom Review  How many days ago did these symptoms start? Yesterday  Tested POSITIVE yesterday  Are any of the following symptoms significant for you?    New or worsening difficulty breathing? No    Worsening cough? Yes, it's a dry cough. When laying down. No cough when sitting upright    Nasal congestion    Fever or chills? Yes, I felt feverish or had chills.    Headache: YES    Sore throat: No    Chest pain: No    Diarrhea: No    Body aches? No    What treatments has patient tried?advil PM  Does patient live in a nursing home, group home, or shelter? No  Does patient have a way to get food/medications during quarantined? Yes, I have a friend or family member who can help me.  Review of Systems   Constitutional, HEENT, cardiovascular, pulmonary, gi and gu systems are negative, except as otherwise noted.      Objective           Vitals:  No vitals were obtained today due to virtual visit.    Physical Exam   GENERAL: Healthy, alert and no distress  EYES: Eyes grossly normal to inspection.  No  discharge or erythema, or obvious scleral/conjunctival abnormalities.  RESP: No audible wheeze, cough, or visible cyanosis.  No visible retractions or increased work of breathing.    SKIN: Visible skin clear. No significant rash, abnormal pigmentation or lesions.  NEURO: Cranial nerves grossly intact.  Mentation and speech appropriate for age.  PSYCH: Mentation appears normal, affect normal/bright, judgement and insight intact, normal speech and appearance well-groomed.    Video-Visit Details    Video Start Time: 806 am    Type of service:  Video Visit    Video End Time:816 am    Originating Location (pt. Location): Home    Distant Location (provider location):  [facility]     Platform used for Video Visit: [employer]    .  ..

## 2022-08-29 ENCOUNTER — TELEPHONE (OUTPATIENT)
Dept: FAMILY MEDICINE | Facility: CLINIC | Age: 77
End: 2022-08-29

## 2022-08-29 NOTE — TELEPHONE ENCOUNTER
Received form and placed in Dr Augustine Jackson's box for review for Dr Gilliland that is off until 9/6/2022.  [facility]itza James [facility]  2nd Floor  Primary Care

## 2022-08-29 NOTE — TELEPHONE ENCOUNTER
What type of form? disability  What day did you drop off your forms? 8/29/2022  Is there a due date? ASAP (7-10 business day to compete forms)   How would you like to receive these forms? Patient will  at the clinic when completed  Which clinic was the form dropped off at? [facility]    What is the best number to contact you? Cell [phone]  What time works best to contact you with in 4 hrs? ANY  Is it okay to leave a message? Yes     Form in cici Rodriguez

## 2022-08-29 NOTE — TELEPHONE ENCOUNTER
Received signed forms. Bringing to the  by 6:30 pm today, 8/29/2022. Copy to TC and abstracting. Called and spoke to the patient and explained the form . Patient understands.  [facility]itza James [facility]  2nd Floor  Primary Care

## 2022-09-06 DIAGNOSIS — F51.01 PRIMARY INSOMNIA: ICD-10-CM

## 2022-09-12 RX ORDER — TRAZODONE HYDROCHLORIDE 50 MG/1
TABLET, FILM COATED ORAL
Qty: 30 TABLET | Refills: 1 | Status: SHIPPED | OUTPATIENT
Start: 2022-09-12 | End: 2022-01-01

## 2022-10-13 NOTE — PROGRESS NOTES
Service Date: 10/13/2022    Mango Gilliland MD  [facility]  [address] [place]  [address]    RE:  Steve Herring  MRN:  [id number]  :  1945    Dear Dr. Gilliland:    I had the pleasure to see Steve Herring in followup at the [facility] today.  He was escorted by his son.  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  However, more recently, he is developing more signs of typical ALS because he has developed neck extensor weakness, head drop and a decline in his respiratory function.  An EMG I did in 2022 showed denervation in the thoracic and lumbar region and trapezius.  Symptoms began around late  or 2021.      He is on oral riluzole and tolerating it well.  Last liver function tests were 6 months ago, and were normal.  He declined IV and oral Radicava and I talked to him about Relyvrio, the new FDA approved ALS drug today; he is not at all excited about this.      He denies dyspnea on exertion, orthopnea or morning headaches.  He does not have to sleep with the head of the bed elevated.  There are no major respiratory symptoms.  He denies dysphagia, sialorrhea, dysarthria or accumulation of mucus.  He has developed a little neck weakness and head sagging later during the day.  He has purchased a soft collar, which is somewhat helpful.  He does not have prominent pain or cramping.  He has difficulty transferring out of bed for which he needs the help of his wife who lives with him, but once he stands up, he can walk without major problems.  He has not had any falls, and denies footdrop,knees buckling, etc. He does not have problems with stairs.  His wife helps him of the shower.  She has to cut his food.  He can bend over and reach his hand to eat. His wife has to do the shaving and helping with other upper extremity ADLs.      His weight is stable from the last visit.  It  is 160 and it was previously 161.  However, he has lost about 25 pounds since the onset of this disease.  His appetite is relatively poor, but he tries to eat all day to keep his calories up.  He also has an occasional insomnia.      PFT today showed a decline from the last visit that is notable. FVC is 55%, FEV1 67% and MIP -39 cm water.  Three months ago, FVC was 74% and MIP -61.      /79   Pulse 90   Resp 16   Wt 73.3 kg (161 lb 8 oz)   SpO2 95%   BMI 22.53 kg/m      EXAM: He has neck flexion weakness at 4- to 4/5.  Extension is similar.  There is no weakness of orbicularis oculi or oris.  I do not see any tongue fasciculations or atrophy.  Lateral tongue movements are done fast.  There is no dysarthria or dysphonia.  No weakness of cheek puff or jaw.  His leg strength is still 5/5 for hip flexion, knee extension, knee flexion, foot dorsiflexion.  There is no weakness there.    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  The latter behooves us to consider BiPAP.  I recommend NIV/BiPAP because he is developing early signs of neuromuscular respiratory failure and BiPAP will be helpful to consolidate his sleep, but also improve his daytime energy, quality of life and importantly survival.  He is in agreement to a trial.  It is medically necessary (see addendum).    He will continue riluzole.  I am not checking repeat liver function tests today.  He declines Radicava and Relyvrio.      He will be seen by our occupational and physical therapists today.  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 He may need some splinting there.  The head drop can also be discussed with OT regarding orthotics.      He has anorexia and somewhat erratic sleep.  His son asked whether we could prescribe medical marijuana for this.  I told him that usually my initial approach is to prescribe medications  for those symptoms, that are covered by insurance plans, such asmirtazapine. We will start him on 7.5 mg at bedtime and increase to 15 mg after a week.  Side effects of the drug were explained.  If mirtazapine is unsuccessful, we could consider medical cannabis which, unfortunately, is not covered by most insurance plans in [place] and is very expensive, which is the reason it is a second-line choice.      I will see him in followup in 3 months or sooner if needed.  We discussed the prognosis at some length today.     Sincerely,    Shawn Palumbo MD    ADDENDUM (10/24/2022): Non-invasive ventilation is necessary for this patient due to neuromuscular respiratory failure from ALS, as witnessed by his low MIP of -39 cm H2O on spirometry, indicating early diaphragm muscle weakness. Trilogy is preferred for this patient due to portability of the equipment, allowing him to use it more comfortably during the day as the disease progresses, and battery back-up. Shawn Palumbo MD        D: 10/13/2022   T: 10/13/2022   MT: aurelio    Name:     GUCCI KHAN  MRN:      [id number]        Account:      [id number]   :      1945           Service Date: 10/13/2022       Document: [id number]

## 2022-10-13 NOTE — PROGRESS NOTES
"[facility]    OUTPATIENT OCCUPATIONAL THERAPY CLINIC NOTE  Steve Herring  YOB: 1945  [id number]    Type of visit:  Evaluation            Date of service: 10/13/22    Referring provider: Dr. 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 Onset occurred in R UE and then L. Dx with BAD form of ALS 12/16/21.    Cardio-respiratory status:  Forced vital capacity: 55 % of predicted MIP -39    Height/Weight: 6'1\" / 161#    Living environment:  House-split  2 story, railings    Living environment barriers:  1-2 stairs to enter (0 railing present)  Full stairs within home (1 railing present)    Bedroom upstairs     Tub/shower upstairs low toilet   Shower downstairs higher toilet down    Current assistance/living environment:  Lives with spouse, Zuleima    Has grown malick, Manuel and Damian    Current mobility equipment:  Manual w/c with cushion    Current ADL equipment:  Foam neck collar       Technology used: laptop, phone, has douglas control for some items in home    Patient concerns/goals: notes his 4th and 5th fingers are curling in    Evaluation   Interview completed.   Pain assessment:  Pain present  Location: shoulders and knees/Rating: none at rest but present with certain movements due to DJD    Range of motion:  UE " AROM:impaired with mainly active finger flexion and impaired ext at ulnar digits/PROM is intact at elbows and L wrist but R wrist ext is impaired with fingers extended due to Dupuytren's contracture. L composite wrist ext with finger ext limited to 20 degrees due to tightness at 5th digit greatest, Active wrist ext on L at least 3 and R impaired with wrist drop with   Manual muscle testing: impaired-see above, grasp is weak Angelito but fairly functional  Cognition:  Appears intact with pt providing his own history  today     ADL:   Feeding self:  R hand use, help to cut , leans head down to hand  Grooming: stands and Ind  UE Dressing:  Wife assists  LE Dressing:  Wife assists  Showering/bathing: wife assists, pt can do some of lower body  Toileting/transfer:  Assist with clothing  Functional Mobility: stooped posture and mild head drop, Ind, no right ear, does wear soft neck collar out of house, not in home much  Functional communication: WFL verbalizing     IADL:   Home management:  ? spouse  Driving:  NT today  Occupation: retired  Leisure: reading, TV, golf-no longer  Emergency Call system:smartphone      Fall Risk Screen:   Has the patient fallen 2 or more times in the last year? Yes      Has the patient fallen and had an injury in the past year? Yes    Syncope x 2-fell down stairs, no injury and another time in kitchen with injury-in past year   Timed Up and Go Score: NT    Is the patient a fall risk? No, does amb with stooped posture, forward head due to arm weakness Angelito, slowed pace     Impairments:  Fatigue  Muscle atrophy  Coordination  Balance  Pain  Range of motion     Treatment diagnosis:  Impaired activities of daily living  Impaired IADL    Assessment of Occupational Performance: 3-5 Performance Deficits  Identified Performance Deficits (ie: feeding, social skills): feeding self, hygiene, dressing, showering, home management, driving  Clinical Decision Making (Complexity): Moderate  complexity     Recommendations/Plan of care:  Patient would benefit from interventions to enhance safety and independence.  Rehab potential good for stated goals.  Occupational therapy intervention for  self care/home management and therapeutic procedures.  Frequency: one time eval and Rx      Goals:   Target date: today  1. Patient, family and/or caregiver will verbalize/demonstrate understanding of at least 2 compensatory methods /equipment to enhance functional independence and safety.  2. Patient, family and/or caregiver will verbalize/demonstrate understanding of home program for contracture prevention.  3. Patient, family and/or caregiver will verbalize/demonstrate understanding of positioning techniques/equipment for improved body mechanics and energy management with ADL.      Educational assessment/barriers to learning:  Hearing    reatment provided this date:   Self care/home management, 15  minutes of training in:  -options for alternative neck collars to provide grater support and purpose of wearing to reduce neck extensor fatigue and also improve vision/line of site for mobility to prevent falls-[employer] and [place] and purpose of trial with Orthotics referral-pt declines at present but will keep in mind for the future  -splinting recommendation and resource with sizing guidelines for L hand to wear at night as pt preferred this option vs custom fit in hand therapy: [facility] [employer] Hand splint  -wrist splint options for R wrist to support it in neutral position with demonstration of hos this then increases his functional  strength and resources of type and wear to purchase.     Therapeutic procedure, 10 minutes of training in :  Method for PROM/stretches to Angelito UE's elbows, forearms, wrists and hands with handout for PROM issued for spouse to assist pt and demonstration of some exer: elbow flex/ext, wrist/finger composite stretches today. Pt declines shoulder ROM due to DJD and  pain and reports he has not been doing them for this reason.     Response to treatment/recommendations: receptive    Goal attainment:  All goals met    Risks and benefits of evaluation/treatment have been explained.  Patient, family and/or caregiver are in agreement with Plan of Care.     Timed Code Treatment Minutes: 25  Total Treatment Time (sum of timed and untimed services): 35 min    Signature: Nilay Garcia OTR/L   Date: 10/13/2022     [employer]age

## 2022-10-13 NOTE — PROGRESS NOTES
"[facility] Services    OUTPATIENT PHYSICAL THERAPY CLINIC NOTE  Steve Herring  YOB: 1945  [id number]    Type of visit:         Evaluation     Date of service: 10/13/2022    Referring provider: Shawn Palumbo MD     present: No    Others present at visit:  Child(jeanne)- son, Manuel    Medical diagnosis:   Amyotrophic lateral sclerosis (ALS)- BAD    Date of diagnosis: 1/27/2022    Pertinent history of current problem (include personal factors and/or comorbidities that impact the plan of care): ALS diagnosed s/p EMG- BAD variant.    Cardio-respiratory status:  Forced vital capacity: 55 % of predicted     Height/Weight: 5'11\" / 161lbs    Living environment:  House- split level    Living environment barriers:  6 stairs within home (1 railing present)- up and down  Walk out entrance in back of home, but would have to go around home (moderate sized hill)     Current assistance/living environment:  Lives with spouse      Current mobility equipment:  Manual wheelchair- primary mode of mobility outside of home  No device inside home     Current ADL equipment:  See OT note    Technology used: NT    Patient concerns/goals: Stamina is big factor- unsure how long he could walk now; manual w/c for community distances. Going up/down stairs 3-4 times a day without difficulty    Evaluation   Interview completed.   Pain assessment:  Pain denied     Range of motion: B ankle DF neutral     Manual muscle testing:  B ankle DF 4-/5, B knee flexion/extension 5/5   Gait:  Patient ambulates with mild flexed forward posture (mostly at cervical spine), appropriate heel strike and push off B   Cognition:  Intact    Fall Risk Screen:   Has the patient fallen 2 or more times in the last year? No      Has the patient fallen and had an injury in the past year? No       Timed Up and Go Score: N/A    Is the patient a fall risk? " Yes, department fall risk interventions implemented- ALS diagnosis, UE weakness     Impairments:  Fatigue  Muscle atrophy  Range of motion     Treatment diagnosis:  Impaired mobility  Impaired ADLs    Clinical Presentation: Evolving/Changing  Clinical Presentation Rationale: personal factors, body systems involved, progressive nature of ALS  Clinical Decision Making (Complexity): Moderate complexity     Recommendations/Plan of care:  1 session evaluation & treatment.     Goals:   Target date: 10/13/2022  Patient, family and/or caregiver will verbalize understanding of evaluation results and implications for functional performance.  Patient, family and/or caregiver will verbalize/demonstrate understanding of home program.  Patient, family and/or caregiver will verbalize energy management techniques appropriate for status and setting.    Educational assessment/barriers to learning:   No barriers noted     Treatment provided this date:   Therapeutic exercise- 10 minutes  -Educated patient on energy conservation techniques including pacing of activities, frequent rest breaks, use of assistive device and monitoring signs of fatigue (increased muscle soreness, weakness, cramps, fasciculations) for safe functional mobility and performance of daily tasks  -PT performed passive stretch to B gastrocs x 30 seconds B. Encouraged patient to perform at home with wife, twice daily. Issued caregiver assisted stretching program  -Educated patient on delayed balance reactions secondary to ALS and importance of safe negotiation due to BUE weakness which further increases fall risk and injury  -Collaborated on exercise recommendations and use of stationary (recumbant bike)- light resistance. Educated to monitor on red flags and modify if needed    Response to treatment/recommendations: Patient verbalizes understanding/agreement    Goal attainment:  All goals met     Risks and benefits of evaluation/treatment have been  explained.  Patient, family and/or caregiver are in agreement with Plan of Care.     Timed Code Treatment Minutes: 10  Total Treatment Time (sum of timed and untimed services): 20    Signature: Hali Alcazar, PT   Date: 10/13/2022

## 2022-10-13 NOTE — DISCHARGE INSTRUCTIONS
Exercise recommendations with ALS    Resource for exercise recommendations: https://www.als.org/navigating-als/living-with-als/therapies-care/how-to-improve-mobility    If you are new to a form of exercise it is important to start SLOWLY and see how your body responds. Please see the directions below on how to safely exercise with ALS.    Stretching  Maintaining range of motion is important to reduce pain and tightness in weakened muscles. This should be done daily.    Your physical therapist will identify a few key stretches for you to do. Each stretch should be held for 30 secs without bouncing, 2-3 repetitions.      Cardiovascular training  Your physical therapist will help you identify the best mode of exercise for you to perform. This may change over time, so continue to follow recommendations from your therapists.    A good starting point is usually 10 mins at a slow, comfortable pace. Allow a break day in between.   - If you are more fatigued or sore, then it was too much. You need to wait until your symptoms of over-use resolve before trying again. Next time try only 5 mins and see how you feel. If symptoms persist, then likely your body won't tolerate cardiovascular training.  - If you are feeling ok the next day after doing 10 mins of exercise, then you can try it again. Repeat your exercise 3 times before increasing the time by no more than 5 mins.  - Continue to assess your symptoms along the way. If you have increased fatigue or soreness, then you will need to back up to the last level.  - The goal of cardiovascular training is continuous movement. Don't worry about increasing speed, incline, or resistance.   - Every day may be a little different depending on your fatigue and other activities you have been doing. Listen to your body and gauge your exercise routine accordingly.      Strength  Strength training of muscles that are weakened is to be avoided, as it will just over strain them and there is  potential to increase the progression of weakness in these muscles.    If you choose to strength train your non-affected  muscles, it is recommended to use a low level of weight with higher reps (10-20 reps). Make sure you have at least 1 rest day in between strength training sessions. If you notice increase in fatigue or soreness, then what you did was too much, and you should discontinue exercising those muscles.     Cayla Alcazar PT, DPT  Physical Therapist  [facility] [place] - [address]  [address] [place] [address]  [email or web address]  Appointments: [phone]

## 2022-10-13 NOTE — LETTER
10/13/2022       RE: Steve Herring  [address]  [place] [address]     Dear Colleague,    Thank you for referring your patient, Steve Herring, to the [facility] NEUROLOGY [facility]INIC [place] at [facility]. Please see a copy of my visit note below.    Service Date: 10/13/2022    Mango Gilliland MD  [facility]  [address] [place]  [address]    RE:  Steve Herring  MRN:  [id number]  :  1945    Dear Dr. Gilliland:    I had the pleasure to see Steve Herring in followup at the [facility] today.  He was escorted by his son.  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  However, more recently, he is developing more signs of typical ALS because he has developed neck extensor weakness, head drop and a decline in his respiratory function.  An EMG I did in 2022 showed denervation in the thoracic and lumbar region and trapezius.  Symptoms began around late  or 2021.      He is on oral riluzole and tolerating it well.  Last liver function tests were 6 months ago, and were normal.  He declined IV and oral Radicava and I talked to him about Relyvrio, the new FDA approved ALS drug today; he is not at all excited about this.      He denies dyspnea on exertion, orthopnea or morning headaches.  He does not have to sleep with the head of the bed elevated.  There are no major respiratory symptoms.  He denies dysphagia, sialorrhea, dysarthria or accumulation of mucus.  He has developed a little neck weakness and head sagging later during the day.  He has purchased a soft collar, which is somewhat helpful.  He does not have prominent pain or cramping.  He has difficulty transferring out of bed for which he needs the help of his wife who lives with him, but once he stands up, he can walk without major problems.  He has not had  any falls, and denies footdrop,knees buckling, etc. He does not have problems with stairs.  His wife helps him of the shower.  She has to cut his food.  He can bend over and reach his hand to eat. His wife has to do the shaving and helping with other upper extremity ADLs.      His weight is stable from the last visit.  It is 160 and it was previously 161.  However, he has lost about 25 pounds since the onset of this disease.  His appetite is relatively poor, but he tries to eat all day to keep his calories up.  He also has an occasional insomnia.      PFT today showed a decline from the last visit that is notable. FVC is 55%, FEV1 67% and MIP -39 cm water.  Three months ago, FVC was 74% and MIP -61.      /79   Pulse 90   Resp 16   Wt 73.3 kg (161 lb 8 oz)   SpO2 95%   BMI 22.53 kg/m      EXAM: He has neck flexion weakness at 4- to 4/5.  Extension is similar.  There is no weakness of orbicularis oculi or oris.  I do not see any tongue fasciculations or atrophy.  Lateral tongue movements are done fast.  There is no dysarthria or dysphonia.  No weakness of cheek puff or jaw.  His leg strength is still 5/5 for hip flexion, knee extension, knee flexion, foot dorsiflexion.  There is no weakness there.    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  The latter behooves us to consider BiPAP.  I recommend NIV/BiPAP because he is developing early signs of neuromuscular respiratory failure and BiPAP will be helpful to consolidate his sleep, but also improve his daytime energy, quality of life and importantly survival.  He is in agreement to a trial.  It is medically necessary.      He will continue riluzole.  I am not checking repeat liver function tests today.  He declines Radicava and Relyvrio.      He will be seen by our occupational and physical therapists today.  He has a contracture  in the left hand. He may need some splinting there.  The head drop can also be discussed with OT regarding orthotics.      He has anorexia and somewhat erratic sleep.  His son asked whether we could prescribe medical marijuana for this.  I told him that usually my initial approach is to prescribe medications for those symptoms, that are covered by insurance plans, such asmirtazapine. We will start him on 7.5 mg at bedtime and increase to 15 mg after a week.  Side effects of the drug were explained.  If mirtazapine is unsuccessful, we could consider medical cannabis which, unfortunately, is not covered by most insurance plans in [place] and is very expensive, which is the reason it is a second-line choice.      I will see him in followup in 3 months or sooner if needed.  We discussed the prognosis at some length today.       D: 10/13/2022   T: 10/13/2022   MT: aurelio    Name:     GUCCI KHAN  MRN:      [id number]        Account:      [id number]   :      1945           Service Date: 10/13/2022     Document: [id number]        Again, thank you for allowing me to participate in the care of your patient.      Sincerely,    Shawn Palumbo MD

## 2022-10-13 NOTE — PATIENT INSTRUCTIONS
OT and PT will see you today  We recommend starting BiPAP at night. We will work with [employer] to get this approved by your insurance and then a device will be mailed to you and a representative of the company will help you test the fitting, etc    Start mirtazapine 7.5 mg at bedtime- it helps sleep and appetite. Increase to 15 mg after one week.   Once you start mirtazapine I recommend stopping trazodone  Follow up 3 months

## 2022-11-03 NOTE — TELEPHONE ENCOUNTER
XARELTO 20 MG TABLET  Last Written Prescription Date:   12/8/2021  Last Fill Quantity: 90,   # refills: 3  Last Office Visit :  12/10/2021  Future Office visit:   12/2/2022    Routing refill request to provider for review/approval because:  Abnormal PLT's  Refer to clinic/provider for review   Recent Labs   Lab Test 08/31/21  1753   *         Dahiana Auguste RN  Central Triage Red Flags/Med Refills

## 2022-11-03 NOTE — TELEPHONE ENCOUNTER
Martin Jorgensen MD  [facility] [place]; Dahiana Auguste RN 7 minutes ago (10:20 AM)     PN  Renewed. Needs cbc - please order.      Lab ordered, will plan to do at appt next month  Milly Hanks RN

## 2022-12-01 NOTE — PROGRESS NOTES
[facility] Cardiology Consultation:    Assessment and Plan:     1. Persistent atrial fibrillation/flutter, [id number] score of 3-4, LVEF 50 to 55%, sinus bradycardia  Lifelong anticoagulation to decrease stroke risk, he is on Xarelto.  Start Toprol-XL 25 mg due to tachycardia  Check echo to assess LV function      2. Hypertension --controlled on lisinopril.  Asked to monitor blood pressure and heart rate at home and let me know via MyChart in a few weeks.  May need to stop lisinopril     3. Hyperlipidemia   Stop simvastatin to reduce pill burden     4.  Mild cardiomyopathy, LVEF 50 to 55%, NYHA class I  Etiology is likely A. fib.    No signs of volume overload, dyspnea likely related to A. fib with rapid rates  Check echo     5. Mild ADELA-- on CPAP.       6.  Syncope: Unclear etiology, no recurrence    7.  ALS: He requests simplification of his medication regimen    A total of 40 minutes were spent on the day of the visit for chart review, care coordination, face-to-face consultation with the patient, and documentation.    Martin Jorgensen MD    Cardiac Imaging and Prevention  [facility]  [email or web address] Pager: [phone]    HPI: A. fib routine follow-up.  He was unfortunately diagnosed with ALS over the past year.  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  Understandably this has had significant impact on his life.  I reviewed his echocardiogram that was done earlier this year.  It showed stable LV function with LVEF in the 50 to 55% range, with no other abnormality.  I reviewed his Zio patch that was done earlier this year to evaluate an episode of syncope.  It showed paroxysmal A. fib with 9% burden, frequent long episodes, largely rate controlled.  There was short run of a wide-complex tachycardia.  There was no concerning bradycardia or pauses.  Basic labs are normal.  He is tolerating all his medications without any issues.  I reviewed his EKG that was done  in clinic today.  It shows atrial fibrillation with rapid rates at 117 bpm.  Review of heart rates at recent office visits suggest that he is likely been in A. fib.  CBC checked today is normal.  He thinks he can tell when he is in A. fib, though currently he cannot feel it.  He complains of feeling short of breath.     EXAM:  /67 (BP Location: Left arm, Patient Position: Sitting, Cuff Size: Adult Regular)   Pulse 54   Wt 69.5 kg (153 lb 3.2 oz)   SpO2 91%   BMI 21.38 kg/m    GEN/CONSTITUIONAL: Appears comfortable, in no apparent distress   EYES: No icterus  ENT/MOUTH: Normal  JVP:  Not visible  RESPIRATORY: Clear to auscultation bilaterally   CARDIOVASCULAR: irregular, no murmurs, rubs, or gallops.   ABDOMEN: Soft, non-tender, positive bowel sounds   NEUROLOGIC: bilateral arm weakness   PSYCHIATRIC: Normal affect  EXT: No cyanosis, clubbing, edema. Normal pedal pulses.  Skin: No petechiae, purpura or rash    PAST MEDICAL HISTORY:  Past Medical History:   Diagnosis Date     Arthritis 01/01/2010     Colon polyps 01/01/2003    colonoscopy due Jan 2012     Dupuytren's contracture ~2000    RT>LT     Erectile dysfunction      Essential hypertension, benign 01/01/2003     Hyperlipidemia LDL goal <130      MMT (medial meniscus tear) 01/01/2004    RT     Paroxysmal atrial fibrillation (H)        CURRENT MEDICATIONS:  Current Outpatient Medications   Medication     benzonatate (TESSALON) 200 MG capsule     lisinopril (ZESTRIL) 10 MG tablet     mirtazapine (REMERON) 15 MG tablet     riluzole (RILUTEK) 50 MG tablet     rivaroxaban ANTICOAGULANT (XARELTO ANTICOAGULANT) 20 MG TABS tablet     simvastatin (ZOCOR) 20 MG tablet     traZODone (DESYREL) 50 MG tablet     No current facility-administered medications for this visit.     Facility-Administered Medications Ordered in Other Visits   Medication     sodium chloride (PF) 0.9% PF flush 10 mL       PAST SURGICAL HISTORY:  Past Surgical History:   Procedure Laterality  Date     CATARACT IOL, RT/LT  12/18/08    RT     CATARACT IOL, RT/LT  11/13/13    LT, Dr. Carvajal     COLONOSCOPY       HC PNEUMATIC RETINOPEXY  3/17/05    Dr. Steve Hebert     HC REMOVE TONSILS/ADENOIDS,12+ Y/O         ALLERGIES     Allergies   Allergen Reactions     Amoxicillin Hives     Also angioedema     Penicillin G Hives       FAMILY HISTORY:  Family History   Problem Relation Age of Onset     Diabetes Mother         elderly Type 2 borderline     C.A.D. Father         stented     Hypertension Father      Melanoma Son      Heart Disease Son         ablation for reentrant tachycardia     Esophageal Cancer Brother 66     Breast Cancer Sister 40     Cancer - colorectal No family hx of      Anesthesia Reaction No family hx of      Blood Disease No family hx of      Thrombophilia No family hx of        SOCIAL HISTORY:  Social History     Socioeconomic History     Marital status:      Spouse name: Not on file     Number of children: 2     Years of education: Not on file     Highest education level: Not on file   Occupational History     Employer: [employer]   Tobacco Use     Smoking status: Never     Smokeless tobacco: Never   Substance and Sexual Activity     Alcohol use: Yes     Comment: seldom     Drug use: No     Sexual activity: Yes     Partners: Female     Birth control/protection: None   Other Topics Concern     Parent/sibling w/ CABG, MI or angioplasty before 65F 55M? No   Social History Narrative     Not on file     Social Determinants of Health     Financial Resource Strain: Not on file   Food Insecurity: Not on file   Transportation Needs: Not on file   Physical Activity: Not on file   Stress: Not on file   Social Connections: Not on file   Intimate Partner Violence: Not on file   Housing Stability: Not on file       ROS:   Constitutional: No fever, chills, or sweats. No weight gain/loss   ENT: No visual disturbance, ear ache, epistaxis, sore throat  Allergies/Immunologic:  Negative.   Respiratory: No cough, hemoptysia  Cardiovascular: As per HPI  GI: No nausea, vomiting, hematemesis, melena, or hematochezia  : No urinary frequency, dysuria, or hematuria  Integument: Negative  Psychiatric: Negative  Neuro: Negative  Endocrinology: Negative   Musculoskeletal: Negative    ADDITIONAL COMMENTS:     I reviewed the patient's medications:     I reviewed the patient's pertinent clinical laboratory studies:     I reviewed the patient's pertinent imaging studies:   I reviewed the patient's ECG:

## 2022-12-02 NOTE — PATIENT INSTRUCTIONS
Start Toprol 25 mg every day  Stop Zocor  Send BP and HR numbers in 2-3 weeks  Schedule echo to coincide with [place] [facility]

## 2022-12-02 NOTE — PROGRESS NOTES
Steve Herring's goals for this visit include:     He requests these members of his care team be copied on today's visit information: PCP    PCP: Mango Gilliland    Referring Provider:  No referring provider defined for this encounter.    /67 (BP Location: Left arm, Patient Position: Sitting, Cuff Size: Adult Regular)   Pulse 54   Wt 69.5 kg (153 lb 3.2 oz)   SpO2 91%   BMI 21.38 kg/m      Do you need any medication refills at today's visit? No.    Raghav Rodriguez, EMT  Clinic Support  [facility]    [phone]    Employed by [facility] [employer] [facility] [employer]

## 2022-12-15 NOTE — PROGRESS NOTES
Pulmonary outpatient visit    December 15, 2022    S: Steve Herring is being seen today at the request of Dr. Palumbo for evaluation of restrictive lung disease in the setting of ALS. Symptoms began in late 2020 and diagnosis was made in March of this year. He most recently saw Dr. Palumbo in October and was started on NIV (Reliable). He has struggled to sleep with this - he is chronically a poor sleeper. Also, the mask interface that he has is broken, and it is quite painful for him to wear for more than a couple of hours. They have a call in to RT to get a new one (as of yesterday).     In the past couple of months, he has developed progressively worsening LUNDBERG. He is a former runner- always fit without difficulty getting around, so this is quite frustrating for him. No chest pain, cough or chest congestion.     He was recently diagnosed with Afib and was started on metoprolol - no improvement in his dyspnea. No LE edema.    Answers for HPI/ROS submitted by the patient on 12/8/2022  General Symptoms: Yes  Skin Symptoms: No  HENT Symptoms: No  EYE SYMPTOMS: No  HEART SYMPTOMS: Yes  LUNG SYMPTOMS: Yes  INTESTINAL SYMPTOMS: No  URINARY SYMPTOMS: No  REPRODUCTIVE SYMPTOMS: No  SKELETAL SYMPTOMS: Yes  BLOOD SYMPTOMS: No  NERVOUS SYSTEM SYMPTOMS: No  MENTAL HEALTH SYMPTOMS: No  Fever: No  Loss of appetite: Yes  Weight loss: Yes  Weight gain: No  Fatigue: Yes  Night sweats: No  Chills: No  Increased stress: No  Excessive hunger: No  Excessive thirst: No  Feeling hot or cold when others believe the temperature is normal: Yes  Loss of height: Yes  Post-operative complications: No  Surgical site pain: Yes  Hallucinations: No  Change in or Loss of Energy: Yes  Hyperactivity: No  Confusion: No  Chest pain or pressure: No  Fast or irregular heartbeat: Yes  Pain in legs with walking: No  Trouble breathing while lying down: No  Fingers or toes appear blue: No  High blood pressure: No  Low blood pressure: No  Fainting:  No  Murmurs: No  Pacemaker: No  Varicose veins: No  Edema or swelling: No  Wake up at night with shortness of breath: No  Light-headedness: No  Exercise intolerance: Yes  Cough: No  Sputum or phlegm: No  Coughing up blood: No  Difficulty breating or shortness of breath: Yes  Snoring: No  Wheezing: No  Difficulty breathing on exertion: Yes  Nighttime Cough: No  Difficulty breathing when lying flat: No  Back pain: Yes  Muscle aches: Yes  Neck pain: Yes  Swollen joints: No  Joint pain: Yes  Bone pain: No  Muscle cramps: No  Muscle weakness: Yes  Joint stiffness: No  Bone fracture: No        Current Outpatient Medications   Medication Sig Dispense Refill     lisinopril (ZESTRIL) 10 MG tablet TAKE 1 TABLET BY MOUTH DAILY FOR BLOOD PRESSURE 90 tablet 1     metoprolol succinate ER (TOPROL XL) 25 MG 24 hr tablet Take 1 tablet (25 mg) by mouth daily 90 tablet 3     mirtazapine (REMERON) 15 MG tablet TAKE ONE TABLET AT BEDTIME. 30 tablet 2     riluzole (RILUTEK) 50 MG tablet TAKE 1 TABLET BY MOUTH EVERY 12 HOURS 180 tablet 1     rivaroxaban ANTICOAGULANT (XARELTO ANTICOAGULANT) 20 MG TABS tablet Take 1 tablet (20 mg) by mouth daily (with dinner) 90 tablet 3     traZODone (DESYREL) 50 MG tablet TAKE 1 TABLET BY MOUTH EVERYDAY AT BEDTIME 90 tablet 1       Social Hx:  Here with his son. Lifetime nonsmoker. Father  from lung cancer at age 77    Family Hx:   Family History   Problem Relation Age of Onset     Diabetes Mother         elderly Type 2 borderline     C.A.D. Father         stented     Hypertension Father      Melanoma Son      Heart Disease Son         ablation for reentrant tachycardia     Esophageal Cancer Brother 66     Breast Cancer Sister 40     Cancer - colorectal No family hx of      Anesthesia Reaction No family hx of      Blood Disease No family hx of      Thrombophilia No family hx of        O: Alert, appears comfortable in general - mild resp acc muscle use when speaking in full sentences.  VS: /74    Pulse 82   Resp 16   Wt 69.4 kg (153 lb)   SpO2 96%   BMI 21.35 kg/m    HEENT: NC/AT, no icterus  Neck: No HARIS  Lungs: CTA bilaterally, no wheezes or crackles  Cor: RRR S1S2, no murmurs  Extr: No clubbing/cyanosis/edema  Neuro: Alert, speech fluent, no facial droop    Spirometry was performed 10/13/22 and is personally reviewed: FEV1 was 2.19 L or 67% predicted. FVC was 2.43 L or 55 % predicted. MIP was reduced at -39 cwp.      A/P:     1) ALS with restrictive lung disease and daytime dyspnea  - Not hypoxic - discussed that supplemental O2 is unlikely to be of benefit and may cause issues with CO2 retention  - No wheezing, cough/congestion to suggest that nebs would help  - Afib may be contributing to symptoms - currently seems in NSR and on low-dose metoprolol  - Discussed that ongoing NIV use is key. Will get overnight oximetry to see if he has significant desaturations - if he does, it will be more important for him to use at night (but also would be more likely to be of daytime benefit)  - I'll call him (he requests I call his wife Zuleima [phone]) when I see results of the oximetry      Shamika Flanagan MD    Dept of Pulmonary, Sleep and Critical Care Medicine

## 2022-12-15 NOTE — LETTER
12/15/2022       RE: Steve Herring  [address]  [place] [address]     Dear Colleague,    Thank you for referring your patient, Steve Herring, to the [facility] NEUROLOGY [facility] [place] [facility]. Please see a copy of my visit note below.    Pulmonary outpatient visit    December 15, 2022    S: Steve Herring is being seen today at the request of Dr. Palumbo for evaluation of restrictive lung disease in the setting of ALS. Symptoms began in late 2020 and diagnosis was made in March of this year. He most recently saw Dr. Palumbo in October and was started on NIV (Reliable). He has struggled to sleep with this - he is chronically a poor sleeper. Also, the mask interface that he has is broken, and it is quite painful for him to wear for more than a couple of hours. They have a call in to RT to get a new one (as of yesterday).     In the past couple of months, he has developed progressively worsening LUNDBERG. He is a former runner- always fit without difficulty getting around, so this is quite frustrating for him. No chest pain, cough or chest congestion.     He was recently diagnosed with Afib and was started on metoprolol - no improvement in his dyspnea. No LE edema.    Answers for HPI/ROS submitted by the patient on 12/8/2022  General Symptoms: Yes  Skin Symptoms: No  HENT Symptoms: No  EYE SYMPTOMS: No  HEART SYMPTOMS: Yes  LUNG SYMPTOMS: Yes  INTESTINAL SYMPTOMS: No  URINARY SYMPTOMS: No  REPRODUCTIVE SYMPTOMS: No  SKELETAL SYMPTOMS: Yes  BLOOD SYMPTOMS: No  NERVOUS SYSTEM SYMPTOMS: No  MENTAL HEALTH SYMPTOMS: No  Fever: No  Loss of appetite: Yes  Weight loss: Yes  Weight gain: No  Fatigue: Yes  Night sweats: No  Chills: No  Increased stress: No  Excessive hunger: No  Excessive thirst: No  Feeling hot or cold when others believe the temperature is normal: Yes  Loss of height: Yes  Post-operative complications: No  Surgical site pain:  Yes  Hallucinations: No  Change in or Loss of Energy: Yes  Hyperactivity: No  Confusion: No  Chest pain or pressure: No  Fast or irregular heartbeat: Yes  Pain in legs with walking: No  Trouble breathing while lying down: No  Fingers or toes appear blue: No  High blood pressure: No  Low blood pressure: No  Fainting: No  Murmurs: No  Pacemaker: No  Varicose veins: No  Edema or swelling: No  Wake up at night with shortness of breath: No  Light-headedness: No  Exercise intolerance: Yes  Cough: No  Sputum or phlegm: No  Coughing up blood: No  Difficulty breating or shortness of breath: Yes  Snoring: No  Wheezing: No  Difficulty breathing on exertion: Yes  Nighttime Cough: No  Difficulty breathing when lying flat: No  Back pain: Yes  Muscle aches: Yes  Neck pain: Yes  Swollen joints: No  Joint pain: Yes  Bone pain: No  Muscle cramps: No  Muscle weakness: Yes  Joint stiffness: No  Bone fracture: No        Current Outpatient Medications   Medication Sig Dispense Refill     lisinopril (ZESTRIL) 10 MG tablet TAKE 1 TABLET BY MOUTH DAILY FOR BLOOD PRESSURE 90 tablet 1     metoprolol succinate ER (TOPROL XL) 25 MG 24 hr tablet Take 1 tablet (25 mg) by mouth daily 90 tablet 3     mirtazapine (REMERON) 15 MG tablet TAKE ONE TABLET AT BEDTIME. 30 tablet 2     riluzole (RILUTEK) 50 MG tablet TAKE 1 TABLET BY MOUTH EVERY 12 HOURS 180 tablet 1     rivaroxaban ANTICOAGULANT (XARELTO ANTICOAGULANT) 20 MG TABS tablet Take 1 tablet (20 mg) by mouth daily (with dinner) 90 tablet 3     traZODone (DESYREL) 50 MG tablet TAKE 1 TABLET BY MOUTH EVERYDAY AT BEDTIME 90 tablet 1       Social Hx:  Here with his son. Lifetime nonsmoker. Father  from lung cancer at age 77    Family Hx:   Family History   Problem Relation Age of Onset     Diabetes Mother         elderly Type 2 borderline     C.A.D. Father         stented     Hypertension Father      Melanoma Son      Heart Disease Son         ablation for reentrant tachycardia     Esophageal  Cancer Brother 66     Breast Cancer Sister 40     Cancer - colorectal No family hx of      Anesthesia Reaction No family hx of      Blood Disease No family hx of      Thrombophilia No family hx of        O: Alert, appears comfortable in general - mild resp acc muscle use when speaking in full sentences.  VS: /74   Pulse 82   Resp 16   Wt 69.4 kg (153 lb)   SpO2 96%   BMI 21.35 kg/m    HEENT: NC/AT, no icterus  Neck: No HARIS  Lungs: CTA bilaterally, no wheezes or crackles  Cor: RRR S1S2, no murmurs  Extr: No clubbing/cyanosis/edema  Neuro: Alert, speech fluent, no facial droop    Spirometry was performed 10/13/22 and is personally reviewed: FEV1 was 2.19 L or 67% predicted. FVC was 2.43 L or 55 % predicted. MIP was reduced at -39 cwp.      A/P:     1) ALS with restrictive lung disease and daytime dyspnea  - Not hypoxic - discussed that supplemental O2 is unlikely to be of benefit and may cause issues with CO2 retention  - No wheezing, cough/congestion to suggest that nebs would help  - Afib may be contributing to symptoms - currently seems in NSR and on low-dose metoprolol  - Discussed that ongoing NIV use is key. Will get overnight oximetry to see if he has significant desaturations - if he does, it will be more important for him to use at night (but also would be more likely to be of daytime benefit)  - I'll call him (he requests I call his wife Zuleima [phone]) when I see results of the oximetry              Again, thank you for allowing me to participate in the care of your patient.      Sincerely,    Shamika Flanagan MD

## 2023-01-01 ENCOUNTER — TELEPHONE (OUTPATIENT)
Dept: FAMILY MEDICINE | Facility: CLINIC | Age: 78
End: 2023-01-01

## 2023-01-01 ENCOUNTER — MYC MEDICAL ADVICE (OUTPATIENT)
Dept: CARDIOLOGY | Facility: CLINIC | Age: 78
End: 2023-01-01

## 2023-01-01 ENCOUNTER — THERAPY VISIT (OUTPATIENT)
Dept: OCCUPATIONAL THERAPY | Facility: CLINIC | Age: 78
End: 2023-01-01
Payer: COMMERCIAL

## 2023-01-01 ENCOUNTER — THERAPY VISIT (OUTPATIENT)
Dept: SPEECH THERAPY | Facility: CLINIC | Age: 78
End: 2023-01-01
Payer: COMMERCIAL

## 2023-01-01 ENCOUNTER — PATIENT OUTREACH (OUTPATIENT)
Dept: CARE COORDINATION | Facility: CLINIC | Age: 78
End: 2023-01-01

## 2023-01-01 ENCOUNTER — ALLIED HEALTH/NURSE VISIT (OUTPATIENT)
Dept: NEUROLOGY | Facility: CLINIC | Age: 78
End: 2023-01-01

## 2023-01-01 ENCOUNTER — OFFICE VISIT (OUTPATIENT)
Dept: NEUROLOGY | Facility: CLINIC | Age: 78
End: 2023-01-01
Payer: COMMERCIAL

## 2023-01-01 ENCOUNTER — MEDICAL CORRESPONDENCE (OUTPATIENT)
Dept: HEALTH INFORMATION MANAGEMENT | Facility: CLINIC | Age: 78
End: 2023-01-01
Payer: COMMERCIAL

## 2023-01-01 ENCOUNTER — PATIENT OUTREACH (OUTPATIENT)
Dept: CARE COORDINATION | Facility: CLINIC | Age: 78
End: 2023-01-01
Payer: COMMERCIAL

## 2023-01-01 ENCOUNTER — HEALTH MAINTENANCE LETTER (OUTPATIENT)
Age: 78
End: 2023-01-01

## 2023-01-01 ENCOUNTER — TELEPHONE (OUTPATIENT)
Dept: FAMILY MEDICINE | Facility: CLINIC | Age: 78
End: 2023-01-01
Payer: COMMERCIAL

## 2023-01-01 ENCOUNTER — MEDICAL CORRESPONDENCE (OUTPATIENT)
Dept: HEALTH INFORMATION MANAGEMENT | Facility: CLINIC | Age: 78
End: 2023-01-01

## 2023-01-01 ENCOUNTER — TELEPHONE (OUTPATIENT)
Dept: NEUROLOGY | Facility: CLINIC | Age: 78
End: 2023-01-01
Payer: COMMERCIAL

## 2023-01-01 VITALS
BODY MASS INDEX: 20.09 KG/M2 | RESPIRATION RATE: 16 BRPM | OXYGEN SATURATION: 97 % | WEIGHT: 144 LBS | SYSTOLIC BLOOD PRESSURE: 125 MMHG | HEART RATE: 91 BPM | DIASTOLIC BLOOD PRESSURE: 85 MMHG

## 2023-01-01 DIAGNOSIS — Z74.09 IMPAIRED MOBILITY AND ADLS: ICD-10-CM

## 2023-01-01 DIAGNOSIS — R63.0 ANOREXIA: Primary | ICD-10-CM

## 2023-01-01 DIAGNOSIS — Z78.9 IMPAIRED INSTRUMENTAL ACTIVITIES OF DAILY LIVING (IADL): ICD-10-CM

## 2023-01-01 DIAGNOSIS — R47.1 DYSARTHRIA: ICD-10-CM

## 2023-01-01 DIAGNOSIS — G12.21 ALS (AMYOTROPHIC LATERAL SCLEROSIS) (H): ICD-10-CM

## 2023-01-01 DIAGNOSIS — R13.12 OROPHARYNGEAL DYSPHAGIA: ICD-10-CM

## 2023-01-01 DIAGNOSIS — R06.09 OTHER FORM OF DYSPNEA: Primary | ICD-10-CM

## 2023-01-01 DIAGNOSIS — G12.21 ALS (AMYOTROPHIC LATERAL SCLEROSIS) (H): Primary | ICD-10-CM

## 2023-01-01 DIAGNOSIS — G12.21 BRACHIAL AMYOTROPHIC DIPLEGIA (H): Primary | ICD-10-CM

## 2023-01-01 DIAGNOSIS — R06.02 SHORTNESS OF BREATH: Primary | ICD-10-CM

## 2023-01-01 DIAGNOSIS — Z78.9 IMPAIRED MOBILITY AND ADLS: ICD-10-CM

## 2023-01-01 LAB
EXPTIME-PRE: 4.78 SEC
FEF2575-%PRED-PRE: 114 %
FEF2575-PRE: 2.55 L/SEC
FEF2575-PRED: 2.21 L/SEC
FEFMAX-%PRED-PRE: 45 %
FEFMAX-PRE: 3.79 L/SEC
FEFMAX-PRED: 8.29 L/SEC
FEV1-%PRED-PRE: 46 %
FEV1-PRE: 1.39 L
FEV1FEV6-PRE: 96 %
FEV1FEV6-PRED: 77 %
FEV1FVC-PRE: 96 %
FEV1FVC-PRED: 76 %
FIFMAX-PRE: 3.97 L/SEC
FVC-%PRED-PRE: 36 %
FVC-PRE: 1.45 L
FVC-PRED: 4.01 L
MEP-PRE: 14 CMH2O
MIP-PRE: -19 CMH2O

## 2023-01-01 PROCEDURE — 92522 EVALUATE SPEECH PRODUCTION: CPT | Mod: GN | Performed by: SPEECH-LANGUAGE PATHOLOGIST

## 2023-01-01 PROCEDURE — 97166 OT EVAL MOD COMPLEX 45 MIN: CPT | Mod: GO | Performed by: OCCUPATIONAL THERAPIST

## 2023-01-01 PROCEDURE — 94375 RESPIRATORY FLOW VOLUME LOOP: CPT | Performed by: INTERNAL MEDICINE

## 2023-01-01 PROCEDURE — 92610 EVALUATE SWALLOWING FUNCTION: CPT | Mod: GN | Performed by: SPEECH-LANGUAGE PATHOLOGIST

## 2023-01-01 PROCEDURE — 92526 ORAL FUNCTION THERAPY: CPT | Mod: GN | Performed by: SPEECH-LANGUAGE PATHOLOGIST

## 2023-01-01 PROCEDURE — 99215 OFFICE O/P EST HI 40 MIN: CPT | Mod: GC | Performed by: PSYCHIATRY & NEUROLOGY

## 2023-01-01 PROCEDURE — 97535 SELF CARE MNGMENT TRAINING: CPT | Mod: GO | Performed by: OCCUPATIONAL THERAPIST

## 2023-01-01 RX ORDER — MEGESTROL ACETATE 40 MG/ML
400 SUSPENSION ORAL DAILY
Qty: 300 ML | Refills: 2 | Status: SHIPPED | OUTPATIENT
Start: 2023-01-01

## 2023-01-01 RX ORDER — RILUZOLE 50 MG/1
TABLET, FILM COATED ORAL
Qty: 180 TABLET | Refills: 1 | OUTPATIENT
Start: 2023-01-01

## 2023-01-01 RX ORDER — DRONABINOL 5 MG/1
5 CAPSULE ORAL
Qty: 60 CAPSULE | Refills: 0 | Status: SHIPPED | OUTPATIENT
Start: 2023-01-01 | End: 2023-01-01

## 2023-01-01 ASSESSMENT — REVISED AMYOTROPHIC LATERAL SCLEROSIS FUNCTIONAL RATING SCALE (ALSFRS-R)
CLIMBING_STAIRS: 1
SPEECH: 3
WALKING: 3
CUTTING_FOOD_AND_HANDLING_UTENSILES: 0
CLIMBING_STAIRS: NEEDS ASSISTANCE
TURNING_IN_BED_AND_ADJUSTING_BED_CLOTHES: 2
ALSFRS_TOTAL_SCORE: 28
WALKING: EARLY AMBULATION DIFFICULTIES
SALIVATION: 4
ORTHOPENA: 4
FINE_MOTOR_SUBTOTAL_SCORE: 4
RESPIRATORY_SUBTOTAL_SCORE: 8
DYSPNEA: 2
RESPIRATORY_INSUFFICIENCY: 2
SALIVATION: NORMAL
HANDWRITING: 3
SWALLOWING: 3
DRESSING_AND_HYGEINE: NEEDS ATTENDANT FOR SELF-CARE
RESPIRATORY_INSUFFICIENCY: CONTINUOUS USE OF NIPPV DURING THE NIGHT
GROSS_MOTOR_SUBTOTAL_SCORE: 6
HANDWRITING: SLOW OR SLOPPY: ALL WORDS ARE LEGIBLE
BULBAR_SUBTOTAL: 10
SPEECH: DETECTABLE SPEECH DISTURBANCES
DRESSING_AND_HYGEINE: 1
SWALLOWING: EARLY EATING PROBLEMS - OCCASIONAL CHOKING
TURNING_IN_BED_AND_ADJUSTING_BED_CLOTHES: CAN TURN ALONE OR ADJUST SHEETS, BUT WITH GREAT DIFFICULTY
DYSPNEA: OCCURS WITH ONE OR MORE OF THE FOLLOWING: EATING, BATHING, DRESSING (ADL)
SIX_ITEM_SUBTOTAL: 14

## 2023-01-01 ASSESSMENT — PAIN SCALES - GENERAL: PAINLEVEL: NO PAIN (0)

## 2023-01-04 NOTE — TELEPHONE ENCOUNTER
Patient's wife calling and states patient's breathing difficulty has increased. He is out of breath walking from one chair to another (noted to have great difficulty with LUNDBERG in 12/29 mychart message with pulmonology.) Pt with chronic respiratory failure with hypoxia and hypercapnia and ALS.     Pts wife states pt started coughing recently, a shallow cough with loose phlegm.  Wife states phlegm is clear and usually happens after pt swallows. Wife states pt is uncomfortable from cough.    Writer advised pt's wife reach out to patient's pulmonologist for recommendations related to breathing issues/cough due to patient's current symptoms and health history.    Wife verbalized agreement to plan.  Iraida Cole RN    [facility]- Primary Care

## 2023-01-05 NOTE — PROGRESS NOTES
Social Work -ALS Clinic Progress Note  [facility]    Data/Intervention:    Patient Name:  Steve Herring  /Age:  1945 (77 year old)    Visit Type: telephone    Collaborated With:    -son Jacob    Psychosocial info/Concerns:   Pt's son called wanting to discuss resources for care of his Father. He reports a decline in his breathing and he indicated that his Mother is calling the PCP to address. He reported that she did put him on is bipap and that has seemed to help.  Jacob indicated that his Dad is loosing his will to continue. He continues to lose weight. He indicated that they deferred a decision on a Gtube at his last visit. He doesn't think his Dad will want it. He indicated he sees Dr Palumbo in a few weeks.     Intervention/Education/Resources Provided:  Listened and supported.  Discussed that Dr Palumbo' note indicates to go to primary care or urgent care to address change in breathing.  Discussed that if he doesn't want a Gtube, he may be an appropriate hospice candidate but would need to address that with Dr Palumbo. Discussed choosing an agency and I will email him information. They would plan to care for him at home.     Assessment/Plan:  Depending on further medical assessment, a conversation with Dr Palumbo may be needed to address future planning before next appt so we can get appropriate services in place to support them. Will communicate with Dr Palumbo and Zeenat Phoenix LPN.     Provided patient/family with contact information and encouraged them to contact me between clinic visits as needed.     Esmer Sandoval, BOBY, [facility]    [facility]  [phone]

## 2023-01-11 NOTE — TELEPHONE ENCOUNTER
chela message read by patient.    Ok to close this encounter.    Mary Anne Valencia, RN  Cardiology Care Coordinator  [facility]  [phone] option 1

## 2023-01-12 NOTE — PROGRESS NOTES
[facility]      Outpatient Speech Language Pathology Neurology Clinic Evaluation  Steve Herring YOB: 1945 [id number]    Visit Date: 1/12/2023    Age: 77 year old    Medical Diagnosis: Amyotrophic lateral sclerosis (ALS)    Date of Diagnosis: 12/16/21    Referring MD: Dr Palumbo     present: No    PMH: Refer to Medical Chart    Fall Risk:Refer to physician report.    Others at Clinic Visit: Son Manuel    Living Situation: with wife Zuleima    Patient Concerns/Goals: Increased swallowing difficulty, Increased speech deficits    Observations: Elder is seen for the first time by this SLP, he denies previous SLP assessment however MAYELIN (Manuel's wife) is SLP. He is seen at his request for swallowing strategies recommendations as he would like to avoid feeding tube placement.    Current Mode of Nutrition: Oral diet of regular solids and thin liquids, denies avoiding any foods due to dysphagia    Weight: 144 lbs (181lbs at dx)    Cardio-Respiratory Status: Forced vital capacity: 36%    Functional Rating Scale (ALS-FRS): 28/48      Oral Motor/Swallowing  Oral Motor Function:  Generally intact except mild labial weakness on exam however deficits suggested by dysphagia eval findings    Volitional Abilities:  Cough- Non-glottic  Throat Clear- Not functional  Swallow- Present    Feeding Assistance: Dependent   Oral Cares: WNL reported, educated as below    Swallow Function:   Thin Liquid- mildly reduced bolus prep/control and A-P propulsion, suspected premature spillage, reduced pharyngeal constriction, multiple swallows to pass bolus, reduced laryngeal elevation, but no overt s/s aspiration.    Mildly Thick Liquid- mildly reduced bolus prep/control and A-P propulsion, reduced pharyngeal constriction, reduced laryngeal elevation, but no overt s/s aspiration.    Regular Solid- Increased mastication  time, reduced bolus prep/control and A-P propulsion, reduced pharyngeal constriction, c/o bolus caught in throat, multiple swallows to pass bolus, reduced laryngeal elevation, and OVERT s/s aspiration including prolonged cough attempt (cough is non-functional), runny nose, and watery eyes.    Dysphagia Diagnosis: Mild-moderate dysphagia     Dysphagia Intervention: SLP educated in swallowing anatomy and physiology including aspiration and possible respiratory compromise from aspiration. Trained safe swallow strategies to reduce aspiration risks (small bites/sips, slow rate of intake, chin tuck/neutral head position, pills in purees, mindful swallowing, use caution with straws). Also trained diet modifications to reduce risk of aspiration and ease intake given the OVERT s/s aspiration with solids observed today which he relates to the cookie being so hard and dry, recommend choosing soft moist solids. He also describes situation where waffle caused a similar situation. SLP educated in thickened liquids including rationale and possible benefit in reducing aspiration. Provided training in thickening options including powders vs gels and provided samples and purchasing info. Although thickened liquids do not appear necessary at this time they may be indicated prior to next clinic visit. Educated in role of oral cares in respiratory health with increased aspiration risks, trained and encouraged good oral hygiene.       Speech Intelligibility/Functional Communication   Methods of communication: Verbal    Dysarthria: Mild    Speech:   Deficits in phonation- Breathy quality and Loudness (soft)  Deficits in articulation- Decreased precision of articulation  Intelligibility- 100% to this SLP, son agrees    Speech Intelligibility/Communication:  Communicates functionally    Augmentative and Alternative Communication (AAC):   Educated in option for voice amplifier while on NIV, will as ALSA to follow up      Clinical  Impression/Plan of Care  Treatment Diagnosis: Oropharyngeal Dysphagia     Recommendations:  Oral diet of soft moist solids.   Okay for thin liquids but consider use of thickener if s/s noted with liquid intake.  Use of compensatory swallow strategies listed above.  Work with ALSA regarding speech amplifier while on NIV    Plan of Care:  1 session evaluation and treatment.    Goals: Based on today's evaluation session patient and/or caregiver will have understanding of current communication and/or swallowing status and recommendations for management.    Educational Assessment:  Learners- Patient and Children  Barriers to Learning- No barriers.    Education provided/response:   Speech  Swallowing  AAC  Diet  Verbalized understanding    Treatment provided this date:   Swallow intervention, 32 minutes  Communication intervention, 0 minutes    Response to recommendations/treatment: verbalized understanding, asked appropriate questions, agreed to recommendations    Goal attainment: All goals met    Risks and benefits of evaluation/treatment have been explained.  Patient, family and/or caregiver are in agreement with Plan of Care.    Timed Code Treatment Minutes: 0  Total Treatment Time (sum of timed and untimed services): 62

## 2023-01-12 NOTE — PROGRESS NOTES
[facility]: [place]  Neuromuscular Progress Note    Patient Name:  Steve Herring  MRN:  [id number]    :  1945  Date of Service:  2023  Primary care provider:  Mango Gilliland      Patient Summary  I had the pleasure to see Steve Herring in followup at the [facility] today.  He was escorted by his son.  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  However, more recently, he is developing more signs of typical ALS because he has developed neck extensor weakness, head drop and a decline in his respiratory function.  An EMG I did in 2022 showed denervation in the thoracic and lumbar region and trapezius.  Symptoms began around late  or 2021.      Interval History:   Eating and breathing has become more difficult.  Patient had one episode where he had choked on food and had difficulty coughing out the food. He continues to eat 2-3 meals per day but intake is minimal. Patient denies significant dyspnea with large meals (although it's doubtful that patient has had any large meals recently).    Even after being on BiPAP after 12 hours, getting to the bathroom is challenging - he becomes breathless. Bipap has helped with sleep. Energy is ok at rest but diminishes rapidly upon exertion. Interestingly, patient feels that his breathing worsens when he sits up or stand up. He's most comfortable off BIPAP while laying down. He can stand up independently and take a few steps - however, his dyspnea on exertion is what limits his distance. Son states that patient does occasionally need extra breaths to finish a long sentence. BiPAP has been helpful with improving the patient's sleep.    Patient confirms that feeding tube is not in line with his goals of cares - he does not want it. He is ok with a comfort approach to care.     Patient's left shoulder has been quite painful  during transfers. They are wondering about other options for transferring.    Patient's wife is the primary care giver, and son thinks she could use some extra help.      His weight has declined from the last visit.  It is 144 lb. and it was previously 160.  However, he has lost about 40 pounds since the onset of this disease.  His appetite is relatively poor, but he tries to eat all day to keep his calories up.  He also has an occasional insomnia.      Patient and son are wondering about the true diagnosis of flail arm syndrome - given progression of respiratory weakness. v.s. ALS.  They are also wondering about the ability to obtain supplemental O2 for dyspnea on exertion.    Sleep: Good   Sialorrhea: None   Weight: Decreased   Pain/Cramps: none  Depression: Stable   Anxiety: Stable   Pseudobulbar: None    Cognitive: None   Life Planning: Interested in enrolling in hospice   Caregiver: Wife     Constant rhinorrhea, affrin was not helpful                                                                  ROS: A 10-point ROS was performed as per HPI.    PMH:  Past Medical History:   Diagnosis Date     Arthritis 01/01/2010     Colon polyps 01/01/2003    colonoscopy due Jan 2012     Dupuytren's contracture ~2000    RT>LT     Erectile dysfunction      Essential hypertension, benign 01/01/2003     Hyperlipidemia LDL goal <130      MMT (medial meniscus tear) 01/01/2004    RT     Paroxysmal atrial fibrillation (H)      Past Surgical History:   Procedure Laterality Date     CATARACT IOL, RT/LT  12/18/08    RT     CATARACT IOL, RT/LT  11/13/13    LT, Dr. Carvajal     COLONOSCOPY       HC PNEUMATIC RETINOPEXY  3/17/05    Dr. Steve Hebert     HC REMOVE TONSILS/ADENOIDS,14+ Y/O         Medications:      Current Outpatient Medications:      lisinopril (ZESTRIL) 10 MG tablet, TAKE 1 TABLET BY MOUTH DAILY FOR BLOOD PRESSURE, Disp: 90 tablet, Rfl: 1     metoprolol succinate ER (TOPROL XL) 25 MG 24 hr tablet, Take 1 tablet (25 mg) by  mouth daily, Disp: 90 tablet, Rfl: 3     mirtazapine (REMERON) 15 MG tablet, TAKE ONE TABLET AT BEDTIME., Disp: 30 tablet, Rfl: 2     riluzole (RILUTEK) 50 MG tablet, TAKE 1 TABLET BY MOUTH EVERY 12 HOURS, Disp: 180 tablet, Rfl: 1     rivaroxaban ANTICOAGULANT (XARELTO ANTICOAGULANT) 20 MG TABS tablet, Take 1 tablet (20 mg) by mouth daily (with dinner), Disp: 90 tablet, Rfl: 3     traZODone (DESYREL) 50 MG tablet, TAKE 1 TABLET BY MOUTH EVERYDAY AT BEDTIME, Disp: 90 tablet, Rfl: 1  No current facility-administered medications for this visit.    Facility-Administered Medications Ordered in Other Visits:      sodium chloride (PF) 0.9% PF flush 10 mL, 10 mL, Intracatheter, Once, Martin Jorgensen MD    Physical Examination:   There were no vitals taken for this visit.  Wt Readings from Last 4 Encounters:   12/15/22 69.4 kg (153 lb)   12/02/22 69.5 kg (153 lb 3.2 oz)   10/13/22 73.3 kg (161 lb 8 oz)   06/23/22 74.8 kg (165 lb)     General: pt sitting comfortably in chair, breathing comfortable at rest on room air but does speak in short sentences   -MS: alert, speech fluent and coherent    Neurological Exam:     NM Exam: Cranial   No tongue fasciculations      Tongue movements: full bilaterally  Tongue weakness: 4/5 bilaterally (mild weakness)   Jaw jerk: absent   Speech: no dysarthria or spastic voice  Pseudobulbar affect: no     NM Exam: Upper Extremities   Significant atrophy throughout all major muscle groups in the upper extremities. Without increased tone except bilateral fingers in flexed position.       Right:  Left:    Atrophy:   Yes Yes    Fasciculations:  Yes  Yes                  Reflexes Right:  Left:    Biceps:  1+ 1+   Brachioradialis:  1+ 1+   Triceps:  1+ 1+            Strength Right:  Left:    Shoulder abduction*:  1 1   Elbow flexion:  2 2   Elbow extension*:  2 2   Wrist flexion 4 4   Finger extension:  2 1   Finger flexion:  4 4   Abductor pollicis brevis:  0 0        First dorsal  interosseous*:  1 0   * Indicates a recommended field   Comment:      NM Exam: Axial   Neck flexion weakness: 4 to 4-/5  Neck extension weakness: 5/5     NM Exam: Lower Extremities               Right:  Left:              Muscle tone:  Normal Normal            Reflexes Right:  Left:    Patellar:  3+ 3+   Achilles:  2+ 2+   Mute toes bilaterally         Strength Right:  Left:    Hip flexion*:  5 5   Hip extension:  5 5   Hip adduction:  5 5   Hip abduction:  5 5   Knee extension*:  5 5   Knee flexion:  5 5   Ankle dorsiflexion*:  5 5   Ankle plantar flexion:  5 5       -Sensory: intact to light touch in all extremities    -Gait: not fully tested - able to stand up independently from sitting position and able to take a couple tests without dyspnea     Investigations:    Last PFTs 10/13/22: FVC is 55%, FEV1 67% and MIP -39 cm water.  Three months ago, FVC was 74% and MIP -61.      Patient will obtain repeat PFT later today 1/12/22 - pending    Impression:  Mr. Herring initially presented with symptoms that appeared possible for flail arm syndrome; however, given significant development of rapidly progressing respiratory symptoms (Last PFT with FEV 55%) with associated weakness with neck flexion, we discussed that he has now since declared himself with symptoms more consistent with ALS, and thus the patient warrants treatment for ALS (we clarified the diagnosis of ALS with patient and his son this clinic visit). Patient has effectively no meaningful functional control in his arms but retains full strength in his legs. Nevertheless, the patient's appetite remains poor with dysphagia, reflected by patient's significant weight loss of 15 lbs in the past 3 months. He confirms that he does not want a feeding tube and prefers a comfort approach to care. We discussed that supplemental oxygen is contraindicated in his case due to neuromuscular related hypercarbic respiratory failure and risk of respiratory suppression with  subsequent somnolence, lethargy, and even death with use of supplemental oxygen. Discussed, instead, the need to use his Trilogy device throughout the day - the logistical settings required of the device should be discussed with his pulmonology team.      Again discussed criteria for hospice and it appears that patient and family is interested but has not yet come to a final decision.  Discussed that if the patient's repeat PFTs today show FEV1 less than 50%, patient should have no difficulty enrolling in hospice.     He will be seen by our occupational and physical therapists today.  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 He may need some splinting there.  The head drop can also be discussed with OT regarding orthotics. Family is also interested in learning about what other transfer options are available to minimize the patient's occasional left shoulder pain.    Given significant weight loss and persistent dysphagia despite mirtazepine, patient will also be seen by speech and the dietician today.  We discussed a trial of dronabinol, to improve appetite, which the patient is interested in.  While he is on dronabinol, he should stop mirtazapine.  If medical marijuana is unhelpful, will consider restarting mirtazapine at that time.    PFT Latest Ref Rng & Units 1/12/2023   FVC L 1.45   FEV1 L 1.39   FVC% % 36   FEV1% % 46     Recommendations:     -Stop riluzole (reason: comfort care, hospice orientation)  -Speech, Dietician, PT/OT, PFT's today  -Start dronabinol 5 mg bid for appetite stimulation  -Stop mirtazepine   -Continue discussions surrounding possible imminent hospice enrollment pending PFT's this afternoon  - PCP to address rhinorrhea-nasal congestion. Per patient, this problem is very longstanding and unlikely to be related to ALS  -RTC 3 mo    Patient seen and discussed with attending neurologist, Dr. Palumbo, who agrees with above.    Lizette Bagley MD  PGY-2    ATTENDING ADDENDUM: Patient seen and  examined with resident Dr Bagley today at the [facility]. Agree with her impression and recommendations. Mr Herring's ALS is advancing and now causing major ventilatory dysfunction (see PFT results above- FVC 20% lower compared to 3 months ago). He has decided about his goals of care and wishes to pursue comfort care. POLST in chart. He declined gastrostomy. Will offer him RD consult and dronabinol to address poor appetite and weight loss of 20 lbs.     Also would benefit from using a transfer belt as he is unable to stand unassisted due to complete arm paralysis. Nasal congestion should be addressed by PCP. I don't think this is ALS related. Will stop riluzole He can use BiPAP liberally during the day for dyspnea.     Follow-up 3 months    Billing MDM 5 (high) based on 1) Problems assessed: One chronic disorder with severe progression, exacerbation, causing risk for life or bodily function (ALS, with ventilatory failure) and 2) Risk: decision to de-escalate care due to poor prognosis. Shawn Palumbo MD

## 2023-01-12 NOTE — PROGRESS NOTES
"[facility]    OUTPATIENT OCCUPATIONAL THERAPY CLINIC NOTE  Steve Herring  YOB: 1945  [id number]    Type of visit:  Evaluation            Date of service: 1/12/2023    Referring provider: Dr. 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 Onset occurred in R UE and then L. Dx with BAD form of ALS 12/16/21.    Cardio-respiratory status:  Forced vital capacity: 55 % of predicted MIP -39; 10/2022 visit    Height/Weight: 6'1\" / 144#    Living environment:  House-split with two chair glides  2 story, railings    Living environment barriers:  1-2 stairs to enter (0 railing present)  Full stairs within home (1 railing present)    Bedroom upstairs     Tub/shower upstairs low toilet   Shower downstairs higher toilet down    Current assistance/living environment:  Lives with spouse, Zuleima    Has grown malick, Manuel and Damian    Current mobility equipment:  Manual w/c with cushion  Gait belt  Bed wedge  Power wheelchair for outdoors-used [place]    Current ADL equipment:  Foam neck collar  2 bidets  ? Pre-fabricated L resting hand splint     Technology used: laptop-not really able to use due to hand weakness, phone, has Mary control for some environmental control of items in home; smartphone for games    Patient " concerns/goals: notes his 4th and 5th fingers are curling in    Evaluation   Interview completed.   Pain assessment:  Pain present  Location: shoulders and knees/Rating: none at rest but present with certain movements due to DJD    Range of motion:  UE AROM:impaired with mainly active finger flexion and impaired ext at ulnar digits/PROM is intact at elbows and L wrist but R wrist ext is impaired with fingers extended due to Dupuytren's contracture. L composite wrist ext with finger ext limited to 20 degrees due to tightness at 5th digit greatest, Active wrist ext on L at least 3 and R impaired with wrist drop with   Manual muscle testing: impaired-see above, grasp is weak Angelito but fairly functional  Cognition:  Appears intact with pt providing his own history  today     ADL:   Feeding self:  Wife has had to help in last 10 days due to progression of weakness   Grooming: wife assist  UE Dressing:  Wife assists  LE Dressing:  Wife assists  Showering/bathing: wife assists, pt can do some of lower body  Toileting/transfer:  Assist with clothing; pt reports Ind toilet transfer with high toilets and bidet for hygiene  Functional Mobility: stooped posture and mild head drop, Ind, no assistive device, does wear soft neck collar out of house, not in home much; at times need assist sit to stand with lower surfaces/fatigue; needs mod assist sit to/from supine with torso today; cannot maldonado lying lfat, needs head of plinth rasied to approx 60 degrees for comfort; reports dyspnea limits him at home to walking from one room to another only at a time. Uses manaul w/c today to get to clinic    Functional communication: WFL verbalizing     IADL:   Home management:   spouse  Driving:  Spouse, sons  Occupation: retired  Leisure: reading, play video games, [employer];TV, golf-no longer  Emergency Call system:smartphone-"[employer] voice activated access      Fall Risk Screen:   Has the patient fallen 2 or more times in the last year?  Yes      Has the patient fallen and had an injury in the past year? Yes    Syncope x 2-fell down stairs, no injury and another time in kitchen with injury-in  past year   Timed Up and Go Score: NT today    Is the patient a fall risk? No, does amb with stooped posture, forward head due to arm weakness Angleito, slowed pace     Impairments:  Fatigue  Muscle atrophy  Coordination  Balance  Pain  Range of motion  Respiratory compromise     Treatment diagnosis:  Impaired activities of daily living and mobility  Impaired IADL    Assessment of Occupational Performance: 3-5 Performance Deficits  Identified Performance Deficits (ie: feeding, social skills): feeding self, hygiene, dressing, showering, toileting, home management, leisure  Clinical Decision Making (Complexity): Moderate complexity     Recommendations/Plan of care:  Patient would benefit from interventions to enhance safety and independence.  Rehab potential good for stated goals.  Occupational therapy intervention for  self care/home management   Frequency: one time eval and Rx      Goals:   Target date: today  1. Patient, family and/or caregiver will verbalize/demonstrate understanding of at least 2 compensatory methods /equipment to enhance functional independence and safety.  2. Patient, family and/or caregiver will verbalize/demonstrate understanding of proper method to assist with supine to/from sit and sit to/from stand transfers  3. Patient, family and/or caregiver will verbalize/demonstrate understanding of positioning techniques/equipment for improved body mechanics and energy management with ADL.      Educational assessment/barriers to learning:  Hearing    reatment provided this date:   Self care/home management, 15  minutes of training in:    Proper method to don gait belt and sit to/from stand Transfers with gait belt and how to assist pt as care provider per son's request today  Supine to/from sit transfer assist and training with pt using plinth in  clinic room today and resting there in supine for portion of clinic visit due to postural fatigue  With progression of difficulty with bed mobility methods to plan for:manual lift and training; hospital bed-pt declines at this time; bed wedge-pt has and will start to use and agrees will aid wife assisting him with bed mobility    Bestic Feeder, and Obi Feeder as alternative for future wait list if that does not work and he decides. -requested Bestic Feeder from [facility] for pt today and son, wife will need to help pt to set this up for him    Purpose of Power wheelchair for distance (store loaner from [facility] in garage), prefers manual and family pushin him at this time    Assistive technology options including iPad, [employer] wearable hands free technology and mounting systems to aid use to aid Ind access to computer for leisure pursuits/QOL/functional communication as hand function declining -wants to be able to continue computer games, Sudoku, reading, audio books. Requested that Pat Elizabeth from [facility] f/u with son, Manuel (per pt preference) about options available from loan program and whether he can provide support or if in home assessement via [employer], [employer] is required.         Response to treatment/recommendations: receptive    Goal attainment:  All goals met    Risks and benefits of evaluation/treatment have been explained.  Patient, family and/or caregiver are in agreement with Plan of Care.     Timed Code Treatment Minutes: 14  Total Treatment Time (sum of timed and untimed services): 24 min    Signature: Nilay Garcia OTR/L   Date: 1/12/2023     [employer] Medicare Advantage

## 2023-01-12 NOTE — PROGRESS NOTES
CLINICAL NUTRITION SERVICES - REASSESSMENT NOTE     EVALUATION OF PREVIOUS PLAN OF CARE:   Referring Physician: Dr. Palumbo  Current diet: Regular diet  Current appetite/intake: Poor appetite, reduced intake  PEG Tube: N/A    Monitoring from previous assessment:   -Food intake - Per wife: Br - pancake or waffle, fruit; L - smoothie of Premiere Protein and fruit; D - soup  -Liquid meal replacement or supplement - One Premiere Protein per day  -Weight trends - 21# wt loss in the past 3 months  Wt Readings from Last 10 Encounters:   01/12/23 65.3 kg (144 lb)   12/15/22 69.4 kg (153 lb)   12/02/22 69.5 kg (153 lb 3.2 oz)   10/13/22 73.3 kg (161 lb 8 oz)   06/23/22 74.8 kg (165 lb)   04/04/22 77.8 kg (171 lb 9.6 oz)   03/10/22 80.7 kg (178 lb)   12/16/21 82.1 kg (181 lb 1.6 oz)   11/18/21 82.3 kg (181 lb 6.4 oz)   08/31/21 82.7 kg (182 lb 6.4 oz)     Previous Goals:   1.  Aim for 5-6 small frequent meals  2.  Aim for 2250 kcal and 75 g protein/day  3. Weight maintenance   Evaluation: Not met     Previous Nutrition Diagnosis:   Inadequate oral intake related to decreased appetite and increased needs associated with progression of ALS as evidenced by pt report and 16# (9%) wt loss over past 6 months.  Evaluation: Declining     NEW FINDINGS:   Pt confirms no improvement in reduced appetite. Per Dr. Palumbo, pt will do a 2 week trial of Marinol.      CURRENT NUTRITION DIAGNOSIS   Inadequate oral intake related to decreased appetite and increased needs associated with progression of ALS as evidenced by pt report and 21# (13%) wt loss over past 3 months.    INTERVENTIONS   Recommendations / Nutrition Prescription   1. Continue soft foods diet (per SLP)  2. Include 1-2 oral nutritional supplements per day (consider trying Fairlife milk protein shakes)  3. Small frequent meals - attempt to eat something every 2-3 hours  4. Followed up with phone call to wife, Zuleima, to discuss ways to further enrich the foods pt is  eating     Implementation  Composition of Meals and Snacks, General/healthful diet and Medical Food Supplement     Goals   1.  Aim for 5-6 small frequent meals  2.  Aim for 2000 kcal and 75 g protein/day  3. Weight maintenance     Follow up/Monitoring:   Progress towards goals will be monitored and evaluated per protocol and Practice Guidelines    America Pendleton RDN, LD

## 2023-01-12 NOTE — PATIENT INSTRUCTIONS
PT, OT speech and dietician will see you today  If your priority is comfort care, you can stop the riluzole    Please talk to Dr Flanagan or the Respiratory mask representative about the proper BiPAP settings    We can try Marinol (a cannabis derivative) for the poor appetite. I would probably stop the Remeron while taking the Marinol. Try it for 1-2 weeks please. If this doesn't work well we can go back to the Remeron.    Follow up 3 months

## 2023-01-12 NOTE — LETTER
2023       RE: Steve Herring  [address] [address]     Dear Colleague,    Thank you for referring your patient, Steve Herring, to the [facility] [place] at [facility]. Please see a copy of my visit note below.    [facility]: [place]  Neuromuscular Progress Note    Patient Name:  Steve Herring  MRN:  [id number]    :  1945  Date of Service:  2023  Primary care provider:  Mango Gilliland      Patient Summary  I had the pleasure to see Steve Herring in followup at the [facility] today.  He was escorted by his son.  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  However, more recently, he is developing more signs of typical ALS because he has developed neck extensor weakness, head drop and a decline in his respiratory function.  An EMG I did in 2022 showed denervation in the thoracic and lumbar region and trapezius.  Symptoms began around late  or 2021.      Interval History:   Eating and breathing has become more difficult.  Patient had one episode where he had choked on food and had difficulty coughing out the food. He continues to eat 2-3 meals per day but intake is minimal. Patient denies significant dyspnea with large meals (although it's doubtful that patient has had any large meals recently).    Even after being on BiPAP after 12 hours, getting to the bathroom is challenging - he becomes breathless. Bipap has helped with sleep. Energy is ok at rest but diminishes rapidly upon exertion. Interestingly, patient feels that his breathing worsens when he sits up or stand up. He's most comfortable off BIPAP while laying down. He can stand up independently and take a few steps - however, his dyspnea on exertion is what limits his distance. Son states that  patient does occasionally need extra breaths to finish a long sentence. BiPAP has been helpful with improving the patient's sleep.    Patient confirms that feeding tube is not in line with his goals of cares - he does not want it. He is ok with a comfort approach to care.     Patient's left shoulder has been quite painful during transfers. They are wondering about other options for transferring.    Patient's wife is the primary care giver, and son thinks she could use some extra help.      His weight has declined from the last visit.  It is 144 lb. and it was previously 160.  However, he has lost about 40 pounds since the onset of this disease.  His appetite is relatively poor, but he tries to eat all day to keep his calories up.  He also has an occasional insomnia.      Patient and son are wondering about the true diagnosis of flail arm syndrome - given progression of respiratory weakness. v.s. ALS.  They are also wondering about the ability to obtain supplemental O2 for dyspnea on exertion.    Sleep: Good   Sialorrhea: None   Weight: Decreased   Pain/Cramps: none  Depression: Stable   Anxiety: Stable   Pseudobulbar: None    Cognitive: None   Life Planning: Interested in enrolling in hospice   Caregiver: Wife     Constant rhinorrhea, affrin was not helpful                                                                  ROS: A 10-point ROS was performed as per HPI.    PMH:  Past Medical History:   Diagnosis Date     Arthritis 01/01/2010     Colon polyps 01/01/2003    colonoscopy due Jan 2012     Dupuytren's contracture ~2000    RT>LT     Erectile dysfunction      Essential hypertension, benign 01/01/2003     Hyperlipidemia LDL goal <130      MMT (medial meniscus tear) 01/01/2004    RT     Paroxysmal atrial fibrillation (H)      Past Surgical History:   Procedure Laterality Date     CATARACT IOL, RT/LT  12/18/08    RT     CATARACT IOL, RT/LT  11/13/13    LT, Dr. Carvajal     COLONOSCOPY       HC PNEUMATIC RETINOPEXY   3/17/05    Dr. Steve Hebert     HC REMOVE TONSILS/ADENOIDS,12+ Y/O         Medications:      Current Outpatient Medications:      lisinopril (ZESTRIL) 10 MG tablet, TAKE 1 TABLET BY MOUTH DAILY FOR BLOOD PRESSURE, Disp: 90 tablet, Rfl: 1     metoprolol succinate ER (TOPROL XL) 25 MG 24 hr tablet, Take 1 tablet (25 mg) by mouth daily, Disp: 90 tablet, Rfl: 3     mirtazapine (REMERON) 15 MG tablet, TAKE ONE TABLET AT BEDTIME., Disp: 30 tablet, Rfl: 2     riluzole (RILUTEK) 50 MG tablet, TAKE 1 TABLET BY MOUTH EVERY 12 HOURS, Disp: 180 tablet, Rfl: 1     rivaroxaban ANTICOAGULANT (XARELTO ANTICOAGULANT) 20 MG TABS tablet, Take 1 tablet (20 mg) by mouth daily (with dinner), Disp: 90 tablet, Rfl: 3     traZODone (DESYREL) 50 MG tablet, TAKE 1 TABLET BY MOUTH EVERYDAY AT BEDTIME, Disp: 90 tablet, Rfl: 1  No current facility-administered medications for this visit.    Facility-Administered Medications Ordered in Other Visits:      sodium chloride (PF) 0.9% PF flush 10 mL, 10 mL, Intracatheter, Once, Martin Jorgensen MD    Physical Examination:   There were no vitals taken for this visit.  Wt Readings from Last 4 Encounters:   12/15/22 69.4 kg (153 lb)   12/02/22 69.5 kg (153 lb 3.2 oz)   10/13/22 73.3 kg (161 lb 8 oz)   06/23/22 74.8 kg (165 lb)     General: pt sitting comfortably in chair, breathing comfortable at rest on room air but does speak in short sentences   -MS: alert, speech fluent and coherent    Neurological Exam:     NM Exam: Cranial   No tongue fasciculations      Tongue movements: full bilaterally  Tongue weakness: 4/5 bilaterally (mild weakness)   Jaw jerk: absent   Speech: no dysarthria or spastic voice  Pseudobulbar affect: no     NM Exam: Upper Extremities   Significant atrophy throughout all major muscle groups in the upper extremities. Without increased tone except bilateral fingers in flexed position.       Right:  Left:    Atrophy:   Yes Yes    Fasciculations:  Yes  Yes                   Reflexes Right:  Left:    Biceps:  1+ 1+   Brachioradialis:  1+ 1+   Triceps:  1+ 1+            Strength Right:  Left:    Shoulder abduction*:  1 1   Elbow flexion:  2 2   Elbow extension*:  2 2   Wrist flexion 4 4   Finger extension:  2 1   Finger flexion:  4 4   Abductor pollicis brevis:  0 0        First dorsal interosseous*:  1 0   * Indicates a recommended field   Comment:      NM Exam: Axial   Neck flexion weakness: 4 to 4-/5  Neck extension weakness: 5/5     NM Exam: Lower Extremities               Right:  Left:              Muscle tone:  Normal Normal            Reflexes Right:  Left:    Patellar:  3+ 3+   Achilles:  2+ 2+   Mute toes bilaterally         Strength Right:  Left:    Hip flexion*:  5 5   Hip extension:  5 5   Hip adduction:  5 5   Hip abduction:  5 5   Knee extension*:  5 5   Knee flexion:  5 5   Ankle dorsiflexion*:  5 5   Ankle plantar flexion:  5 5       -Sensory: intact to light touch in all extremities    -Gait: not fully tested - able to stand up independently from sitting position and able to take a couple tests without dyspnea     Investigations:    Last PFTs 10/13/22: FVC is 55%, FEV1 67% and MIP -39 cm water.  Three months ago, FVC was 74% and MIP -61.      Patient will obtain repeat PFT later today 1/12/22 - pending    Impression:  Mr. Herring initially presented with symptoms that appeared possible for flail arm syndrome; however, given significant development of rapidly progressing respiratory symptoms (Last PFT with FEV 55%) with associated weakness with neck flexion, we discussed that he has now since declared himself with symptoms more consistent with ALS, and thus the patient warrants treatment for ALS (we clarified the diagnosis of ALS with patient and his son this clinic visit). Patient has effectively no meaningful functional control in his arms but retains full strength in his legs. Nevertheless, the patient's appetite remains poor with dysphagia, reflected by patient's  significant weight loss of 15 lbs in the past 3 months. He confirms that he does not want a feeding tube and prefers a comfort approach to care. We discussed that supplemental oxygen is contraindicated in his case due to neuromuscular related hypercarbic respiratory failure and risk of respiratory suppression with subsequent somnolence, lethargy, and even death with use of supplemental oxygen. Discussed, instead, the need to use his Trilogy device throughout the day - the logistical settings required of the device should be discussed with his pulmonology team.      Again discussed criteria for hospice and it appears that patient and family is interested but has not yet come to a final decision.  Discussed that if the patient's repeat PFTs today show FEV1 less than 50%, patient should have no difficulty enrolling in hospice.     He will be seen by our occupational and physical therapists today.  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 He may need some splinting there.  The head drop can also be discussed with OT regarding orthotics. Family is also interested in learning about what other transfer options are available to minimize the patient's occasional left shoulder pain.    Given significant weight loss and persistent dysphagia despite mirtazepine, patient will also be seen by speech and the dietician today.  We discussed a trial of dronabinol, to improve appetite, which the patient is interested in.  While he is on dronabinol, he should stop mirtazapine.  If medical marijuana is unhelpful, will consider restarting mirtazapine at that time.    PFT Latest Ref Rng & Units 1/12/2023   FVC L 1.45   FEV1 L 1.39   FVC% % 36   FEV1% % 46     Recommendations:     -Stop riluzole (reason: comfort care, hospice orientation)  -Speech, Dietician, PT/OT, PFT's today  -Start dronabinol 5 mg bid for appetite stimulation  -Stop mirtazepine   -Continue discussions surrounding possible imminent hospice enrollment pending PFT's this  afternoon  - PCP to address rhinorrhea-nasal congestion. Per patient, this problem is very longstanding and unlikely to be related to ALS  -RTC 3 mo    Patient seen and discussed with attending neurologist, Dr. Palumbo, who agrees with above.    Lizette Bagley MD  PGY-2    ATTENDING ADDENDUM: Patient seen and examined with resident Dr Bagley today at the [facility]. Agree with her impression and recommendations. Mr Herring's ALS is advancing and now causing major ventilatory dysfunction (see PFT results above- FVC 20% lower compared to 3 months ago). He has decided about his goals of care and wishes to pursue comfort care. POLST in chart. He declined gastrostomy. Will offer him RD consult and dronabinol to address poor appetite and weight loss of 20 lbs.     Also would benefit from using a transfer belt as he is unable to stand unassisted due to complete arm paralysis. Nasal congestion should be addressed by PCP. I don't think this is ALS related. Will stop riluzole He can use BiPAP liberally during the day for dyspnea.     Follow-up 3 months    Billing MDM 5 (high) based on 1) Problems assessed: One chronic disorder with severe progression, exacerbation, causing risk for life or bodily function (ALS, with ventilatory failure) and 2) Risk: decision to de-escalate care due to poor prognosis. Shawn Palumbo MD          Again, thank you for allowing me to participate in the care of your patient.      Sincerely,    Shawn Palumbo MD

## 2023-01-13 NOTE — TELEPHONE ENCOUNTER
Prior Authorization Retail Medication Request    Medication/Dose: Marinol  ICD code (if different than what is on RX):  R63.0  Previously Tried and Failed:    Rationale:  Loss of appetite/malnutrition/anorexia due to ALS    Insurance Name:    Insurance ID:      Pharmacy Information (if different than what is on RX)  Name:    Phone:

## 2023-01-16 NOTE — PROGRESS NOTES
Social Work -ALS Clinic Progress Note  [facility]    Data/Intervention:    Patient Name:  Steve Herring  /Age:  1945 (77 year old)    Visit Type: telephone    Collaborated With:    -Manuel, Pt's son  -Dr Goodson and Zeenat Phoenix LP    Psychosocial info/Concerns:   Manuel, Pt's son wanting my review of the notes from his last visit and what would be the next steps.    Intervention/Education/Resources Provided:  Reviewed notes and sent a note to Dr Palumbo re whether Pt would benefit now from a cough assist as part of a review of needed DME prior to entry into hospice due to restrictions on covered items. Dr Palumbo indicated cough assist is not indicated at this time.  Discussed with Pt's son Maunel and suggested that hospice enrollment could be an appropriate next step if they are ready for that. We have previously discussed hospice. They are ready and would like a referral to be sent to [facility]. They plan to do an introductory visit with them and will then determine if they wish to do another visit with another agency as comparison or not.    Assessment/Plan:  Request made to Dr Palumbo and Zeenat Phoenix LPN for hospice referral to [facility].     Provided patient/family with contact information and encouraged them to contact me between clinic visits as needed.     sEmer Sandoval, BOBY, Southern [facility] Health CareSW    [facility]  [phone]

## 2023-01-17 NOTE — TELEPHONE ENCOUNTER
Central Prior Authorization Team   Phone: [phone]      PA Initiation    Medication: dronabinol (MARINOL) 5 MG capsule  Insurance Company: [employer] [place] - Phone [phone] Fax [phone]  Pharmacy Filling the Rx: [id number] [place] - [address]  Filling Pharmacy Phone: [phone]  Filling Pharmacy Fax:    Start Date: 1/17/2023

## 2023-01-18 NOTE — TELEPHONE ENCOUNTER
PRIOR AUTHORIZATION DENIED    Medication: dronabinol (MARINOL) 5 MG capsule    Denial Date: 1/18/2023    Denial Rational:           Appeal Information:

## 2023-06-13 NOTE — LETTER
June 13, 2023    Steve Herring  [address] [address]    Dear Steve,    At [facility] we care about your health and are committed to providing quality patient care.     Here is a list of Health Maintenance topics that are due now or due soon:  Health Maintenance Due   Topic Date Due    ANNUAL REVIEW OF HM ORDERS  08/31/2022    PHQ-2 (once per calendar year)  01/01/2023    COVID-19 Vaccine (6 - Pfizer series) 03/18/2023    MEDICARE ANNUAL WELLNESS VISIT  04/04/2023    FALL RISK ASSESSMENT  04/04/2023        We are recommending that you:  Schedule a WELLNESS (Preventative/Physical) APPOINTMENT with your primary care provider. If you go elsewhere for your wellness appointments then please disregard this reminder     and   Schedule a Nurse-Only appointment to update your immunizations: Your records indicate that you are not up to date with your immunizations, please schedule a nurse-only appointment to get these updated or update them at your next office visit. If this is incorrect, please disregard.    To schedule an appointment or discuss this further, you may contact us by phone at the [facility] at [phone] or online through the patient [email or web address] @ [email or web address]    Thank you for trusting [facility] and we appreciate the opportunity to serve you.  We look forward to supporting your healthcare needs in the future.    Your partners in health,      Quality Committee at [facility]

## 2023-06-13 NOTE — TELEPHONE ENCOUNTER
Patient Quality Outreach    Patient is due for the following:   Physical Annual Wellness Visit      Topic Date Due     COVID-19 Vaccine (6 - Pfizer series) 03/18/2023       Next Steps:   Schedule a Annual Wellness Visit    Type of outreach:    Sent letter.      Questions for provider review:    None           Mac Ramon MA